# Patient Record
Sex: FEMALE | Race: WHITE | NOT HISPANIC OR LATINO | Employment: OTHER | URBAN - METROPOLITAN AREA
[De-identification: names, ages, dates, MRNs, and addresses within clinical notes are randomized per-mention and may not be internally consistent; named-entity substitution may affect disease eponyms.]

---

## 2017-01-23 DIAGNOSIS — G89.4 CHRONIC PAIN SYNDROME: Primary | ICD-10-CM

## 2017-01-23 RX ORDER — TRAMADOL HYDROCHLORIDE 50 MG/1
TABLET ORAL
Qty: 180 TABLET | Refills: 0 | Status: SHIPPED | OUTPATIENT
Start: 2017-01-23 | End: 2017-02-23

## 2017-01-23 NOTE — TELEPHONE ENCOUNTER
Controlled Substance Refill Request for traMADol (ULTRAM) 50 MG tablet  Problem List Complete:  Yes    Chronic pain syndrome         Problem Detail      Noted:  1/27/2016      Priority:  Medium      Overview Addendum 9/8/2016  8:04 AM by Malia Julio MD     Patient is followed by Data Unavailable for ongoing prescription of pain medication.  All refills should be approved by this provider, or covering partner.    Medication(s): tramadol 50 mg .    Maximum quantity per month: 180  Clinic visit frequency required: Q 3 months     Controlled substance agreement on file: Yes       Date(s): 2010    Pain Clinic evaluation in the past: No    DIRE Total Score(s):  No flowsheet data found.    Last [facility] website verification:  none; 09/08/2016     [email or web address]        checked in past 6 months?  Yes 9/8/2016

## 2017-02-23 DIAGNOSIS — G89.4 CHRONIC PAIN SYNDROME: ICD-10-CM

## 2017-02-23 RX ORDER — TRAMADOL HYDROCHLORIDE 50 MG/1
TABLET ORAL
Qty: 180 TABLET | Refills: 0 | Status: SHIPPED | OUTPATIENT
Start: 2017-02-23 | End: 2017-03-31

## 2017-02-23 NOTE — TELEPHONE ENCOUNTER
Controlled Substance Refill Request for traMADol (ULTRAM) 50 MG tablet  Problem List Complete:  Yes  Problem Detail      Noted:  1/27/2016      Priority:  Medium      Overview Addendum 9/8/2016  8:04 AM by Malia Julio MD     Patient is followed by Data Unavailable for ongoing prescription of pain medication. All refills should be approved by this provider, or covering partner.     Medication(s): tramadol 50 mg .   Maximum quantity per month: 180  Clinic visit frequency required: Q 3 months      Controlled substance agreement on file: Yes  Date(s): 2010     Pain Clinic evaluation in the past: No     DIRE Total Score(s):  No flowsheet data found.     Last [facility] website verification: none; 09/08/2016   [email or web address]        Previous Version        checked in past 6 months?  Yes 09/08/2016  Stephanie Cat MA

## 2017-02-28 ENCOUNTER — TELEPHONE (OUTPATIENT)
Dept: FAMILY MEDICINE | Facility: CLINIC | Age: 72
End: 2017-02-28

## 2017-02-28 NOTE — TELEPHONE ENCOUNTER
amoxicillin (AMOXIL) 500 MG capsule      Last Written Prescription Date: 6/23/2015  Last Fill Quantity: 4 cap,  # refills: 4   Last Office Visit with [facility], [facility] or [facility] prescribing provider: 12/20/2016

## 2017-03-02 NOTE — TELEPHONE ENCOUNTER
Reason for call: Please let patient/pharmacy know if this is being signed/sent.    Phone Number Pt can be reached at: Other phone number:  [phone]  Best Time: anytime  Can we leave a detailed message on this number? YES

## 2017-03-03 RX ORDER — AMOXICILLIN 500 MG/1
CAPSULE ORAL
Qty: 4 CAPSULE | Refills: 0 | OUTPATIENT
Start: 2017-03-03

## 2017-03-03 NOTE — TELEPHONE ENCOUNTER
RN to call pt  Does not need antibiotics for coronary artery disease-- I do not see any evidence of condition that requires antibiotics prior to dental work.  Malia Julio MD

## 2017-03-03 NOTE — TELEPHONE ENCOUNTER
"Spoke with patient and discussed Dr. Julio's message.  Patient stated that she had \"open heart surgery and Dr. Huber has always prescribed abx for dental work.\"  She stated that she does not have a dental appointment until a couple more weeks out but would like message sent to Dr. Cecile drummond address upon her return    Routing to Dr. Cecile Wren RN    "

## 2017-03-03 NOTE — TELEPHONE ENCOUNTER
Routing refill request to provider for review/approval because:  Drug not on the [facility] refill protocol- patient takes prior to dental work   Amy White RN

## 2017-03-06 NOTE — TELEPHONE ENCOUNTER
I called patient and told her she no longer needs amoxicillin prior to dental work. She was relieved.   LEONARDO SANDHU M.D.

## 2017-03-07 RX ORDER — AMOXICILLIN 500 MG/1
CAPSULE ORAL
Qty: 4 CAPSULE | Refills: 0 | Status: CANCELLED | OUTPATIENT
Start: 2017-03-07

## 2017-03-07 NOTE — TELEPHONE ENCOUNTER
Amoxicillin 500mg      Last Written Prescription Date: 06/23/2015  Last Fill Quantity: 4 cap,  # refills: 4   Last Office Visit with [facility], [facility] or [facility] prescribing provider: 12/20/2016                                             Stephanie Cat MA

## 2017-03-09 NOTE — TELEPHONE ENCOUNTER
Patient was called. She now knows that she no longer needs to take amoxicillin prior to dental work.     LEONARDO SANDHU M.D.

## 2017-03-09 NOTE — TELEPHONE ENCOUNTER
Routing refill request to provider for review/approval because:  A break in medication: last refilled back in 2015.    Fer JIM RN, BSN

## 2017-03-28 DIAGNOSIS — G89.4 CHRONIC PAIN SYNDROME: ICD-10-CM

## 2017-03-29 NOTE — TELEPHONE ENCOUNTER
Controlled Substance Refill Request for traMADol (ULTRAM) 50 MG tablet  Problem List Complete:  Yes--    Patient is followed by Data Unavailable for ongoing prescription of pain medication.  All refills should be approved by this provider, or covering partner.    Medication(s): tramadol 50 mg .   Maximum quantity per month: 180  Clinic visit frequency required: Q 3 months     Controlled substance agreement on file: Yes       Date(s): 2010    Pain Clinic evaluation in the past: No    DIRE Total Score(s):  No flowsheet data found.    Last [facility] website verification:  none; 09/08/2016    [email or web address]   checked in past 6 months?  No, route to RUBY Ruffin CMA

## 2017-03-30 RX ORDER — TRAMADOL HYDROCHLORIDE 50 MG/1
TABLET ORAL
Start: 2017-03-30

## 2017-03-30 NOTE — TELEPHONE ENCOUNTER
Refused Prescriptions:                       Disp   Refills    traMADol (ULTRAM) 50 MG tablet [Pharmacy M*                Sig: TAKE 2 TABLETS BY MOUTH EVERY 6 HOURS AS NEEDED FOR           PAIN  Refused By: MADALYN SWAN  Reason for Refusal: Appt required, please call patient

## 2017-03-30 NOTE — TELEPHONE ENCOUNTER
RX monitoring [place] ([place]) reviewed: RNs are unable to access  at this time. Not sure if this is a website issue. Error page comes up when passwords are entered    [place] profile:  [email or web address]  Rios Wren RN

## 2017-03-31 ENCOUNTER — TELEPHONE (OUTPATIENT)
Dept: FAMILY MEDICINE | Facility: CLINIC | Age: 72
End: 2017-03-31

## 2017-03-31 DIAGNOSIS — G89.4 CHRONIC PAIN SYNDROME: Primary | ICD-10-CM

## 2017-03-31 RX ORDER — TRAMADOL HYDROCHLORIDE 50 MG/1
50 TABLET ORAL EVERY 8 HOURS PRN
Qty: 36 TABLET | Refills: 0 | Status: SHIPPED | OUTPATIENT
Start: 2017-03-31 | End: 2017-04-06

## 2017-03-31 RX ORDER — TRAMADOL HYDROCHLORIDE 50 MG/1
TABLET ORAL
Qty: 36 TABLET | Refills: 0 | Status: SHIPPED | OUTPATIENT
Start: 2017-03-31 | End: 2017-04-27 | Stop reason: DRUGHIGH

## 2017-03-31 NOTE — TELEPHONE ENCOUNTER
Patient called RN hotline wondering why this was refused.  Notified patient that she does need a visit with provider every 3 months, patient was not aware of this.  Patient scheduled f/u visit on 4/6/17 (requested PCP only).  Patient has 1 days worth of Tramdol and is wondering if she can get enough to make it through to appointment?  Please adivse- order teed up   Amy White RN
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
## 2017-04-06 ENCOUNTER — OFFICE VISIT (OUTPATIENT)
Dept: FAMILY MEDICINE | Facility: CLINIC | Age: 72
End: 2017-04-06
Payer: COMMERCIAL

## 2017-04-06 VITALS
WEIGHT: 240 LBS | OXYGEN SATURATION: 96 % | SYSTOLIC BLOOD PRESSURE: 132 MMHG | HEIGHT: 69 IN | HEART RATE: 89 BPM | TEMPERATURE: 98.6 F | DIASTOLIC BLOOD PRESSURE: 68 MMHG | BODY MASS INDEX: 35.55 KG/M2

## 2017-04-06 DIAGNOSIS — G89.4 CHRONIC PAIN SYNDROME: ICD-10-CM

## 2017-04-06 DIAGNOSIS — M25.811 SHOULDER IMPINGEMENT, RIGHT: ICD-10-CM

## 2017-04-06 DIAGNOSIS — E11.21 TYPE 2 DIABETES MELLITUS WITH DIABETIC NEPHROPATHY, WITHOUT LONG-TERM CURRENT USE OF INSULIN (H): Primary | ICD-10-CM

## 2017-04-06 DIAGNOSIS — N18.30 CKD (CHRONIC KIDNEY DISEASE) STAGE 3, GFR 30-59 ML/MIN (H): ICD-10-CM

## 2017-04-06 LAB
ANION GAP SERPL CALCULATED.3IONS-SCNC: 9 MMOL/L (ref 3–14)
BUN SERPL-MCNC: 39 MG/DL (ref 7–30)
CALCIUM SERPL-MCNC: 9.8 MG/DL (ref 8.5–10.1)
CHLORIDE SERPL-SCNC: 103 MMOL/L (ref 94–109)
CO2 SERPL-SCNC: 26 MMOL/L (ref 20–32)
CREAT SERPL-MCNC: 1.38 MG/DL (ref 0.52–1.04)
GFR SERPL CREATININE-BSD FRML MDRD: 37 ML/MIN/1.7M2
GLUCOSE SERPL-MCNC: 163 MG/DL (ref 70–99)
HBA1C MFR BLD: 8.2 % (ref 4.3–6)
POTASSIUM SERPL-SCNC: 4.7 MMOL/L (ref 3.4–5.3)
SODIUM SERPL-SCNC: 138 MMOL/L (ref 133–144)

## 2017-04-06 PROCEDURE — 83036 HEMOGLOBIN GLYCOSYLATED A1C: CPT | Performed by: FAMILY MEDICINE

## 2017-04-06 PROCEDURE — 99207 C FOOT EXAM  NO CHARGE: CPT | Performed by: FAMILY MEDICINE

## 2017-04-06 PROCEDURE — 80048 BASIC METABOLIC PNL TOTAL CA: CPT | Performed by: FAMILY MEDICINE

## 2017-04-06 PROCEDURE — 99214 OFFICE O/P EST MOD 30 MIN: CPT | Performed by: FAMILY MEDICINE

## 2017-04-06 PROCEDURE — 36415 COLL VENOUS BLD VENIPUNCTURE: CPT | Performed by: FAMILY MEDICINE

## 2017-04-06 RX ORDER — TRAMADOL HYDROCHLORIDE 50 MG/1
50 TABLET ORAL EVERY 8 HOURS PRN
Qty: 180 TABLET | Refills: 2 | Status: SHIPPED | OUTPATIENT
Start: 2017-04-06 | End: 2017-04-27 | Stop reason: DRUGHIGH

## 2017-04-06 NOTE — LETTER
[address] [place] [address]    April 6, 2017    Anamaria Parra  [address] [place] [address]          Dear Anamaria,    Your lab tests are looking stable. You still have chronic kidney disease stage 3. It is important that you try hard to get your hemoglobin A1C below 8.0 and that you try to use as little tramadol as possible as both diabetes and tramadol are hard on your kidneys. Try to drink plenty of plain water during the day, also. See me again in 3 months.    Enclosed is a copy of your results.     Results for orders placed or performed in visit on 04/06/17   HEMOGLOBIN A1C   Result Value Ref Range    Hemoglobin A1C 8.2 (H) 4.3 - 6.0 %   Basic metabolic panel   Result Value Ref Range    Sodium 138 133 - 144 mmol/L    Potassium 4.7 3.4 - 5.3 mmol/L    Chloride 103 94 - 109 mmol/L    Carbon Dioxide 26 20 - 32 mmol/L    Anion Gap 9 3 - 14 mmol/L    Glucose 163 (H) 70 - 99 mg/dL    Urea Nitrogen 39 (H) 7 - 30 mg/dL    Creatinine 1.38 (H) 0.52 - 1.04 mg/dL    GFR Estimate 37 (L) >60 mL/min/1.7m2    GFR Estimate If Black 45 (L) >60 mL/min/1.7m2    Calcium 9.8 8.5 - 10.1 mg/dL       If you have any questions or concerns, please call myself or my nurse at [phone].      Sincerely,        Cris Huber MD/KI

## 2017-04-06 NOTE — PATIENT INSTRUCTIONS
[facility]    If you have any questions regarding to your visit please contact your care team:       Team Purple:   Clinic Hours Telephone Number   [address]   7am-7pm  Monday - Thursday   7am-5pm  Fridays  [phone]  (Appointment scheduling available 24/7)    Questions about your Visit?   Team Line:  [phone]   Urgent Care - [place] and [facility] - 11am-9pm Monday-Friday Saturday-Sunday- 9am-5pm   [place] - 5pm-9pm Monday-Friday Saturday-Sunday- 9am-5pm  [phone] - [facility]  [phone] - [place]       What options do I have for visits at the clinic other than the traditional office visit?  To expand how we care for you, many of our providers are utilizing electronic visits (e-visits) and telephone visits, when medically appropriate, for interactions with their patients rather than a visit in the clinic.   We also offer nurse visits for many medical concerns. Just like any other service, we will bill your insurance company for this type of visit based on time spent on the phone with your provider. Not all insurance companies cover these visits. Please check with your medical insurance if this type of visit is covered. You will be responsible for any charges that are not paid by your insurance.      E-visits via [employer]:  generally incur a $35.00 fee.  Telephone visits:  Time spent on the phone: *charged based on time that is spent on the phone in increments of 10 minutes. Estimated cost:   5-10 mins $30.00   11-20 mins. $59.00   21-30 mins. $85.00     Use [employer] (secure email communication and access to your chart) to send your primary care provider a message or make an appointment. Ask someone on your Team how to sign up for [employer].  For a Price Quote for your services, please call our Consumer Price Line at [phone].  As always, Thank you for trusting us with your health care needs!

## 2017-04-06 NOTE — MR AVS SNAPSHOT
After Visit Summary   4/6/2017    Anamaria Parra    MRN: [id number]           Patient Information     Date Of Birth          1945        Visit Information        Provider Department      4/6/2017 10:30 AM Cris Huber MD [facility]        Today's Diagnoses     Type 2 diabetes mellitus with diabetic nephropathy, without long-term current use of insulin (H)    -  1    CKD (chronic kidney disease) stage 3, GFR 30-59 ml/min        Shoulder impingement, right        Chronic pain syndrome          Care Instructions    [facility] (formerly [facility])    If you have any questions regarding to your visit please contact your care team:       Team Purple:   Clinic Hours Telephone Number   [address] Dr[address]   7am-7pm  Monday - Thursday   7am-5pm  Fridays  [phone]  (Appointment scheduling available 24/7)    Questions about your Visit?   Team Line:  [phone]   Urgent Care - [place] - 11am-9pm Monday-Friday Saturday-Sunday- 9am-5pm   [place] - 5pm-9pm Monday-Friday Saturday-Sunday- 9am-5pm  [phone] - [place]   [phone] - [place]       What options do I have for visits at the clinic other than the traditional office visit?  To expand how we care for you, many of our providers are utilizing electronic visits (e-visits) and telephone visits, when medically appropriate, for interactions with their patients rather than a visit in the clinic.   We also offer nurse visits for many medical concerns. Just like any other service, we will bill your insurance company for this type of visit based on time spent on the phone with your provider. Not all insurance companies cover these visits. Please check with your medical insurance if this type of visit is covered. You will be responsible for any charges that are not paid by your insurance.      E-visits via Peer60:  generally incur a $35.00  fee.  Telephone visits:  Time spent on the phone: *charged based on time that is spent on the phone in increments of 10 minutes. Estimated cost:   5-10 mins $30.00   11-20 mins. $59.00   21-30 mins. $85.00     Use KonaWaret (secure email communication and access to your chart) to send your primary care provider a message or make an appointment. Ask someone on your Team how to sign up for DokDok.  For a Price Quote for your services, please call our [employer] Line at [phone].  As always, Thank you for trusting us with your health care needs!            Follow-ups after your visit        Your next 10 appointments already scheduled     Jul 06, 2017 10:00 AM CDT   Office Visit with Cris Huber MD   [facility] ([address] [id number] [phone]           Bring a current list of meds and any records pertaining to this visit.  For Physicals, please bring immunization records and any forms needing to be filled out.  Please arrive 10 minutes early to complete paperwork.              Who to contact     If you have questions or need follow up information about today's clinic visit or your schedule please contact [facility] directly at [phone].  Normal or non-critical lab and imaging results will be communicated to you by Planspothart, letter or phone within 4 business days after the clinic has received the results. If you do not hear from us within 7 days, please contact the clinic through Planspothart or phone. If you have a critical or abnormal lab result, we will notify you by phone as soon as possible.  Submit refill requests through DokDok or call your pharmacy and they will forward the refill request to us. Please allow 3 business days for your refill to be completed.          Additional Information About Your Visit        DokDok Information     DokDok lets you send messages to your doctor, view your test results, renew your  "prescriptions, schedule appointments and more. To sign up, go to [email or web address] . Click on \"Log in\" on the left side of the screen, which will take you to the Welcome page. Then click on \"Sign up Now\" on the right side of the page.     You will be asked to enter the access code listed below, as well as some personal information. Please follow the directions to create your username and password.     Your access code is: [email or web address]  Expires: 2017 10:47 AM     Your access code will  in 90 days. If you need help or a new code, please call your [place] clinic or [phone].        Care EveryWhere ID     This is your Care EveryWhere ID. This could be used by other organizations to access your [place] medical records  [id number]        Your Vitals Were     Pulse Temperature Height Pulse Oximetry BMI (Body Mass Index)       89 98.6  F (37  C) 5' 9\" (1.753 m) 96% 35.44 kg/m2        Blood Pressure from Last 3 Encounters:   17 132/68   16 128/74   16 112/60    Weight from Last 3 Encounters:   17 240 lb (108.9 kg)   16 235 lb (106.6 kg)   16 241 lb (109.3 kg)              We Performed the Following     Basic metabolic panel     C FOOT EXAM  NO CHARGE     HEMOGLOBIN A1C          Where to get your medicines      Some of these will need a paper prescription and others can be bought over the counter.  Ask your nurse if you have questions.     Bring a paper prescription for each of these medications     traMADol 50 MG tablet          Primary Care Provider Office Phone # Fax #    Cris Huber [phone]       [address] [address]        Thank you!     Thank you for choosing [facility]  for your care. Our goal is always to provide you with excellent care. Hearing back from our patients is one way we can continue to improve our services. Please take a few minutes to complete the " written survey that you may receive in the mail after your visit with us. Thank you!             Your Updated Medication List - Protect others around you: Learn how to safely use, store and throw away your medicines at www.disposemymeds.org.          This list is accurate as of: 4/6/17 10:47 AM.  Always use your most recent med list.                   Brand Name Dispense Instructions for use    aspirin 325 MG EC tablet     100 Tab    take 1 Tab by mouth daily.       B-12 PO      1000 MCG DAILY       fish oil-omega-3 fatty acids 1000 MG capsule      2 TABLETS DAILY       FOLIC ACID PO      3000 MCG DAILY       furosemide 40 MG tablet    LASIX    90 tablet    Take 1 tablet (40 mg) by mouth daily       glipiZIDE 2.5 MG 24 hr tablet    GLUCOTROL XL    90 tablet    Take 1 tablet (2.5 mg) by mouth daily (with breakfast)       lisinopril 20 MG tablet    PRINIVIL/ZESTRIL    90 tablet    Take 1 tablet (20 mg) by mouth daily       metFORMIN 500 MG tablet    GLUCOPHAGE    360 tablet    Take 2 tablets (1,000 mg) by mouth 2 times daily (with meals)       metoprolol 50 MG 24 hr tablet    TOPROL-XL    135 tablet    Take 1.5 tablets (75 mg) by mouth daily       Multi-vitamin Tabs tablet   Generic drug:  multivitamin, therapeutic with minerals      1 TABLET DAILY       nitroglycerin 0.4 MG sublingual tablet    NITROSTAT    60 tablet    Place 1 tablet (0.4 mg) under the tongue every 5 minutes as needed up to 3 tablets per episode.       POMEGRANATE EXTRACT PO      Take by mouth daily Reported on 4/6/2017       rosuvastatin 40 MG tablet    CRESTOR    90 tablet    Take 1 tablet (40 mg) by mouth daily Replaces Lipitor because her insurance approved Crestor for another year       STOOL SOFTENER PO      None Entered       * traMADol 50 MG tablet    ULTRAM    36 tablet    TAKE 2 TABLETS BY MOUTH EVERY 6 HOURS AS NEEDED FOR MODERATE PAIN       * traMADol 50 MG tablet    ULTRAM    180 tablet    Take 1 tablet (50 mg) by mouth every 8 hours as  needed for moderate pain (Max)       vitamin D 1000 UNITS capsule      1 CAPSULE DAILY       * Notice:  This list has 2 medication(s) that are the same as other medications prescribed for you. Read the directions carefully, and ask your doctor or other care provider to review them with you.

## 2017-04-06 NOTE — NURSING NOTE
"Chief Complaint   Patient presents with     Recheck Medication       Initial /68 (BP Location: Left arm, Patient Position: Chair, Cuff Size: Adult Large)  Pulse 89  Temp 98.6  F (37  C)  Ht 5' 9\" (1.753 m)  Wt 240 lb (108.9 kg)  SpO2 96%  BMI 35.44 kg/m2 Estimated body mass index is 35.44 kg/(m^2) as calculated from the following:    Height as of this encounter: 5' 9\" (1.753 m).    Weight as of this encounter: 240 lb (108.9 kg).  Medication Reconciliation: complete   Silvia Adam MA      "
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
## 2017-04-27 ENCOUNTER — OFFICE VISIT (OUTPATIENT)
Dept: FAMILY MEDICINE | Facility: CLINIC | Age: 72
End: 2017-04-27
Payer: COMMERCIAL

## 2017-04-27 VITALS
OXYGEN SATURATION: 98 % | DIASTOLIC BLOOD PRESSURE: 68 MMHG | SYSTOLIC BLOOD PRESSURE: 132 MMHG | TEMPERATURE: 98 F | BODY MASS INDEX: 35.7 KG/M2 | HEART RATE: 84 BPM | WEIGHT: 241 LBS | HEIGHT: 69 IN

## 2017-04-27 DIAGNOSIS — L23.9 ALLERGIC CONTACT DERMATITIS, UNSPECIFIED TRIGGER: Primary | ICD-10-CM

## 2017-04-27 PROCEDURE — 99213 OFFICE O/P EST LOW 20 MIN: CPT | Performed by: FAMILY MEDICINE

## 2017-04-27 RX ORDER — MOMETASONE FUROATE 1 MG/G
CREAM TOPICAL
Qty: 45 G | Refills: 1 | Status: SHIPPED | OUTPATIENT
Start: 2017-04-27 | End: 2019-07-02

## 2017-04-27 NOTE — PATIENT INSTRUCTIONS
[facility]    If you have any questions regarding to your visit please contact your care team:       Team Purple:   Clinic Hours Telephone Number   [address] Dr.   7am-7pm  Monday - Thursday   7am-5pm  Fridays  [phone]  (Appointment scheduling available 24/7)    Questions about your Visit?   Team Line:  [phone]   Urgent Care - [place] and [facility] - 11am-9pm Monday-Friday Saturday-Sunday- 9am-5pm   [place] - 5pm-9pm Monday-Friday Saturday-Sunday- 9am-5pm  [phone] - [facility]  [phone] - [place]       What options do I have for visits at the clinic other than the traditional office visit?  To expand how we care for you, many of our providers are utilizing electronic visits (e-visits) and telephone visits, when medically appropriate, for interactions with their patients rather than a visit in the clinic.   We also offer nurse visits for many medical concerns. Just like any other service, we will bill your insurance company for this type of visit based on time spent on the phone with your provider. Not all insurance companies cover these visits. Please check with your medical insurance if this type of visit is covered. You will be responsible for any charges that are not paid by your insurance.      E-visits via 5minutes:  generally incur a $35.00 fee.  Telephone visits:  Time spent on the phone: *charged based on time that is spent on the phone in increments of 10 minutes. Estimated cost:   5-10 mins $30.00   11-20 mins. $59.00   21-30 mins. $85.00     Use Revel Toucht (secure email communication and access to your chart) to send your primary care provider a message or make an appointment. Ask someone on your Team how to sign up for 5minutes.  For a Price Quote for your services, please call our Consumer Price Line at [phone].  As always, Thank you for trusting us with your health care needs!

## 2017-04-27 NOTE — MR AVS SNAPSHOT
After Visit Summary   4/27/2017    Anamaria Parra    MRN: [id number]           Patient Information     Date Of Birth          1945        Visit Information        Provider Department      4/27/2017 10:30 AM Cris Huber MD [facility]        Today's Diagnoses     Allergic contact dermatitis, unspecified trigger    -  1      Care Instructions    [facility]    If you have any questions regarding to your visit please contact your care team:       Team Purple:   Clinic Hours Telephone Number   [address] Dr.   7am-7pm  Monday - Thursday   7am-5pm  Fridays  [phone]  (Appointment scheduling available 24/7)    Questions about your Visit?   Team Line:  [phone]   [facility] - [place] [facility] - 11am-9pm Monday-Friday Saturday-Sunday- 9am-5pm   [place] - 5pm-9pm Monday-Friday Saturday-Sunday- 9am-5pm  [phone] - [facility]  [phone] - [place]       What options do I have for visits at the clinic other than the traditional office visit?  To expand how we care for you, many of our providers are utilizing electronic visits (e-visits) and telephone visits, when medically appropriate, for interactions with their patients rather than a visit in the clinic.   We also offer nurse visits for many medical concerns. Just like any other service, we will bill your insurance company for this type of visit based on time spent on the phone with your provider. Not all insurance companies cover these visits. Please check with your medical insurance if this type of visit is covered. You will be responsible for any charges that are not paid by your insurance.      E-visits via 28msec:  generally incur a $35.00 fee.  Telephone visits:  Time spent on the phone: *charged based on time that is spent on the phone in increments of 10 minutes. Estimated cost:   5-10 mins $30.00   11-20 mins.  "$59.00   21-30 mins. $85.00     Use Pixtronixhart (secure email communication and access to your chart) to send your primary care provider a message or make an appointment. Ask someone on your Team how to sign up for [employer].  For a Price Quote for your services, please call our Consumer Price Line at [phone].  As always, Thank you for trusting us with your health care needs!            Follow-ups after your visit        Your next 10 appointments already scheduled     Jul 06, 2017 10:00 AM CDT   Office Visit with Cris Huber MD   [facility] ([facility])    [address] [place] [id number] [phone]           Bring a current list of meds and any records pertaining to this visit.  For Physicals, please bring immunization records and any forms needing to be filled out.  Please arrive 10 minutes early to complete paperwork.              Who to contact     If you have questions or need follow up information about today's clinic visit or your schedule please contact [facility] directly at [phone].  Normal or non-critical lab and imaging results will be communicated to you by Pixtronixhart, letter or phone within 4 business days after the clinic has received the results. If you do not hear from us within 7 days, please contact the clinic through Fanaticst or phone. If you have a critical or abnormal lab result, we will notify you by phone as soon as possible.  Submit refill requests through [employer] or call your pharmacy and they will forward the refill request to us. Please allow 3 business days for your refill to be completed.          Additional Information About Your Visit        [employer] Products Information     [employer] lets you send messages to your doctor, view your test results, renew your prescriptions, schedule appointments and more. To sign up, go to [email or web address] . Click on \"Log in\" on the left side of the screen, which will take you to the Welcome " "page. Then click on \"Sign up Now\" on the right side of the page.     You will be asked to enter the access code listed below, as well as some personal information. Please follow the directions to create your username and password.     Your access code is: [email or web address]  Expires: 2017 10:47 AM     Your access code will  in 90 days. If you need help or a new code, please call your [place] clinic or [phone].        Care EveryWhere ID     This is your Care EveryWhere ID. This could be used by other organizations to access your [place] medical records  [id number]        Your Vitals Were     Pulse Temperature Height Pulse Oximetry BMI (Body Mass Index)       84 98  F (36.7  C) 5' 9\" (1.753 m) 98% 35.59 kg/m2        Blood Pressure from Last 3 Encounters:   17 132/68   17 132/68   16 128/74    Weight from Last 3 Encounters:   17 241 lb (109.3 kg)   17 240 lb (108.9 kg)   16 235 lb (106.6 kg)              Today, you had the following     No orders found for display         Today's Medication Changes          These changes are accurate as of: 17 10:49 AM.  If you have any questions, ask your nurse or doctor.               Start taking these medicines.        Dose/Directions    mometasone 0.1 % cream   Commonly known as:  ELOCON   Used for:  Allergic contact dermatitis, unspecified trigger   Started by:  Cris Huber MD        Apply sparingly to affected area twice daily as needed.  Do not apply to face.   Quantity:  45 g   Refills:  1         Stop taking these medicines if you haven't already. Please contact your care team if you have questions.     traMADol 50 MG tablet   Commonly known as:  ULTRAM   Stopped by:  Cris Huber MD                Where to get your medicines      [email or web address]  [place], [place] [facility] [address] [facility] [address] [address]  [address], [place] [address]     " Phone:  [phone]     mometasone 0.1 % cream                Primary Care Provider Office Phone # Fax #    Cris Huber [phone]       [address]  [facility] [address]        Thank you!     Thank you for choosing [facility]  for your care. Our goal is always to provide you with excellent care. Hearing back from our patients is one way we can continue to improve our services. Please take a few minutes to complete the written survey that you may receive in the mail after your visit with us. Thank you!             Your Updated Medication List - Protect others around you: Learn how to safely use, store and throw away your medicines at www.disposemymeds.org.          This list is accurate as of: 4/27/17 10:49 AM.  Always use your most recent med list.                   Brand Name Dispense Instructions for use    aspirin 325 MG EC tablet     100 Tab    take 1 Tab by mouth daily.       B-12 PO      1000 MCG DAILY       fish oil-omega-3 fatty acids 1000 MG capsule      2 TABLETS DAILY       FOLIC ACID PO      3000 MCG DAILY       furosemide 40 MG tablet    LASIX    90 tablet    Take 1 tablet (40 mg) by mouth daily       glipiZIDE 2.5 MG 24 hr tablet    GLUCOTROL XL    90 tablet    Take 1 tablet (2.5 mg) by mouth daily (with breakfast)       lisinopril 20 MG tablet    PRINIVIL/ZESTRIL    90 tablet    Take 1 tablet (20 mg) by mouth daily       metFORMIN 500 MG tablet    GLUCOPHAGE    360 tablet    Take 2 tablets (1,000 mg) by mouth 2 times daily (with meals)       metoprolol 50 MG 24 hr tablet    TOPROL-XL    135 tablet    Take 1.5 tablets (75 mg) by mouth daily       mometasone 0.1 % cream    ELOCON    45 g    Apply sparingly to affected area twice daily as needed.  Do not apply to face.       Multi-vitamin Tabs tablet   Generic drug:  multivitamin, therapeutic with minerals      1 TABLET DAILY       nitroglycerin 0.4 MG sublingual tablet     NITROSTAT    60 tablet    Place 1 tablet (0.4 mg) under the tongue every 5 minutes as needed up to 3 tablets per episode.       POMEGRANATE EXTRACT PO      Take by mouth daily Reported on 4/6/2017       rosuvastatin 40 MG tablet    CRESTOR    90 tablet    Take 1 tablet (40 mg) by mouth daily Replaces Lipitor because her insurance approved Crestor for another year       STOOL SOFTENER PO      None Entered       vitamin D 1000 UNITS capsule      1 CAPSULE DAILY

## 2017-04-27 NOTE — NURSING NOTE
"Chief Complaint   Patient presents with     Ear Drainage     Recurring Drainged Behind (L) Ear     Health Maintenance     Colon cancer screening due       Initial /68 (BP Location: Right arm, Patient Position: Chair, Cuff Size: Adult Large)  Pulse 84  Temp 98  F (36.7  C)  Ht 5' 9\" (1.753 m)  Wt 241 lb (109.3 kg)  SpO2 98%  BMI 35.59 kg/m2 Estimated body mass index is 35.59 kg/(m^2) as calculated from the following:    Height as of this encounter: 5' 9\" (1.753 m).    Weight as of this encounter: 241 lb (109.3 kg).  Medication Reconciliation: complete   Silvia Adam MA      "

## 2017-05-08 ENCOUNTER — TELEPHONE (OUTPATIENT)
Dept: FAMILY MEDICINE | Facility: CLINIC | Age: 72
End: 2017-05-08

## 2017-05-08 DIAGNOSIS — G89.4 CHRONIC PAIN SYNDROME: Primary | ICD-10-CM

## 2017-05-08 DIAGNOSIS — M19.019 AC (ACROMIOCLAVICULAR) JOINT ARTHRITIS: ICD-10-CM

## 2017-05-08 RX ORDER — TRAMADOL HYDROCHLORIDE 50 MG/1
TABLET ORAL
Qty: 180 TABLET | Refills: 2 | Status: SHIPPED | OUTPATIENT
Start: 2017-05-08 | End: 2017-08-03

## 2017-05-08 NOTE — TELEPHONE ENCOUNTER
Controlled Substance Refill Request for traMADol (ULTRAM) 50 MG tablet   Problem List Complete:  Yes  Chronic pain syndrome         Problem Detail      Noted:  1/27/2016      Priority:  Medium      Overview Addendum 3/31/2017  1:48 PM by Sedrick Wang MD     Patient is followed by Data Unavailable for ongoing prescription of pain medication. All refills should be approved by this provider, or covering partner.     Medication(s): tramadol 50 mg .   Maximum quantity per month: 180  Clinic visit frequency required: Q 3 months      Controlled substance agreement on file: Yes  Date(s): 2010     Pain Clinic evaluation in the past: No     DIRE Total Score(s):  No flowsheet data found.     Last [facility] website verification: none; 09/08/2016   [email or web address]        Previous Version            checked in past 6 months?  Yes 03/29/2016  Stephanie Cat MA
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
## 2017-08-03 ENCOUNTER — OFFICE VISIT (OUTPATIENT)
Dept: FAMILY MEDICINE | Facility: CLINIC | Age: 72
End: 2017-08-03
Payer: COMMERCIAL

## 2017-08-03 VITALS
WEIGHT: 238.2 LBS | SYSTOLIC BLOOD PRESSURE: 128 MMHG | HEART RATE: 78 BPM | OXYGEN SATURATION: 97 % | TEMPERATURE: 98.2 F | BODY MASS INDEX: 35.28 KG/M2 | DIASTOLIC BLOOD PRESSURE: 68 MMHG | HEIGHT: 69 IN

## 2017-08-03 DIAGNOSIS — L30.9 ECZEMA, UNSPECIFIED TYPE: ICD-10-CM

## 2017-08-03 DIAGNOSIS — M19.019 AC (ACROMIOCLAVICULAR) JOINT ARTHRITIS: ICD-10-CM

## 2017-08-03 DIAGNOSIS — G89.4 CHRONIC PAIN SYNDROME: ICD-10-CM

## 2017-08-03 DIAGNOSIS — I10 BENIGN ESSENTIAL HYPERTENSION: ICD-10-CM

## 2017-08-03 DIAGNOSIS — N18.30 CKD (CHRONIC KIDNEY DISEASE) STAGE 3, GFR 30-59 ML/MIN (H): ICD-10-CM

## 2017-08-03 DIAGNOSIS — E11.21 TYPE 2 DIABETES MELLITUS WITH DIABETIC NEPHROPATHY, WITHOUT LONG-TERM CURRENT USE OF INSULIN (H): Primary | ICD-10-CM

## 2017-08-03 DIAGNOSIS — E78.5 HYPERLIPIDEMIA LDL GOAL <100: ICD-10-CM

## 2017-08-03 DIAGNOSIS — I25.810 CORONARY ARTERY DISEASE INVOLVING AUTOLOGOUS ARTERY CORONARY BYPASS GRAFT WITHOUT ANGINA PECTORIS: ICD-10-CM

## 2017-08-03 LAB
ALBUMIN SERPL-MCNC: 3.9 G/DL (ref 3.4–5)
ALP SERPL-CCNC: 68 U/L (ref 40–150)
ALT SERPL W P-5'-P-CCNC: 32 U/L (ref 0–50)
ANION GAP SERPL CALCULATED.3IONS-SCNC: 11 MMOL/L (ref 3–14)
AST SERPL W P-5'-P-CCNC: 16 U/L (ref 0–45)
BASOPHILS # BLD AUTO: 0.1 10E9/L (ref 0–0.2)
BASOPHILS NFR BLD AUTO: 0.7 %
BILIRUB SERPL-MCNC: 0.6 MG/DL (ref 0.2–1.3)
BUN SERPL-MCNC: 25 MG/DL (ref 7–30)
CALCIUM SERPL-MCNC: 9.7 MG/DL (ref 8.5–10.1)
CHLORIDE SERPL-SCNC: 102 MMOL/L (ref 94–109)
CHOLEST SERPL-MCNC: 121 MG/DL
CO2 SERPL-SCNC: 28 MMOL/L (ref 20–32)
CREAT SERPL-MCNC: 1.39 MG/DL (ref 0.52–1.04)
DIFFERENTIAL METHOD BLD: NORMAL
EOSINOPHIL # BLD AUTO: 0.3 10E9/L (ref 0–0.7)
EOSINOPHIL NFR BLD AUTO: 2.8 %
ERYTHROCYTE [DISTWIDTH] IN BLOOD BY AUTOMATED COUNT: 13.5 % (ref 10–15)
GFR SERPL CREATININE-BSD FRML MDRD: 37 ML/MIN/1.7M2
GLUCOSE SERPL-MCNC: 160 MG/DL (ref 70–99)
HBA1C MFR BLD: 7.9 % (ref 4.3–6)
HCT VFR BLD AUTO: 43.6 % (ref 35–47)
HDLC SERPL-MCNC: 46 MG/DL
HGB BLD-MCNC: 14.1 G/DL (ref 11.7–15.7)
LDLC SERPL CALC-MCNC: 43 MG/DL
LYMPHOCYTES # BLD AUTO: 1.9 10E9/L (ref 0.8–5.3)
LYMPHOCYTES NFR BLD AUTO: 20.3 %
MCH RBC QN AUTO: 30.5 PG (ref 26.5–33)
MCHC RBC AUTO-ENTMCNC: 32.3 G/DL (ref 31.5–36.5)
MCV RBC AUTO: 94 FL (ref 78–100)
MONOCYTES # BLD AUTO: 0.9 10E9/L (ref 0–1.3)
MONOCYTES NFR BLD AUTO: 9.2 %
NEUTROPHILS # BLD AUTO: 6.3 10E9/L (ref 1.6–8.3)
NEUTROPHILS NFR BLD AUTO: 67 %
NONHDLC SERPL-MCNC: 75 MG/DL
PLATELET # BLD AUTO: 221 10E9/L (ref 150–450)
POTASSIUM SERPL-SCNC: 4.5 MMOL/L (ref 3.4–5.3)
PROT SERPL-MCNC: 7.4 G/DL (ref 6.8–8.8)
RBC # BLD AUTO: 4.63 10E12/L (ref 3.8–5.2)
SODIUM SERPL-SCNC: 141 MMOL/L (ref 133–144)
TRIGL SERPL-MCNC: 159 MG/DL
TSH SERPL DL<=0.005 MIU/L-ACNC: 0.62 MU/L (ref 0.4–4)
WBC # BLD AUTO: 9.5 10E9/L (ref 4–11)

## 2017-08-03 PROCEDURE — 36415 COLL VENOUS BLD VENIPUNCTURE: CPT | Performed by: FAMILY MEDICINE

## 2017-08-03 PROCEDURE — 99214 OFFICE O/P EST MOD 30 MIN: CPT | Performed by: FAMILY MEDICINE

## 2017-08-03 PROCEDURE — 80061 LIPID PANEL: CPT | Performed by: FAMILY MEDICINE

## 2017-08-03 PROCEDURE — 80050 GENERAL HEALTH PANEL: CPT | Performed by: FAMILY MEDICINE

## 2017-08-03 PROCEDURE — 83036 HEMOGLOBIN GLYCOSYLATED A1C: CPT | Performed by: FAMILY MEDICINE

## 2017-08-03 PROCEDURE — 99207 C PAF COMPLETED  NO CHARGE: CPT | Performed by: FAMILY MEDICINE

## 2017-08-03 RX ORDER — NITROGLYCERIN 0.4 MG/1
0.4 TABLET SUBLINGUAL EVERY 5 MIN PRN
Qty: 60 TABLET | Refills: 12 | Status: SHIPPED | OUTPATIENT
Start: 2017-08-03 | End: 2018-05-14

## 2017-08-03 RX ORDER — METOPROLOL SUCCINATE 50 MG/1
75 TABLET, EXTENDED RELEASE ORAL DAILY
Qty: 135 TABLET | Refills: 5 | Status: SHIPPED | OUTPATIENT
Start: 2017-08-03 | End: 2018-05-14

## 2017-08-03 RX ORDER — ROSUVASTATIN CALCIUM 40 MG/1
40 TABLET, COATED ORAL DAILY
Qty: 90 TABLET | Refills: 3 | Status: SHIPPED | OUTPATIENT
Start: 2017-08-03 | End: 2018-05-14

## 2017-08-03 RX ORDER — LISINOPRIL 20 MG/1
20 TABLET ORAL DAILY
Qty: 90 TABLET | Refills: 4 | Status: SHIPPED | OUTPATIENT
Start: 2017-08-03 | End: 2018-05-14

## 2017-08-03 RX ORDER — TRAMADOL HYDROCHLORIDE 50 MG/1
TABLET ORAL
Qty: 180 TABLET | Refills: 2 | Status: SHIPPED | OUTPATIENT
Start: 2017-08-03 | End: 2017-11-03

## 2017-08-03 RX ORDER — FUROSEMIDE 40 MG
40 TABLET ORAL DAILY
Qty: 90 TABLET | Refills: 4 | Status: SHIPPED | OUTPATIENT
Start: 2017-08-03 | End: 2018-05-14

## 2017-08-03 RX ORDER — GLIPIZIDE 2.5 MG/1
2.5 TABLET, EXTENDED RELEASE ORAL
Qty: 90 TABLET | Refills: 4 | Status: SHIPPED | OUTPATIENT
Start: 2017-08-03 | End: 2018-05-14

## 2017-08-03 NOTE — MR AVS SNAPSHOT
After Visit Summary   8/3/2017    Anamaria Parra    MRN: [id number]           Patient Information     Date Of Birth          1945        Visit Information        Provider Department      8/3/2017 10:30 AM Cris Huber MD [facility]        Today's Diagnoses     Type 2 diabetes mellitus with diabetic nephropathy, without long-term current use of insulin (H)    -  1    CKD (chronic kidney disease) stage 3, GFR 30-59 ml/min        Coronary artery disease involving autologous artery coronary bypass graft without angina pectoris        Benign essential hypertension        Chronic pain syndrome        Eczema, unspecified type        AC (acromioclavicular) joint arthritis - right        Hyperlipidemia LDL goal <100          Care Instructions    [facility]    If you have any questions regarding to your visit please contact your care team:       Team Purple:   Clinic Hours Telephone Number   Dr. Cris Julio     7am-7pm  Monday - Thursday   7am-5pm  Fridays  [phone]  (Appointment scheduling available 24/7)    Questions about your Visit?   Team Line:  [phone]   Urgent [facility] - [place] [facility] - 11am-9pm Monday-Friday Saturday-Sunday- 9am-5pm   [place] - 5pm-9pm Monday-Friday Saturday-Sunday- 9am-5pm  [phone] - [place]   [phone] - [place]       What options do I have for visits at the clinic other than the traditional office visit?  To expand how we care for you, many of our providers are utilizing electronic visits (e-visits) and telephone visits, when medically appropriate, for interactions with their patients rather than a visit in the clinic.   We also offer nurse visits for many medical concerns. Just like any other service, we will bill your insurance company for this type of visit based on time spent on the phone with your provider. Not all insurance companies cover these  visits. Please check with your medical insurance if this type of visit is covered. You will be responsible for any charges that are not paid by your insurance.      E-visits via 1EQhart:  generally incur a $35.00 fee.  Telephone visits:  Time spent on the phone: *charged based on time that is spent on the phone in increments of 10 minutes. Estimated cost:   5-10 mins $30.00   11-20 mins. $59.00   21-30 mins. $85.00     Use Blacksumac (secure email communication and access to your chart) to send your primary care provider a message or make an appointment. Ask someone on your Team how to sign up for Black[employer]ac.  For a Price Quote for your services, please call our [employer] Line at [phone].  As always, Thank you for trusting us with your health care needs!              Follow-ups after your visit        Your next 10 appointments already scheduled     Nov 03, 2017  9:30 AM CDT   Office Visit with Cris Huber MD   [facility] ([address] [phone]           Bring a current list of meds and any records pertaining to this visit. For Physicals, please bring immunization records and any forms needing to be filled out. Please arrive 10 minutes early to complete paperwork.              Who to contact     If you have questions or need follow up information about today's clinic visit or your schedule please contact [facility] directly at [phone].  Normal or non-critical lab and imaging results will be communicated to you by 1EQhart, letter or phone within 4 business days after the clinic has received the results. If you do not hear from us within 7 days, please contact the clinic through 1EQhart or phone. If you have a critical or abnormal lab result, we will notify you by phone as soon as possible.  Submit refill requests through Blacksumac or call your pharmacy and they will forward the refill request to us. Please  "allow 3 business days for your refill to be completed.          Additional Information About Your Visit        MyChart Information     Datacratichart lets you send messages to your doctor, view your test results, renew your prescriptions, schedule appointments and more. To sign up, go to [email or web address] . Click on \"Log in\" on the left side of the screen, which will take you to the Welcome page. Then click on \"Sign up Now\" on the right side of the page.     You will be asked to enter the access code listed below, as well as some personal information. Please follow the directions to create your username and password.     Your access code is: [email or web address]  Expires: 2017 10:41 AM     Your access code will  in 90 days. If you need help or a new code, please call your [place] clinic or [phone].        Care EveryWhere ID     This is your Care EveryWhere ID. This could be used by other organizations to access your [place] medical records  [id number]        Your Vitals Were     Pulse Temperature Height Pulse Oximetry BMI (Body Mass Index)       78 98.2  F (36.8  C) 5' 9\" (1.753 m) 97% 35.18 kg/m2        Blood Pressure from Last 3 Encounters:   17 128/68   17 132/68   17 132/68    Weight from Last 3 Encounters:   17 238 lb 3.2 oz (108 kg)   17 241 lb (109.3 kg)   17 240 lb (108.9 kg)              We Performed the Following     CBC with platelets differential     Comprehensive metabolic panel     HEMOGLOBIN A1C     Lipid panel reflex to direct LDL     PAF COMPLETED     TSH with free T4 reflex          Where to get your medicines      These medications were sent to [id number]  [place] [address] [facility] [address] [address]     Phone:  [phone]     furosemide 40 MG tablet    glipiZIDE 2.5 MG 24 hr tablet    lisinopril 20 MG tablet    metFORMIN 500 MG tablet    metoprolol 50 MG 24 hr tablet "    nitroGLYcerin 0.4 MG sublingual tablet    rosuvastatin 40 MG tablet         Some of these will need a paper prescription and others can be bought over the counter.  Ask your nurse if you have questions.     Bring a paper prescription for each of these medications     traMADol 50 MG tablet          Primary Care Provider Office Phone # Fax #    Cris Huber [phone]       [address] [address]        Equal Access to Services     EMMANUELLE WALKER : Hadii aad ku hadasho Soomaali, waaxda luqadaha, qaybta kaalmada adeegyada, waxay idiin hayaan adeeg kharayue laulices . So [facility] [phone].    ATENCIÓN: Si habla español, tiene a keen disposición servicios gratuitos de asistencia lingüística. Llame al [phone].    We comply with applicable federal civil rights laws and [place] laws. We do not discriminate on the basis of race, color, national origin, age, disability sex, sexual orientation or gender identity.            Thank you!     Thank you for choosing [facility]  for your care. Our goal is always to provide you with excellent care. Hearing back from our patients is one way we can continue to improve our services. Please take a few minutes to complete the written survey that you may receive in the mail after your visit with us. Thank you!             Your Updated Medication List - Protect others around you: Learn how to safely use, store and throw away your medicines at www.disposemymeds.org.          This list is accurate as of: 8/3/17 10:41 AM.  Always use your most recent med list.                   Brand Name Dispense Instructions for use Diagnosis    aspirin 325 MG EC tablet     100 Tab    take 1 Tab by mouth daily.    Diabetes mellitus, type 2 (H)       B-12 PO      1000 MCG DAILY        fish oil-omega-3 fatty acids 1000 MG capsule      2 TABLETS DAILY        FOLIC ACID PO      3000 MCG DAILY        furosemide 40 MG  tablet    LASIX    90 tablet    Take 1 tablet (40 mg) by mouth daily    CKD (chronic kidney disease) stage 3, GFR 30-59 ml/min       glipiZIDE 2.5 MG 24 hr tablet    GLUCOTROL XL    90 tablet    Take 1 tablet (2.5 mg) by mouth daily (with breakfast)    Type 2 diabetes mellitus with diabetic nephropathy, without long-term current use of insulin (H)       lisinopril 20 MG tablet    PRINIVIL/ZESTRIL    90 tablet    Take 1 tablet (20 mg) by mouth daily    Benign essential hypertension       metFORMIN 500 MG tablet    GLUCOPHAGE    360 tablet    Take 2 tablets (1,000 mg) by mouth 2 times daily (with meals)    Type 2 diabetes mellitus with diabetic nephropathy, without long-term current use of insulin (H)       metoprolol 50 MG 24 hr tablet    TOPROL-XL    135 tablet    Take 1.5 tablets (75 mg) by mouth daily    Benign essential hypertension       mometasone 0.1 % cream    ELOCON    45 g    Apply sparingly to affected area twice daily as needed.  Do not apply to face.    Allergic contact dermatitis, unspecified trigger       Multi-vitamin Tabs tablet   Generic drug:  multivitamin, therapeutic with minerals      1 TABLET DAILY        nitroGLYcerin 0.4 MG sublingual tablet    NITROSTAT    60 tablet    Place 1 tablet (0.4 mg) under the tongue every 5 minutes as needed up to 3 tablets per episode.    Coronary artery disease involving autologous artery coronary bypass graft without angina pectoris       POMEGRANATE EXTRACT PO      Take by mouth daily Reported on 4/6/2017        rosuvastatin 40 MG tablet    CRESTOR    90 tablet    Take 1 tablet (40 mg) by mouth daily Replaces Lipitor because her insurance approved Crestor for another year    Hyperlipidemia LDL goal <100       STOOL SOFTENER PO      None Entered        traMADol 50 MG tablet    ULTRAM    180 tablet    Take 2 every 8 hours, maximum 6 tablet(s) per day    Chronic pain syndrome, AC (acromioclavicular) joint arthritis       vitamin D 1000 UNITS capsule      1 CAPSULE  DAILY

## 2017-08-03 NOTE — LETTER
[address] [place] [address]    August 4, 2017    Anamaria Parra  [address] [place] [address]          Dear Anamaria,    All of your lab tests are looking stable and good for you. Continue your healthy lifestyle. See me again in November    Enclosed is a copy of your results.     Results for orders placed or performed in visit on 08/03/17   HEMOGLOBIN A1C   Result Value Ref Range    Hemoglobin A1C 7.9 (H) 4.3 - 6.0 %   Comprehensive metabolic panel   Result Value Ref Range    Sodium 141 133 - 144 mmol/L    Potassium 4.5 3.4 - 5.3 mmol/L    Chloride 102 94 - 109 mmol/L    Carbon Dioxide 28 20 - 32 mmol/L    Anion Gap 11 3 - 14 mmol/L    Glucose 160 (H) 70 - 99 mg/dL    Urea Nitrogen 25 7 - 30 mg/dL    Creatinine 1.39 (H) 0.52 - 1.04 mg/dL    GFR Estimate 37 (L) >60 mL/min/1.7m2    GFR Estimate If Black 45 (L) >60 mL/min/1.7m2    Calcium 9.7 8.5 - 10.1 mg/dL    Bilirubin Total 0.6 0.2 - 1.3 mg/dL    Albumin 3.9 3.4 - 5.0 g/dL    Protein Total 7.4 6.8 - 8.8 g/dL    Alkaline Phosphatase 68 40 - 150 U/L    ALT 32 0 - 50 U/L    AST 16 0 - 45 U/L   Lipid panel reflex to direct LDL   Result Value Ref Range    Cholesterol 121 <200 mg/dL    Triglycerides 159 (H) <150 mg/dL    HDL Cholesterol 46 (L) >49 mg/dL    LDL Cholesterol Calculated 43 <100 mg/dL    Non HDL Cholesterol 75 <130 mg/dL   CBC with platelets differential   Result Value Ref Range    WBC 9.5 4.0 - 11.0 10e9/L    RBC Count 4.63 3.8 - 5.2 10e12/L    Hemoglobin 14.1 11.7 - 15.7 g/dL    Hematocrit 43.6 35.0 - 47.0 %    MCV 94 78 - 100 fl    MCH 30.5 26.5 - 33.0 pg    MCHC 32.3 31.5 - 36.5 g/dL    RDW 13.5 10.0 - 15.0 %    Platelet Count 221 150 - 450 10e9/L    Diff Method Automated Method     % Neutrophils 67.0 %    % Lymphocytes 20.3 %    % Monocytes 9.2 %    % Eosinophils 2.8 %    % Basophils 0.7 %    Absolute Neutrophil 6.3 1.6 - 8.3 10e9/L     Absolute Lymphocytes 1.9 0.8 - 5.3 10e9/L    Absolute Monocytes 0.9 0.0 - 1.3 10e9/L    Absolute Eosinophils 0.3 0.0 - 0.7 10e9/L    Absolute Basophils 0.1 0.0 - 0.2 10e9/L   TSH with free T4 reflex   Result Value Ref Range    TSH 0.62 0.40 - 4.00 mU/L       If you have any questions or concerns, please call myself or my nurse at [phone].      Sincerely,        Cris Huber MD/[place]

## 2017-08-03 NOTE — NURSING NOTE
"Chief Complaint   Patient presents with     Recheck Medication       Initial /68 (BP Location: Right arm, Patient Position: Chair, Cuff Size: Adult Large)  Pulse 78  Temp 98.2  F (36.8  C)  Ht 5' 9\" (1.753 m)  Wt 238 lb 3.2 oz (108 kg)  SpO2 97%  BMI 35.18 kg/m2 Estimated body mass index is 35.18 kg/(m^2) as calculated from the following:    Height as of this encounter: 5' 9\" (1.753 m).    Weight as of this encounter: 238 lb 3.2 oz (108 kg).  Medication Reconciliation: complete   Silvia Ybarra MA      "

## 2017-08-03 NOTE — PATIENT INSTRUCTIONS
[facility]    If you have any questions regarding to your visit please contact your care team:       Team Purple:   Clinic Hours Telephone Number   Dr. Cris Julio     7am-7pm  Monday - Thursday   7am-5pm  Fridays  [phone]  (Appointment scheduling available 24/7)    Questions about your Visit?   Team Line:  [phone]   Urgent Care - [place] and [facility] - 11am-9pm Monday-Friday Saturday-Sunday- 9am-5pm   [place] - 5pm-9pm Monday-Friday Saturday-Sunday- 9am-5pm  [phone] - [facility]  [phone] - [place]       What options do I have for visits at the clinic other than the traditional office visit?  To expand how we care for you, many of our providers are utilizing electronic visits (e-visits) and telephone visits, when medically appropriate, for interactions with their patients rather than a visit in the clinic.   We also offer nurse visits for many medical concerns. Just like any other service, we will bill your insurance company for this type of visit based on time spent on the phone with your provider. Not all insurance companies cover these visits. Please check with your medical insurance if this type of visit is covered. You will be responsible for any charges that are not paid by your insurance.      E-visits via [employer]:  generally incur a $35.00 fee.  Telephone visits:  Time spent on the phone: *charged based on time that is spent on the phone in increments of 10 minutes. Estimated cost:   5-10 mins $30.00   11-20 mins. $59.00   21-30 mins. $85.00     Use [employer] (secure email communication and access to your chart) to send your primary care provider a message or make an appointment. Ask someone on your Team how to sign up for [employer].  For a Price Quote for your services, please call our Consumer Price Line at [phone].  As always, Thank you for trusting us with your health care needs!

## 2017-08-17 ENCOUNTER — TELEPHONE (OUTPATIENT)
Dept: FAMILY MEDICINE | Facility: CLINIC | Age: 72
End: 2017-08-17

## 2017-09-06 DIAGNOSIS — E11.51 TYPE II DIABETES MELLITUS WITH PERIPHERAL CIRCULATORY DISORDER (H): ICD-10-CM

## 2017-09-06 DIAGNOSIS — N18.30 CKD (CHRONIC KIDNEY DISEASE) STAGE 3, GFR 30-59 ML/MIN (H): ICD-10-CM

## 2017-09-07 NOTE — TELEPHONE ENCOUNTER
REFILLS AVAILABLE AT PHARMACY.  PLEASE CLOSE   Furosemide      Last Written Prescription Date: 8/3/17  Last Fill Quantity: 90, # refills: 4  Last Office Visit with G, P or [facility] prescribing provider: 8/3/17  Next 5 appointments (look out 90 days)     Nov 03, 2017  9:30 AM CDT   Office Visit with Cris Huber MD   [facility] ([facility]    [address] [address]   [phone]                   Potassium   Date Value Ref Range Status   08/03/2017 4.5 3.4 - 5.3 mmol/L Final     Creatinine   Date Value Ref Range Status   08/03/2017 1.39 (H) 0.52 - 1.04 mg/dL Final     BP Readings from Last 3 Encounters:   08/03/17 128/68   04/27/17 132/68   04/06/17 132/68

## 2017-09-08 RX ORDER — FUROSEMIDE 40 MG
TABLET ORAL
Qty: 90 TABLET | Refills: 0
Start: 2017-09-08

## 2017-10-05 NOTE — TELEPHONE ENCOUNTER
Patient doesn't want her mammogram scheduled at this time due to shoulder issues.  She will schedule at a later date.  DOROTHY Bustos
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
## 2017-11-03 ENCOUNTER — OFFICE VISIT (OUTPATIENT)
Dept: FAMILY MEDICINE | Facility: CLINIC | Age: 72
End: 2017-11-03
Payer: COMMERCIAL

## 2017-11-03 VITALS
DIASTOLIC BLOOD PRESSURE: 64 MMHG | BODY MASS INDEX: 34.66 KG/M2 | WEIGHT: 234 LBS | SYSTOLIC BLOOD PRESSURE: 128 MMHG | OXYGEN SATURATION: 97 % | HEART RATE: 93 BPM | HEIGHT: 69 IN | TEMPERATURE: 98.6 F

## 2017-11-03 DIAGNOSIS — E11.21 TYPE 2 DIABETES MELLITUS WITH DIABETIC NEPHROPATHY, WITHOUT LONG-TERM CURRENT USE OF INSULIN (H): Primary | ICD-10-CM

## 2017-11-03 DIAGNOSIS — Z23 NEED FOR PROPHYLACTIC VACCINATION AND INOCULATION AGAINST INFLUENZA: ICD-10-CM

## 2017-11-03 DIAGNOSIS — Z13.5 SCREENING FOR DIABETIC RETINOPATHY: ICD-10-CM

## 2017-11-03 DIAGNOSIS — M19.019 AC (ACROMIOCLAVICULAR) JOINT ARTHRITIS: ICD-10-CM

## 2017-11-03 DIAGNOSIS — G89.4 CHRONIC PAIN SYNDROME: ICD-10-CM

## 2017-11-03 LAB
ALBUMIN SERPL-MCNC: 3.5 G/DL (ref 3.4–5)
ALP SERPL-CCNC: 63 U/L (ref 40–150)
ALT SERPL W P-5'-P-CCNC: 29 U/L (ref 0–50)
ANION GAP SERPL CALCULATED.3IONS-SCNC: 9 MMOL/L (ref 3–14)
AST SERPL W P-5'-P-CCNC: 17 U/L (ref 0–45)
BILIRUB SERPL-MCNC: 0.7 MG/DL (ref 0.2–1.3)
BUN SERPL-MCNC: 36 MG/DL (ref 7–30)
CALCIUM SERPL-MCNC: 9.6 MG/DL (ref 8.5–10.1)
CHLORIDE SERPL-SCNC: 102 MMOL/L (ref 94–109)
CO2 SERPL-SCNC: 28 MMOL/L (ref 20–32)
CREAT SERPL-MCNC: 1.6 MG/DL (ref 0.52–1.04)
CREAT UR-MCNC: 122 MG/DL
GFR SERPL CREATININE-BSD FRML MDRD: 31 ML/MIN/1.7M2
GLUCOSE SERPL-MCNC: 145 MG/DL (ref 70–99)
HBA1C MFR BLD: 8.1 % (ref 4.3–6)
MICROALBUMIN UR-MCNC: 24 MG/L
MICROALBUMIN/CREAT UR: 20.08 MG/G CR (ref 0–25)
POTASSIUM SERPL-SCNC: 4.4 MMOL/L (ref 3.4–5.3)
PROT SERPL-MCNC: 7.2 G/DL (ref 6.8–8.8)
SODIUM SERPL-SCNC: 139 MMOL/L (ref 133–144)

## 2017-11-03 PROCEDURE — 99214 OFFICE O/P EST MOD 30 MIN: CPT | Mod: 25 | Performed by: FAMILY MEDICINE

## 2017-11-03 PROCEDURE — 36415 COLL VENOUS BLD VENIPUNCTURE: CPT | Performed by: FAMILY MEDICINE

## 2017-11-03 PROCEDURE — G0008 ADMIN INFLUENZA VIRUS VAC: HCPCS | Performed by: FAMILY MEDICINE

## 2017-11-03 PROCEDURE — 82043 UR ALBUMIN QUANTITATIVE: CPT | Performed by: FAMILY MEDICINE

## 2017-11-03 PROCEDURE — 90662 IIV NO PRSV INCREASED AG IM: CPT | Performed by: FAMILY MEDICINE

## 2017-11-03 PROCEDURE — 83036 HEMOGLOBIN GLYCOSYLATED A1C: CPT | Performed by: FAMILY MEDICINE

## 2017-11-03 PROCEDURE — 80053 COMPREHEN METABOLIC PANEL: CPT | Performed by: FAMILY MEDICINE

## 2017-11-03 RX ORDER — TRAMADOL HYDROCHLORIDE 50 MG/1
TABLET ORAL
Qty: 180 TABLET | Refills: 2 | Status: SHIPPED | OUTPATIENT
Start: 2017-11-03 | End: 2018-02-14

## 2017-11-03 ASSESSMENT — ANXIETY QUESTIONNAIRES
GAD7 TOTAL SCORE: 0
2. NOT BEING ABLE TO STOP OR CONTROL WORRYING: NOT AT ALL
5. BEING SO RESTLESS THAT IT IS HARD TO SIT STILL: NOT AT ALL
1. FEELING NERVOUS, ANXIOUS, OR ON EDGE: NOT AT ALL
3. WORRYING TOO MUCH ABOUT DIFFERENT THINGS: NOT AT ALL
7. FEELING AFRAID AS IF SOMETHING AWFUL MIGHT HAPPEN: NOT AT ALL
6. BECOMING EASILY ANNOYED OR IRRITABLE: NOT AT ALL
IF YOU CHECKED OFF ANY PROBLEMS ON THIS QUESTIONNAIRE, HOW DIFFICULT HAVE THESE PROBLEMS MADE IT FOR YOU TO DO YOUR WORK, TAKE CARE OF THINGS AT HOME, OR GET ALONG WITH OTHER PEOPLE: NOT DIFFICULT AT ALL

## 2017-11-03 ASSESSMENT — PATIENT HEALTH QUESTIONNAIRE - PHQ9
SUM OF ALL RESPONSES TO PHQ QUESTIONS 1-9: 1
5. POOR APPETITE OR OVEREATING: NOT AT ALL

## 2017-11-03 NOTE — LETTER
[facility]  [address] [place] [address]    November 7, 2017    Anamaria Parra  [address] [place] [address]          Dear Anamaria,    Normal electrolytes. Kidney function is a bit worse than it was 3 months ago but better than 1 year ago.  This should be rechecked again in 3 months, unless directed otherwise by Dr. Huber.   Normal liver blood test. Normal urine    Enclosed is a copy of your results.     Results for orders placed or performed in visit on 11/03/17   HEMOGLOBIN A1C   Result Value Ref Range    Hemoglobin A1C 8.1 (H) 4.3 - 6.0 %   Albumin Random Urine Quantitative with Creat Ratio   Result Value Ref Range    Creatinine Urine 122 mg/dL    Albumin Urine mg/L 24 mg/L    Albumin Urine mg/g Cr 20.08 0 - 25 mg/g Cr   Comprehensive metabolic panel   Result Value Ref Range    Sodium 139 133 - 144 mmol/L    Potassium 4.4 3.4 - 5.3 mmol/L    Chloride 102 94 - 109 mmol/L    Carbon Dioxide 28 20 - 32 mmol/L    Anion Gap 9 3 - 14 mmol/L    Glucose 145 (H) 70 - 99 mg/dL    Urea Nitrogen 36 (H) 7 - 30 mg/dL    Creatinine 1.60 (H) 0.52 - 1.04 mg/dL    GFR Estimate 31 (L) >60 mL/min/1.7m2    GFR Estimate If Black 38 (L) >60 mL/min/1.7m2    Calcium 9.6 8.5 - 10.1 mg/dL    Bilirubin Total 0.7 0.2 - 1.3 mg/dL    Albumin 3.5 3.4 - 5.0 g/dL    Protein Total 7.2 6.8 - 8.8 g/dL    Alkaline Phosphatase 63 40 - 150 U/L    ALT 29 0 - 50 U/L    AST 17 0 - 45 U/L       If you have any questions or concerns, please call myself or my nurse at [phone].      Sincerely,        Cris Huber MD/[facility]

## 2017-11-03 NOTE — PATIENT INSTRUCTIONS
[facility]    If you have any questions regarding to your visit please contact your care team:       Team Purple:   Clinic Hours Telephone Number   Dr. Cris Vences   7am-7pm  Monday - Thursday   7am-5pm  Fridays  [phone]  (Appointment scheduling available 24/7)    Questions about your Visit?   Team Line:  [phone]   Urgent Care - [place] and [facility] - 11am-9pm Monday-Friday Saturday-Sunday- 9am-5pm   [place] - 5pm-9pm Monday-Friday Saturday-Sunday- 9am-5pm  [phone] - [facility]  [phone] - [place]       What options do I have for visits at the clinic other than the traditional office visit?  To expand how we care for you, many of our providers are utilizing electronic visits (e-visits) and telephone visits, when medically appropriate, for interactions with their patients rather than a visit in the clinic.   We also offer nurse visits for many medical concerns. Just like any other service, we will bill your insurance company for this type of visit based on time spent on the phone with your provider. Not all insurance companies cover these visits. Please check with your medical insurance if this type of visit is covered. You will be responsible for any charges that are not paid by your insurance.      E-visits via [employer]:  generally incur a $35.00 fee.  Telephone visits:  Time spent on the phone: *charged based on time that is spent on the phone in increments of 10 minutes. Estimated cost:   5-10 mins $30.00   11-20 mins. $59.00   21-30 mins. $85.00     Use [employer] (secure email communication and access to your chart) to send your primary care provider a message or make an appointment. Ask someone on your Team how to sign up for [employer].  For a Price Quote for your services, please call our Consumer Price Line at [phone].  As always, Thank you for trusting us with your health care needs!

## 2017-11-03 NOTE — NURSING NOTE
"Chief Complaint   Patient presents with     Recheck Medication     Health Maintenance     PHQ9, Dexa, Mammo       Initial /64  Pulse 93  Temp 98.6  F (37  C) (Oral)  Ht 5' 9\" (1.753 m)  Wt 234 lb (106.1 kg)  SpO2 97%  BMI 34.56 kg/m2 Estimated body mass index is 34.56 kg/(m^2) as calculated from the following:    Height as of this encounter: 5' 9\" (1.753 m).    Weight as of this encounter: 234 lb (106.1 kg).  Medication Reconciliation: complete     Kelsie Hernandez MA       "

## 2017-11-03 NOTE — MR AVS SNAPSHOT
After Visit Summary   11/3/2017    Anamaria Parra    MRN: [id number]           Patient Information     Date Of Birth          1945        Visit Information        Provider Department      11/3/2017 9:30 AM Cris Huber MD [facility]        Today's Diagnoses     Type 2 diabetes mellitus with diabetic nephropathy, without long-term current use of insulin (H)    -  1    Chronic pain syndrome        Screening for diabetic retinopathy        AC (acromioclavicular) joint arthritis - right        Need for prophylactic vaccination and inoculation against influenza          Care Instructions    [facility]    If you have any questions regarding to your visit please contact your care team:       Team Purple:   Clinic Hours Telephone Number   Dr. Cris Vences   7am-7pm  Monday - Thursday   7am-5pm  Fridays  [phone]  (Appointment scheduling available 24/7)    Questions about your Visit?   Team Line:  [phone]   Urgent Care - [place] [facility] - 11am-9pm Monday-Friday Saturday-Sunday- 9am-5pm   [place] - 5pm-9pm Monday-Friday Saturday-Sunday- 9am-5pm  [phone] - [place]   [phone] - [place]       What options do I have for visits at the clinic other than the traditional office visit?  To expand how we care for you, many of our providers are utilizing electronic visits (e-visits) and telephone visits, when medically appropriate, for interactions with their patients rather than a visit in the clinic.   We also offer nurse visits for many medical concerns. Just like any other service, we will bill your insurance company for this type of visit based on time spent on the phone with your provider. Not all insurance companies cover these visits. Please check with your medical insurance if this type of visit is covered. You will be responsible for any charges that are not paid  by your insurance.      E-visits via [employer]:  generally incur a $35.00 fee.  Telephone visits:  Time spent on the phone: *charged based on time that is spent on the phone in increments of 10 minutes. Estimated cost:   5-10 mins $30.00   11-20 mins. $59.00   21-30 mins. $85.00     Use [employer] (secure email communication and access to your chart) to send your primary care provider a message or make an appointment. Ask someone on your Team how to sign up for [employer].  For a Price Quote for your services, please call our PlayDo Line at [phone].  As always, Thank you for trusting us with your health care needs!              Follow-ups after your visit        Additional Services     OPHTHALMOLOGY ADULT REFERRAL       Your provider has referred you to: [facility]: Nay Durbin [phone]   [email or web address]    Please be aware that coverage of these services is subject to the terms and limitations of your health insurance plan.  Call member services at your health plan with any benefit or coverage questions.      Please bring the following with you to your appointment:    (1) Any X-Rays, CTs or MRIs which have been performed.  Contact the facility where they were done to arrange for  prior to your scheduled appointment.    (2) List of current medications  (3) This referral request   (4) Any documents/labs given to you for this referral                  Who to contact     If you have questions or need follow up information about today's clinic visit or your schedule please contact [facility] directly at [phone].  Normal or non-critical lab and imaging results will be communicated to you by MyChart, letter or phone within 4 business days after the clinic has received the results. If you do not hear from us within 7 days, please contact the clinic through MyChart or phone. If you have a critical or abnormal lab result, we will notify you by phone  "as soon as possible.  Submit refill requests through [employer] or call your pharmacy and they will forward the refill request to us. Please allow 3 business days for your refill to be completed.          Additional Information About Your Visit        [employer] Information     [employer] lets you send messages to your doctor, view your test results, renew your prescriptions, schedule appointments and more. To sign up, go to [email or web address] the [email or web address] . Click on \"Log in\" on the left side of the screen, which will take you to the Welcome page. Then click on \"Sign up Now\" on the right side of the page.     You will be asked to enter the access code listed below, as well as some personal information. Please follow the directions to create your username and password.     Your access code is: [email or web address]  Expires: 2018 10:02 AM     Your access code will  in 90 days. If you need help or a new code, please call your [place] clinic or [phone].        Care EveryWhere ID     This is your Care EveryWhere ID. This could be used by other organizations to access your [place] medical records  [id number]        Your Vitals Were     Pulse Temperature Height Pulse Oximetry BMI (Body Mass Index)       93 98.6  F (37  C) (Oral) 5' 9\" (1.753 m) 97% 34.56 kg/m2        Blood Pressure from Last 3 Encounters:   17 128/64   17 128/68   17 132/68    Weight from Last 3 Encounters:   17 234 lb (106.1 kg)   17 238 lb 3.2 oz (108 kg)   17 241 lb (109.3 kg)              We Performed the Following     Albumin Random Urine Quantitative with Creat Ratio     Comprehensive metabolic panel     HEMOGLOBIN A1C     OPHTHALMOLOGY ADULT REFERRAL          Where to get your medicines      Some of these will need a paper prescription and others can be bought over the counter.  Ask your nurse if you have questions.     Bring a paper prescription for each of these medications     traMADol 50 MG tablet          Primary " Care Provider Office Phone # Fax #    Cris Huber [phone]       [address] [address]        Equal Access to Services     EMMANUELLE WALKER : Geoffrey pako hitchcock delfinoo Soomaali, waaxda luqadaha, qaybta kaalmada adefermín, petty andrain hayaamarcel colesgarrick guo charlotte brooks. So [facility] [phone].    ATENCIÓN: Si habla español, tiene a keen disposición servicios gratuitos de asistencia lingüística. Llame al [phone].    We comply with applicable federal civil rights laws and [place] laws. We do not discriminate on the basis of race, color, national origin, age, disability, sex, sexual orientation, or gender identity.            Thank you!     Thank you for choosing [facility]  for your care. Our goal is always to provide you with excellent care. Hearing back from our patients is one way we can continue to improve our services. Please take a few minutes to complete the written survey that you may receive in the mail after your visit with us. Thank you!             Your Updated Medication List - Protect others around you: Learn how to safely use, store and throw away your medicines at www.disposemymeds.org.          This list is accurate as of: 11/3/17 10:02 AM.  Always use your most recent med list.                   Brand Name Dispense Instructions for use Diagnosis    aspirin 325 MG EC tablet     100 Tab    take 1 Tab by mouth daily.    Diabetes mellitus, type 2 (H)       B-12 PO      1000 MCG DAILY        fish oil-omega-3 fatty acids 1000 MG capsule      2 TABLETS DAILY        FOLIC ACID PO      3000 MCG DAILY        furosemide 40 MG tablet    LASIX    90 tablet    Take 1 tablet (40 mg) by mouth daily    CKD (chronic kidney disease) stage 3, GFR 30-59 ml/min       glipiZIDE 2.5 MG 24 hr tablet    GLUCOTROL XL    90 tablet    Take 1 tablet (2.5 mg) by mouth daily (with breakfast)    Type 2 diabetes mellitus with diabetic nephropathy,  without long-term current use of insulin (H)       lisinopril 20 MG tablet    PRINIVIL/ZESTRIL    90 tablet    Take 1 tablet (20 mg) by mouth daily    Benign essential hypertension       metFORMIN 500 MG tablet    GLUCOPHAGE    360 tablet    Take 2 tablets (1,000 mg) by mouth 2 times daily (with meals)    Type 2 diabetes mellitus with diabetic nephropathy, without long-term current use of insulin (H)       metoprolol 50 MG 24 hr tablet    TOPROL-XL    135 tablet    Take 1.5 tablets (75 mg) by mouth daily    Benign essential hypertension       mometasone 0.1 % cream    ELOCON    45 g    Apply sparingly to affected area twice daily as needed.  Do not apply to face.    Allergic contact dermatitis, unspecified trigger       Multi-vitamin Tabs tablet   Generic drug:  multivitamin, therapeutic with minerals      1 TABLET DAILY        nitroGLYcerin 0.4 MG sublingual tablet    NITROSTAT    60 tablet    Place 1 tablet (0.4 mg) under the tongue every 5 minutes as needed up to 3 tablets per episode.    Coronary artery disease involving autologous artery coronary bypass graft without angina pectoris       POMEGRANATE EXTRACT PO      Take by mouth daily Reported on 4/6/2017        rosuvastatin 40 MG tablet    CRESTOR    90 tablet    Take 1 tablet (40 mg) by mouth daily Replaces Lipitor because her insurance approved Crestor for another year    Hyperlipidemia LDL goal <100       STOOL SOFTENER PO      None Entered        traMADol 50 MG tablet    ULTRAM    180 tablet    Take 2 every 8 hours, maximum 6 tablet(s) per day    Chronic pain syndrome, AC (acromioclavicular) joint arthritis       vitamin D 1000 UNITS capsule      1 CAPSULE DAILY

## 2017-11-03 NOTE — PROGRESS NOTES

## 2017-11-04 ASSESSMENT — ANXIETY QUESTIONNAIRES: GAD7 TOTAL SCORE: 0

## 2017-11-07 NOTE — PROGRESS NOTES
Normal electrolytes. Kidney function is a bit worse than it was 3 months ago but better than 1 year ago.  This should be rechecked again in 3 months, unless directed otherwise by Dr. Huber.  Normal liver blood test. Normal urine.   Dr. Andres

## 2017-12-21 ENCOUNTER — TELEPHONE (OUTPATIENT)
Dept: FAMILY MEDICINE | Facility: CLINIC | Age: 72
End: 2017-12-21

## 2017-12-21 NOTE — TELEPHONE ENCOUNTER
Panel Management Review      Patient has the following on her problem list:     Diabetes    ASA: Passed    Last A1C  Lab Results   Component Value Date    A1C 8.1 11/03/2017    A1C 7.9 08/03/2017    A1C 8.2 04/06/2017    A1C 7.8 12/20/2016    A1C 8.4 08/02/2016     A1C tested: FAILED    Last LDL:    Lab Results   Component Value Date    CHOL 121 08/03/2017     Lab Results   Component Value Date    HDL 46 08/03/2017     Lab Results   Component Value Date    LDL 43 08/03/2017     Lab Results   Component Value Date    TRIG 159 08/03/2017     Lab Results   Component Value Date    CHOLHDLRATIO 2.7 06/23/2015     Lab Results   Component Value Date    NHDL 75 08/03/2017       Is the patient on a Statin? YES             Is the patient on Aspirin? YES    Medications     HMG CoA Reductase Inhibitors    rosuvastatin (CRESTOR) 40 MG tablet    Salicylates    aspirin 325 MG EC tablet          Last three blood pressure readings:  BP Readings from Last 3 Encounters:   11/03/17 128/64   08/03/17 128/68   04/27/17 132/68       Date of last diabetes office visit: 11/03/2017     Tobacco History:     History   Smoking Status     Former Smoker     Years: 30.00     Types: Cigarettes     Start date: 1/1/1960     Quit date: 1/1/1990   Smokeless Tobacco     Never Used             Composite cancer screening  Chart review shows that this patient is due/due soon for the following None  Summary:    Patient is due/failing the following:   Failing A1C    Action needed:   None- patient was just in for an office visit with Dr. Huber on 11/03/2017 and was instructed by Dr. Huber to return to clinic in 3 months to recheck Diabetes.    Type of outreach:    none    Questions for provider review:    None                                                                                                                                    An NAMAN Cat       Chart routed to none .

## 2017-12-26 ENCOUNTER — TRANSFERRED RECORDS (OUTPATIENT)
Dept: HEALTH INFORMATION MANAGEMENT | Facility: CLINIC | Age: 72
End: 2017-12-26

## 2017-12-27 ENCOUNTER — TRANSFERRED RECORDS (OUTPATIENT)
Dept: HEALTH INFORMATION MANAGEMENT | Facility: CLINIC | Age: 72
End: 2017-12-27

## 2017-12-28 ENCOUNTER — TRANSFERRED RECORDS (OUTPATIENT)
Dept: HEALTH INFORMATION MANAGEMENT | Facility: CLINIC | Age: 72
End: 2017-12-28
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
## 2018-01-04 ENCOUNTER — OFFICE VISIT (OUTPATIENT)
Dept: FAMILY MEDICINE | Facility: CLINIC | Age: 73
End: 2018-01-04
Payer: COMMERCIAL

## 2018-01-04 VITALS
HEART RATE: 91 BPM | HEIGHT: 69 IN | BODY MASS INDEX: 34.21 KG/M2 | DIASTOLIC BLOOD PRESSURE: 54 MMHG | OXYGEN SATURATION: 95 % | SYSTOLIC BLOOD PRESSURE: 118 MMHG | WEIGHT: 231 LBS | RESPIRATION RATE: 24 BRPM | TEMPERATURE: 98 F

## 2018-01-04 DIAGNOSIS — K21.9 GASTROESOPHAGEAL REFLUX DISEASE WITHOUT ESOPHAGITIS: ICD-10-CM

## 2018-01-04 DIAGNOSIS — I73.9 PAD (PERIPHERAL ARTERY DISEASE) (H): ICD-10-CM

## 2018-01-04 DIAGNOSIS — I25.810 CORONARY ARTERY DISEASE INVOLVING AUTOLOGOUS ARTERY CORONARY BYPASS GRAFT WITHOUT ANGINA PECTORIS: Primary | ICD-10-CM

## 2018-01-04 DIAGNOSIS — Z95.5 S/P CORONARY ARTERY STENT PLACEMENT: ICD-10-CM

## 2018-01-04 DIAGNOSIS — E11.21 TYPE 2 DIABETES MELLITUS WITH DIABETIC NEPHROPATHY, WITHOUT LONG-TERM CURRENT USE OF INSULIN (H): ICD-10-CM

## 2018-01-04 PROCEDURE — 99214 OFFICE O/P EST MOD 30 MIN: CPT | Performed by: FAMILY MEDICINE

## 2018-01-04 RX ORDER — CLOPIDOGREL BISULFATE 75 MG/1
75 TABLET ORAL DAILY
COMMUNITY
Start: 2017-12-30 | End: 2018-01-04

## 2018-01-04 RX ORDER — OMEPRAZOLE 40 MG/1
40 CAPSULE, DELAYED RELEASE ORAL DAILY
Qty: 90 CAPSULE | Refills: 3 | Status: SHIPPED | OUTPATIENT
Start: 2018-01-04 | End: 2018-11-25

## 2018-01-04 RX ORDER — CLOPIDOGREL BISULFATE 75 MG/1
75 TABLET ORAL DAILY
Qty: 90 TABLET | Refills: 3 | Status: SHIPPED | OUTPATIENT
Start: 2018-01-04 | End: 2019-02-15

## 2018-01-04 NOTE — MR AVS SNAPSHOT
After Visit Summary   1/4/2018    Anamaria Parra    MRN: [id number]           Patient Information     Date Of Birth          1945        Visit Information        Provider Department      1/4/2018 9:30 AM Cris Huber MD [facility]        Today's Diagnoses     Coronary artery disease involving autologous artery coronary bypass graft without angina pectoris    -  1    Gastroesophageal reflux disease without esophagitis        PAD (peripheral artery disease) (H)        S/P coronary artery stent placement          Care Instructions    [facility]    If you have any questions regarding to your visit please contact your care team:       Team Purple:   Clinic Hours Telephone Number   Dr. Cris Vences   7am-7pm  Monday - Thursday   7am-5pm  Fridays  [phone]  (Appointment scheduling available 24/7)    Questions about your Visit?   Team Line:  [phone]   [facility] [place] [facility] 11am-9pm Monday-Friday Saturday-Sunday- 9am-5pm   [place] - 5pm-9pm Monday-Friday Saturday-Sunday- 9am-5pm  [phone] - [place]   [phone] - [place]       What options do I have for visits at the clinic other than the traditional office visit?  To expand how we care for you, many of our providers are utilizing electronic visits (e-visits) and telephone visits, when medically appropriate, for interactions with their patients rather than a visit in the clinic.   We also offer nurse visits for many medical concerns. Just like any other service, we will bill your insurance company for this type of visit based on time spent on the phone with your provider. Not all insurance companies cover these visits. Please check with your medical insurance if this type of visit is covered. You will be responsible for any charges that are not paid by your insurance.      E-visits via [employer]:  generally  "incur a $35.00 fee.  Telephone visits:  Time spent on the phone: *charged based on time that is spent on the phone in increments of 10 minutes. Estimated cost:   5-10 mins $30.00   11-20 mins. $59.00   21-30 mins. $85.00     Use [employer] (secure email communication and access to your chart) to send your primary care provider a message or make an appointment. Ask someone on your Team how to sign up for [employer].  For a Price Quote for your services, please call our [employer] Line at [phone].  As always, Thank you for trusting us with your health care needs!              Follow-ups after your visit        Who to contact     If you have questions or need follow up information about today's clinic visit or your schedule please contact [facility] directly at [phone].  Normal or non-critical lab and imaging results will be communicated to you by [employer], letter or phone within 4 business days after the clinic has received the results. If you do not hear from us within 7 days, please contact the clinic through [employer] or phone. If you have a critical or abnormal lab result, we will notify you by phone as soon as possible.  Submit refill requests through [employer] or call your pharmacy and they will forward the refill request to us. Please allow 3 business days for your refill to be completed.          Additional Information About Your Visit        [employer] Information     [employer] lets you send messages to your doctor, view your test results, renew your prescriptions, schedule appointments and more. To sign up, go to [email or web address] . Click on \"Log in\" on the left side of the screen, which will take you to the Welcome page. Then click on \"Sign up Now\" on the right side of the page.     You will be asked to enter the access code listed below, as well as some personal information. Please follow the directions to create your username and password.     Your access code is: [email or web address]  Expires: " "2018  9:02 AM     Your access code will  in 90 days. If you need help or a new code, please call your [place] clinic or [phone].        Care EveryWhere ID     This is your Care EveryWhere ID. This could be used by other organizations to access your [place] medical records  [id number]        Your Vitals Were     Pulse Temperature Respirations Height Pulse Oximetry BMI (Body Mass Index)    91 98  F (36.7  C) (Oral) 24 5' 8.62\" (1.743 m) 95% 34.49 kg/m2       Blood Pressure from Last 3 Encounters:   18 118/54   17 128/64   17 128/68    Weight from Last 3 Encounters:   18 231 lb (104.8 kg)   17 234 lb (106.1 kg)   17 238 lb 3.2 oz (108 kg)              Today, you had the following     No orders found for display         Today's Medication Changes          These changes are accurate as of: 18  9:56 AM.  If you have any questions, ask your nurse or doctor.               Start taking these medicines.        Dose/Directions    omeprazole 40 MG capsule   Commonly known as:  priLOSEC   Used for:  Gastroesophageal reflux disease without esophagitis   Started by:  Cris Huber MD        Dose:  40 mg   Take 1 capsule (40 mg) by mouth daily Take 30-60 minutes before a meal.   Quantity:  90 capsule   Refills:  3         These medicines have changed or have updated prescriptions.        Dose/Directions    aspirin 81 MG tablet   This may have changed:  Another medication with the same name was removed. Continue taking this medication, and follow the directions you see here.   Changed by:  Cris Huber MD        Dose:  81 mg   Take 81 mg by mouth daily   Refills:  0       metFORMIN 500 MG tablet   Commonly known as:  GLUCOPHAGE   This may have changed:  how much to take   Used for:  Type 2 diabetes mellitus with diabetic nephropathy, without long-term current use of insulin (H)        Dose:  1000 mg   Take 2 tablets (1,000 mg) by mouth 2 times daily (with " meals)   Quantity:  360 tablet   Refills:  4            Where to get your medicines      These medications were sent to [id number]  [place], [place] - [address] [facility] [address] [place] [address]     Phone:  [phone]     clopidogrel 75 MG tablet    omeprazole 40 MG capsule                Primary Care Provider Office Phone # Fax #    Cris Huber [phone]       [facility] [address] [id number]        Equal Access to Services     [facility]: Hadii aad ku hadasho Soomaali, waaxda luqadaha, qaybta kaalmada adeegyada, petty silvermanin haydaran ahmet porter . So [facility] [phone].    ATENCIÓN: Si habla español, tiene a keen disposición servicios gratuitos de asistencia lingüística. [facility] [phone].    We comply with applicable federal civil rights laws and [place] laws. We do not discriminate on the basis of race, color, national origin, age, disability, sex, sexual orientation, or gender identity.            Thank you!     Thank you for choosing [facility]  for your care. Our goal is always to provide you with excellent care. Hearing back from our patients is one way we can continue to improve our services. Please take a few minutes to complete the written survey that you may receive in the mail after your visit with us. Thank you!             Your Updated Medication List - Protect others around you: Learn how to safely use, store and throw away your medicines at www.disposemymeds.org.          This list is accurate as of: 1/4/18  9:56 AM.  Always use your most recent med list.                   Brand Name Dispense Instructions for use Diagnosis    aspirin 81 MG tablet      Take 81 mg by mouth daily        B-12 PO      1000 MCG DAILY        clopidogrel 75 MG tablet    PLAVIX    90 tablet    Take 1 tablet (75 mg) by mouth daily    S/P coronary artery stent  placement       fish oil-omega-3 fatty acids 1000 MG capsule      2 TABLETS DAILY        FOLIC ACID PO      3000 MCG DAILY        furosemide 40 MG tablet    LASIX    90 tablet    Take 1 tablet (40 mg) by mouth daily    CKD (chronic kidney disease) stage 3, GFR 30-59 ml/min       glipiZIDE 2.5 MG 24 hr tablet    GLUCOTROL XL    90 tablet    Take 1 tablet (2.5 mg) by mouth daily (with breakfast)    Type 2 diabetes mellitus with diabetic nephropathy, without long-term current use of insulin (H)       lisinopril 20 MG tablet    PRINIVIL/ZESTRIL    90 tablet    Take 1 tablet (20 mg) by mouth daily    Benign essential hypertension       metFORMIN 500 MG tablet    GLUCOPHAGE    360 tablet    Take 2 tablets (1,000 mg) by mouth 2 times daily (with meals)    Type 2 diabetes mellitus with diabetic nephropathy, without long-term current use of insulin (H)       metoprolol 50 MG 24 hr tablet    TOPROL-XL    135 tablet    Take 1.5 tablets (75 mg) by mouth daily    Benign essential hypertension       mometasone 0.1 % cream    ELOCON    45 g    Apply sparingly to affected area twice daily as needed.  Do not apply to face.    Allergic contact dermatitis, unspecified trigger       Multi-vitamin Tabs tablet   Generic drug:  multivitamin, therapeutic with minerals      1 TABLET DAILY        nitroGLYcerin 0.4 MG sublingual tablet    NITROSTAT    60 tablet    Place 1 tablet (0.4 mg) under the tongue every 5 minutes as needed up to 3 tablets per episode.    Coronary artery disease involving autologous artery coronary bypass graft without angina pectoris       omeprazole 40 MG capsule    priLOSEC    90 capsule    Take 1 capsule (40 mg) by mouth daily Take 30-60 minutes before a meal.    Gastroesophageal reflux disease without esophagitis       rosuvastatin 40 MG tablet    CRESTOR    90 tablet    Take 1 tablet (40 mg) by mouth daily Replaces Lipitor because her insurance approved Crestor for another year    Hyperlipidemia LDL goal <100        STOOL SOFTENER PO      None Entered        traMADol 50 MG tablet    ULTRAM    180 tablet    Take 2 every 8 hours, maximum 6 tablet(s) per day    Chronic pain syndrome, AC (acromioclavicular) joint arthritis       vitamin D 1000 UNITS capsule      1 CAPSULE DAILY

## 2018-01-04 NOTE — PATIENT INSTRUCTIONS
[facility]    If you have any questions regarding to your visit please contact your care team:       Team Purple:   Clinic Hours Telephone Number   Dr. Cris Vences   7am-7pm  Monday - Thursday   7am-5pm  Fridays  [phone]  (Appointment scheduling available 24/7)    Questions about your Visit?   Team Line:  [phone]   Urgent Care - [place] and [facility] - 11am-9pm Monday-Friday Saturday-Sunday- 9am-5pm   [place] - 5pm-9pm Monday-Friday Saturday-Sunday- 9am-5pm  [phone] - [facility]  [phone] - [place]       What options do I have for visits at the clinic other than the traditional office visit?  To expand how we care for you, many of our providers are utilizing electronic visits (e-visits) and telephone visits, when medically appropriate, for interactions with their patients rather than a visit in the clinic.   We also offer nurse visits for many medical concerns. Just like any other service, we will bill your insurance company for this type of visit based on time spent on the phone with your provider. Not all insurance companies cover these visits. Please check with your medical insurance if this type of visit is covered. You will be responsible for any charges that are not paid by your insurance.      E-visits via [employer]:  generally incur a $35.00 fee.  Telephone visits:  Time spent on the phone: *charged based on time that is spent on the phone in increments of 10 minutes. Estimated cost:   5-10 mins $30.00   11-20 mins. $59.00   21-30 mins. $85.00     Use [employer] (secure email communication and access to your chart) to send your primary care provider a message or make an appointment. Ask someone on your Team how to sign up for [employer].  For a Price Quote for your services, please call our Consumer Price Line at [phone].  As always, Thank you for trusting us with your health care needs!

## 2018-01-28 ENCOUNTER — TRANSFERRED RECORDS (OUTPATIENT)
Dept: HEALTH INFORMATION MANAGEMENT | Facility: CLINIC | Age: 73
End: 2018-01-28
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
## 2018-02-14 ENCOUNTER — OFFICE VISIT (OUTPATIENT)
Dept: FAMILY MEDICINE | Facility: CLINIC | Age: 73
End: 2018-02-14
Payer: COMMERCIAL

## 2018-02-14 VITALS
TEMPERATURE: 98.8 F | BODY MASS INDEX: 32.95 KG/M2 | WEIGHT: 222.5 LBS | SYSTOLIC BLOOD PRESSURE: 122 MMHG | OXYGEN SATURATION: 98 % | DIASTOLIC BLOOD PRESSURE: 60 MMHG | RESPIRATION RATE: 20 BRPM | HEIGHT: 69 IN | HEART RATE: 85 BPM

## 2018-02-14 DIAGNOSIS — M19.019 AC (ACROMIOCLAVICULAR) JOINT ARTHRITIS: ICD-10-CM

## 2018-02-14 DIAGNOSIS — G89.4 CHRONIC PAIN SYNDROME: ICD-10-CM

## 2018-02-14 DIAGNOSIS — E11.21 TYPE 2 DIABETES MELLITUS WITH DIABETIC NEPHROPATHY, WITHOUT LONG-TERM CURRENT USE OF INSULIN (H): Primary | ICD-10-CM

## 2018-02-14 DIAGNOSIS — N18.30 CKD (CHRONIC KIDNEY DISEASE) STAGE 3, GFR 30-59 ML/MIN (H): ICD-10-CM

## 2018-02-14 LAB
ANION GAP SERPL CALCULATED.3IONS-SCNC: 9 MMOL/L (ref 3–14)
BUN SERPL-MCNC: 24 MG/DL (ref 7–30)
CALCIUM SERPL-MCNC: 9.4 MG/DL (ref 8.5–10.1)
CHLORIDE SERPL-SCNC: 102 MMOL/L (ref 94–109)
CO2 SERPL-SCNC: 28 MMOL/L (ref 20–32)
CREAT SERPL-MCNC: 1.4 MG/DL (ref 0.52–1.04)
GFR SERPL CREATININE-BSD FRML MDRD: 37 ML/MIN/1.7M2
GLUCOSE SERPL-MCNC: 124 MG/DL (ref 70–99)
HBA1C MFR BLD: 7 % (ref 4.3–6)
POTASSIUM SERPL-SCNC: 4.1 MMOL/L (ref 3.4–5.3)
SODIUM SERPL-SCNC: 139 MMOL/L (ref 133–144)

## 2018-02-14 PROCEDURE — 80048 BASIC METABOLIC PNL TOTAL CA: CPT | Performed by: FAMILY MEDICINE

## 2018-02-14 PROCEDURE — 83036 HEMOGLOBIN GLYCOSYLATED A1C: CPT | Performed by: FAMILY MEDICINE

## 2018-02-14 PROCEDURE — 99214 OFFICE O/P EST MOD 30 MIN: CPT | Performed by: FAMILY MEDICINE

## 2018-02-14 PROCEDURE — 36415 COLL VENOUS BLD VENIPUNCTURE: CPT | Performed by: FAMILY MEDICINE

## 2018-02-14 RX ORDER — TRAMADOL HYDROCHLORIDE 50 MG/1
TABLET ORAL
Qty: 180 TABLET | Refills: 2 | Status: SHIPPED | OUTPATIENT
Start: 2018-02-14 | End: 2018-05-14

## 2018-02-14 NOTE — PATIENT INSTRUCTIONS
[facility]    If you have any questions regarding to your visit please contact your care team:       Team Purple:   Clinic Hours Telephone Number   Dr. Cris Vences   7am-7pm  Monday - Thursday   7am-5pm  Fridays  [phone]  (Appointment scheduling available 24/7)    Questions about your Visit?   Team Line:  [phone]   Urgent Care - [place] and [facility] - 11am-9pm Monday-Friday Saturday-Sunday- 9am-5pm   [place] - 5pm-9pm Monday-Friday Saturday-Sunday- 9am-5pm  [phone] - [facility]  [phone] - [place]       What options do I have for visits at the clinic other than the traditional office visit?  To expand how we care for you, many of our providers are utilizing electronic visits (e-visits) and telephone visits, when medically appropriate, for interactions with their patients rather than a visit in the clinic.   We also offer nurse visits for many medical concerns. Just like any other service, we will bill your insurance company for this type of visit based on time spent on the phone with your provider. Not all insurance companies cover these visits. Please check with your medical insurance if this type of visit is covered. You will be responsible for any charges that are not paid by your insurance.      E-visits via [employer]:  generally incur a $35.00 fee.  Telephone visits:  Time spent on the phone: *charged based on time that is spent on the phone in increments of 10 minutes. Estimated cost:   5-10 mins $30.00   11-20 mins. $59.00   21-30 mins. $85.00     Use [employer] It [employer] (secure email communication and access to your chart) to send your primary care provider a message or make an appointment. Ask someone on your Team how to sign up for [employer].  For a Price Quote for your services, please call our Consumer Price Line at [phone].  As always, Thank you for trusting us with your health care needs!

## 2018-02-14 NOTE — MR AVS SNAPSHOT
After Visit Summary   2/14/2018    Anamaria Parra    MRN: [id number]           Patient Information     Date Of Birth          1945        Visit Information        Provider Department      2/14/2018 9:00 AM Cris Hbuer MD [facility]        Today's Diagnoses     Type 2 diabetes mellitus with diabetic nephropathy, without long-term current use of insulin (H)    -  1    Chronic pain syndrome        AC (acromioclavicular) joint arthritis - right        CKD (chronic kidney disease) stage 3, GFR 30-59 ml/min          Care Instructions    [facility]    If you have any questions regarding to your visit please contact your care team:       Team Purple:   Clinic Hours Telephone Number   Dr. Cris Vences   7am-7pm  Monday - Thursday   7am-5pm  Fridays  [phone]  (Appointment scheduling available 24/7)    Questions about your Visit?   Team Line:  [phone]   [facility] - [place] [facility] - 11am-9pm Monday-Friday Saturday-Sunday- 9am-5pm   [place] - 5pm-9pm Monday-Friday Saturday-Sunday- 9am-5pm  [phone] - [place]   [phone] - [place]       What options do I have for visits at the clinic other than the traditional office visit?  To expand how we care for you, many of our providers are utilizing electronic visits (e-visits) and telephone visits, when medically appropriate, for interactions with their patients rather than a visit in the clinic.   We also offer nurse visits for many medical concerns. Just like any other service, we will bill your insurance company for this type of visit based on time spent on the phone with your provider. Not all insurance companies cover these visits. Please check with your medical insurance if this type of visit is covered. You will be responsible for any charges that are not paid by your insurance.      E-visits via Let[employer] Talk:  generally  incur a $35.00 fee.  Telephone visits:  Time spent on the phone: *charged based on time that is spent on the phone in increments of 10 minutes. Estimated cost:   5-10 mins $30.00   11-20 mins. $59.00   21-30 mins. $85.00     Use Next [employer] (secure email communication and access to your chart) to send your primary care provider a message or make an appointment. Ask someone on your Team how to sign up for [employer].  For a Price Quote for your services, please call our [employer] Price Line at [phone].  As always, Thank you for trusting us with your health care needs!              Follow-ups after your visit        Additional Services     ORTHOPEDICS ADULT REFERRAL       Your provider has referred you to: [facility]: [facility] [phone]    [email or web address]    Please be aware that coverage of these services is subject to the terms and limitations of your health insurance plan.  Call member services at your health plan with any benefit or coverage questions.      Please bring the following to your appointment:    >>   Any x-rays, CTs or MRIs which have been performed.  Contact the facility where they were done to arrange for  prior to your scheduled appointment.    >>   List of current medications   >>   This referral request   >>   Any documents/labs given to you for this referral                  Your next 10 appointments already scheduled     Feb 14, 2018  9:00 AM CST   Office Visit with Cris Huber MD   [facility] ([facility])    [address] [id number] [phone]           Bring a current list of meds and any records pertaining to this visit. For Physicals, please bring immunization records and any forms needing to be filled out. Please arrive 10 minutes early to complete paperwork.              Who to contact     If you have questions or need follow up information about today's clinic visit or your  "schedule please contact [facility] directly at [phone].  Normal or non-critical lab and imaging results will be communicated to you by MyChart, letter or phone within 4 business days after the clinic has received the results. If you do not hear from us within 7 days, please contact the clinic through MyChart or phone. If you have a critical or abnormal lab result, we will notify you by phone as soon as possible.  Submit refill requests through [facility] or call your pharmacy and they will forward the refill request to us. Please allow 3 business days for your refill to be completed.          Additional Information About Your Visit        [employer] Information     [facility] lets you send messages to your doctor, view your test results, renew your prescriptions, schedule appointments and more. To sign up, go to [email or web address] . Click on \"Log in\" on the left side of the screen, which will take you to the Welcome page. Then click on \"Sign up Now\" on the right side of the page.     You will be asked to enter the access code listed below, as well as some personal information. Please follow the directions to create your username and password.     Your access code is: [email or web address]  Expires: 5/15/2018  8:47 AM     Your access code will  in 90 days. If you need help or a new code, please call your [facility] clinic or [phone].        Care EveryWhere ID     This is your Care EveryWhere ID. This could be used by other organizations to access your [facility] medical records  [id number]        Your Vitals Were     Pulse Temperature Respirations Height Pulse Oximetry BMI (Body Mass Index)    85 98.8  F (37.1  C) (Oral) 20 5' 8.62\" (1.743 m) 98% 33.22 kg/m2       Blood Pressure from Last 3 Encounters:   18 122/60   18 118/54   17 128/64    Weight from Last 3 Encounters:   18 222 lb 8 oz (100.9 kg)   18 231 lb (104.8 kg)   17 234 lb (106.1 kg)              We " Performed the Following     Basic metabolic panel     HEMOGLOBIN A1C     ORTHOPEDICS ADULT REFERRAL          Today's Medication Changes          These changes are accurate as of 2/14/18  8:47 AM.  If you have any questions, ask your nurse or doctor.               These medicines have changed or have updated prescriptions.        Dose/Directions    metFORMIN 500 MG tablet   Commonly known as:  GLUCOPHAGE   This may have changed:  how much to take   Used for:  Type 2 diabetes mellitus with diabetic nephropathy, without long-term current use of insulin (H)        Dose:  1000 mg   Take 2 tablets (1,000 mg) by mouth 2 times daily (with meals)   Quantity:  360 tablet   Refills:  4            Where to get your medicines      Some of these will need a paper prescription and others can be bought over the counter.  Ask your nurse if you have questions.     Bring a paper prescription for each of these medications     traMADol 50 MG tablet                Primary Care Provider Office Phone # Fax #    Cris Huber [phone]       [address] [address]        Equal Access to Services     EMMANUELLE WALKER : Hadii pako ku hadasho Soomaali, waaxda luqadaha, qaybta kaalmada adeegyada, waxay matt porter . So [facility] [phone].    ATENCIÓN: Si habla español, tiene a keen disposición servicios gratuitos de asistencia lingüística. Dayanna al [phone].    We comply with applicable federal civil rights laws and [place] laws. We do not discriminate on the basis of race, color, national origin, age, disability, sex, sexual orientation, or gender identity.            Thank you!     Thank you for choosing [facility]  for your care. Our goal is always to provide you with excellent care. Hearing back from our patients is one way we can continue to improve our services. Please take a few minutes to complete the written survey that you may  receive in the mail after your visit with us. Thank you!             Your Updated Medication List - Protect others around you: Learn how to safely use, store and throw away your medicines at www.disposemymeds.org.          This list is accurate as of 2/14/18  8:47 AM.  Always use your most recent med list.                   Brand Name Dispense Instructions for use Diagnosis    aspirin 81 MG tablet      Take 81 mg by mouth daily        B-12 PO      1000 MCG DAILY        clopidogrel 75 MG tablet    PLAVIX    90 tablet    Take 1 tablet (75 mg) by mouth daily    S/P coronary artery stent placement       fish oil-omega-3 fatty acids 1000 MG capsule      2 TABLETS DAILY        FOLIC ACID PO      3000 MCG DAILY        furosemide 40 MG tablet    LASIX    90 tablet    Take 1 tablet (40 mg) by mouth daily    CKD (chronic kidney disease) stage 3, GFR 30-59 ml/min       glipiZIDE 2.5 MG 24 hr tablet    GLUCOTROL XL    90 tablet    Take 1 tablet (2.5 mg) by mouth daily (with breakfast)    Type 2 diabetes mellitus with diabetic nephropathy, without long-term current use of insulin (H)       lisinopril 20 MG tablet    PRINIVIL/ZESTRIL    90 tablet    Take 1 tablet (20 mg) by mouth daily    Benign essential hypertension       metFORMIN 500 MG tablet    GLUCOPHAGE    360 tablet    Take 2 tablets (1,000 mg) by mouth 2 times daily (with meals)    Type 2 diabetes mellitus with diabetic nephropathy, without long-term current use of insulin (H)       metoprolol succinate 50 MG 24 hr tablet    TOPROL-XL    135 tablet    Take 1.5 tablets (75 mg) by mouth daily    Benign essential hypertension       mometasone 0.1 % cream    ELOCON    45 g    Apply sparingly to affected area twice daily as needed.  Do not apply to face.    Allergic contact dermatitis, unspecified trigger       Multi-vitamin Tabs tablet   Generic drug:  multivitamin, therapeutic with minerals      1 TABLET DAILY        nitroGLYcerin 0.4 MG sublingual tablet    NITROSTAT     60 tablet    Place 1 tablet (0.4 mg) under the tongue every 5 minutes as needed up to 3 tablets per episode.    Coronary artery disease involving autologous artery coronary bypass graft without angina pectoris       omeprazole 40 MG capsule    priLOSEC    90 capsule    Take 1 capsule (40 mg) by mouth daily Take 30-60 minutes before a meal.    Gastroesophageal reflux disease without esophagitis       rosuvastatin 40 MG tablet    CRESTOR    90 tablet    Take 1 tablet (40 mg) by mouth daily Replaces Lipitor because her insurance approved Crestor for another year    Hyperlipidemia LDL goal <100       STOOL SOFTENER PO      None Entered        traMADol 50 MG tablet    ULTRAM    180 tablet    Take 2 every 8 hours, maximum 6 tablet(s) per day    Chronic pain syndrome, AC (acromioclavicular) joint arthritis       vitamin D 1000 UNITS capsule      1 CAPSULE DAILY

## 2018-02-14 NOTE — LETTER
[address] [place] [address]    February 15, 2018    Anamaria Parra  [address] [place] [address]          Dear Anamaria,    The rest of your blood tests are looking good and stable for you. See me again in 3 months. Continue your healthy lifestyle. I am so proud of you to quit the cookies and soda pop.    Enclosed is a copy of your results.     Results for orders placed or performed in visit on 02/14/18   HEMOGLOBIN A1C   Result Value Ref Range    Hemoglobin A1C 7.0 (H) 4.3 - 6.0 %   Basic metabolic panel   Result Value Ref Range    Sodium 139 133 - 144 mmol/L    Potassium 4.1 3.4 - 5.3 mmol/L    Chloride 102 94 - 109 mmol/L    Carbon Dioxide 28 20 - 32 mmol/L    Anion Gap 9 3 - 14 mmol/L    Glucose 124 (H) 70 - 99 mg/dL    Urea Nitrogen 24 7 - 30 mg/dL    Creatinine 1.40 (H) 0.52 - 1.04 mg/dL    GFR Estimate 37 (L) >60 mL/min/1.7m2    GFR Estimate If Black 44 (L) >60 mL/min/1.7m2    Calcium 9.4 8.5 - 10.1 mg/dL       If you have any questions or concerns, please call myself or my nurse at [phone].      Sincerely,        Cris Huber MD/esthela
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
## 2018-05-14 ENCOUNTER — OFFICE VISIT (OUTPATIENT)
Dept: FAMILY MEDICINE | Facility: CLINIC | Age: 73
End: 2018-05-14
Payer: COMMERCIAL

## 2018-05-14 VITALS
OXYGEN SATURATION: 97 % | SYSTOLIC BLOOD PRESSURE: 110 MMHG | TEMPERATURE: 97.4 F | HEIGHT: 69 IN | BODY MASS INDEX: 33.18 KG/M2 | WEIGHT: 224 LBS | HEART RATE: 113 BPM | RESPIRATION RATE: 18 BRPM | DIASTOLIC BLOOD PRESSURE: 70 MMHG

## 2018-05-14 DIAGNOSIS — G89.4 CHRONIC PAIN SYNDROME: ICD-10-CM

## 2018-05-14 DIAGNOSIS — M19.019 AC (ACROMIOCLAVICULAR) JOINT ARTHRITIS: ICD-10-CM

## 2018-05-14 DIAGNOSIS — I25.810 CORONARY ARTERY DISEASE INVOLVING AUTOLOGOUS ARTERY CORONARY BYPASS GRAFT WITHOUT ANGINA PECTORIS: ICD-10-CM

## 2018-05-14 DIAGNOSIS — E78.5 HYPERLIPIDEMIA LDL GOAL <100: ICD-10-CM

## 2018-05-14 DIAGNOSIS — N18.30 CKD (CHRONIC KIDNEY DISEASE) STAGE 3, GFR 30-59 ML/MIN (H): ICD-10-CM

## 2018-05-14 DIAGNOSIS — I10 BENIGN ESSENTIAL HYPERTENSION: ICD-10-CM

## 2018-05-14 DIAGNOSIS — Z13.89 SCREENING FOR DIABETIC PERIPHERAL NEUROPATHY: ICD-10-CM

## 2018-05-14 DIAGNOSIS — E11.21 TYPE 2 DIABETES MELLITUS WITH DIABETIC NEPHROPATHY, WITHOUT LONG-TERM CURRENT USE OF INSULIN (H): Primary | ICD-10-CM

## 2018-05-14 DIAGNOSIS — Z12.31 ENCOUNTER FOR SCREENING MAMMOGRAM FOR BREAST CANCER: ICD-10-CM

## 2018-05-14 LAB
ALBUMIN SERPL-MCNC: 3.7 G/DL (ref 3.4–5)
ALP SERPL-CCNC: 84 U/L (ref 40–150)
ALT SERPL W P-5'-P-CCNC: 29 U/L (ref 0–50)
ANION GAP SERPL CALCULATED.3IONS-SCNC: 9 MMOL/L (ref 3–14)
AST SERPL W P-5'-P-CCNC: 19 U/L (ref 0–45)
BILIRUB SERPL-MCNC: 0.4 MG/DL (ref 0.2–1.3)
BUN SERPL-MCNC: 38 MG/DL (ref 7–30)
CALCIUM SERPL-MCNC: 9.4 MG/DL (ref 8.5–10.1)
CHLORIDE SERPL-SCNC: 108 MMOL/L (ref 94–109)
CO2 SERPL-SCNC: 23 MMOL/L (ref 20–32)
CREAT SERPL-MCNC: 1.33 MG/DL (ref 0.52–1.04)
GFR SERPL CREATININE-BSD FRML MDRD: 39 ML/MIN/1.7M2
GLUCOSE SERPL-MCNC: 179 MG/DL (ref 70–99)
HBA1C MFR BLD: 7.5 % (ref 0–5.6)
POTASSIUM SERPL-SCNC: 4.3 MMOL/L (ref 3.4–5.3)
PROT SERPL-MCNC: 7.2 G/DL (ref 6.8–8.8)
SODIUM SERPL-SCNC: 140 MMOL/L (ref 133–144)

## 2018-05-14 PROCEDURE — 80053 COMPREHEN METABOLIC PANEL: CPT | Performed by: FAMILY MEDICINE

## 2018-05-14 PROCEDURE — 36415 COLL VENOUS BLD VENIPUNCTURE: CPT | Performed by: FAMILY MEDICINE

## 2018-05-14 PROCEDURE — 99214 OFFICE O/P EST MOD 30 MIN: CPT | Performed by: FAMILY MEDICINE

## 2018-05-14 PROCEDURE — 99207 C FOOT EXAM  NO CHARGE: CPT | Performed by: FAMILY MEDICINE

## 2018-05-14 PROCEDURE — 83036 HEMOGLOBIN GLYCOSYLATED A1C: CPT | Performed by: FAMILY MEDICINE

## 2018-05-14 RX ORDER — LISINOPRIL 20 MG/1
20 TABLET ORAL DAILY
Qty: 90 TABLET | Refills: 4 | Status: SHIPPED | OUTPATIENT
Start: 2018-05-14 | End: 2019-07-02

## 2018-05-14 RX ORDER — METOPROLOL SUCCINATE 50 MG/1
75 TABLET, EXTENDED RELEASE ORAL DAILY
Qty: 135 TABLET | Refills: 5 | Status: SHIPPED | OUTPATIENT
Start: 2018-05-14 | End: 2019-07-02

## 2018-05-14 RX ORDER — ROSUVASTATIN CALCIUM 40 MG/1
40 TABLET, COATED ORAL DAILY
Qty: 90 TABLET | Refills: 3 | Status: SHIPPED | OUTPATIENT
Start: 2018-05-14 | End: 2019-07-02

## 2018-05-14 RX ORDER — FUROSEMIDE 40 MG
40 TABLET ORAL DAILY
Qty: 90 TABLET | Refills: 4 | Status: SHIPPED | OUTPATIENT
Start: 2018-05-14 | End: 2019-07-25

## 2018-05-14 RX ORDER — GLIPIZIDE 2.5 MG/1
2.5 TABLET, EXTENDED RELEASE ORAL
Qty: 90 TABLET | Refills: 4 | Status: SHIPPED | OUTPATIENT
Start: 2018-05-14 | End: 2019-02-15

## 2018-05-14 RX ORDER — TRAMADOL HYDROCHLORIDE 50 MG/1
TABLET ORAL
Qty: 180 TABLET | Refills: 2 | Status: SHIPPED | OUTPATIENT
Start: 2018-05-14 | End: 2018-08-13

## 2018-05-14 RX ORDER — NITROGLYCERIN 0.4 MG/1
0.4 TABLET SUBLINGUAL EVERY 5 MIN PRN
Qty: 60 TABLET | Refills: 12 | Status: SHIPPED | OUTPATIENT
Start: 2018-05-14 | End: 2019-08-13

## 2018-05-14 NOTE — PATIENT INSTRUCTIONS
[facility]    If you have any questions regarding to your visit please contact your care team:       Team Purple:   Clinic Hours Telephone Number   Dr. Cris Vences   7am-7pm  Monday - Thursday   7am-5pm  Fridays  [phone]  (Appointment scheduling available 24/7)    Questions about your recent visit?   Team Line:  [phone]   Urgent Care - [place] and [facility] - 11am-9pm Monday-Friday Saturday-Sunday- 9am-5pm   [place] - 5pm-9pm Monday-Friday Saturday-Sunday- 9am-5pm  [phone] - [place]  [phone] [facility]       What options do I have for a visit other than an office visit? We offer electronic visits (e-visits) and telephone visits, when medically appropriate.  Please check with your medical insurance to see if these types of visits are covered, as you will be responsible for any charges that are not paid by your insurance.      You can use [employer] App (secure electronic communication) to access to your chart, send your primary care provider a message, or make an appointment. Ask a team member how to get started.     For a price quote for your services, please call our Consumer Price Line at [phone] or our Imaging Cost estimation line at [phone] (for imaging tests).

## 2018-05-14 NOTE — LETTER
[address] [place] [address]    May 14, 2018    Anamaria Parra  [address] [place] [address]    Dear Anamaria,    Though your hemoglobin was worse, your kidney functions have improved this visit. I feel confident that your sugars will be better and that your kidney function will stay in a good range with the addition of metformin, 1000 mg twice a day. Please see me again in 3 months.    Enclosed is a copy of your results.     Results for orders placed or performed in visit on 05/14/18   HEMOGLOBIN A1C   Result Value Ref Range    Hemoglobin A1C 7.5 (H) 0 - 5.6 %   Comprehensive metabolic panel   Result Value Ref Range    Sodium 140 133 - 144 mmol/L    Potassium 4.3 3.4 - 5.3 mmol/L    Chloride 108 94 - 109 mmol/L    Carbon Dioxide 23 20 - 32 mmol/L    Anion Gap 9 3 - 14 mmol/L    Glucose 179 (H) 70 - 99 mg/dL    Urea Nitrogen 38 (H) 7 - 30 mg/dL    Creatinine 1.33 (H) 0.52 - 1.04 mg/dL    GFR Estimate 39 (L) >60 mL/min/1.7m2    GFR Estimate If Black 47 (L) >60 mL/min/1.7m2    Calcium 9.4 8.5 - 10.1 mg/dL    Bilirubin Total 0.4 0.2 - 1.3 mg/dL    Albumin 3.7 3.4 - 5.0 g/dL    Protein Total 7.2 6.8 - 8.8 g/dL    Alkaline Phosphatase 84 40 - 150 U/L    ALT 29 0 - 50 U/L    AST 19 0 - 45 U/L   If you have any questions or concerns, please call myself or my nurse at [phone].      Sincerely,      Cris Huber MD/esthela

## 2018-05-14 NOTE — MR AVS SNAPSHOT
After Visit Summary   5/14/2018    Anamaria Parra    MRN: [id number]           Patient Information     Date Of Birth          1945        Visit Information        Provider Department      5/14/2018 9:00 AM Cris Huber MD [facility]        Today's Diagnoses     Type 2 diabetes mellitus with diabetic nephropathy, without long-term current use of insulin (H)    -  1    CKD (chronic kidney disease) stage 3, GFR 30-59 ml/min        Chronic pain syndrome        Benign essential hypertension        Coronary artery disease involving autologous artery coronary bypass graft without angina pectoris        Hyperlipidemia LDL goal <100        AC (acromioclavicular) joint arthritis - right        Encounter for screening mammogram for breast cancer        Screening for diabetic peripheral neuropathy          Care Instructions    [facility]    If you have any questions regarding to your visit please contact your care team:       Team Purple:   Clinic Hours Telephone Number   Dr. Cris Vences   7am-7pm  Monday - Thursday   7am-5pm  Fridays  [phone]  (Appointment scheduling available 24/7)    Questions about your recent visit?   Team Line:  [phone]   Urgent Care - [facility] and [facility] - 11am-9pm Monday-Friday Saturday-Sunday- 9am-5pm   [place] - 5pm-9pm Monday-Friday Saturday-Sunday- 9am-5pm  [phone] - Mica Arreguin  [phone] - [place]       What options do I have for a visit other than an office visit? We offer electronic visits (e-visits) and telephone visits, when medically appropriate.  Please check with your medical insurance to see if these types of visits are covered, as you will be responsible for any charges that are not paid by your insurance.      You can use [employer] (secure electronic communication) to access to your chart, send your primary care provider a message, or make an  appointment. Ask a team member how to get started.     For a price quote for your services, please call our Consumer Price Line at [phone] or our Imaging Cost estimation line at [phone] (for imaging tests).              Follow-ups after your visit        Additional Services     OPHTHALMOLOGY ADULT REFERRAL       Your provider has referred you to: [facility]EESH: [facility] [phone]   [email or web address]    Please be aware that coverage of these services is subject to the terms and limitations of your health insurance plan.  Call member services at your health plan with any benefit or coverage questions.      Please bring the following with you to your appointment:    (1) Any X-Rays, CTs or MRIs which have been performed.  Contact the facility where they were done to arrange for  prior to your scheduled appointment.    (2) List of current medications  (3) This referral request   (4) Any documents/labs given to you for this referral                  Future tests that were ordered for you today     Open Future Orders        Priority Expected Expires Ordered    *MA Screening Digital Bilateral Routine  5/14/2019 5/14/2018            Who to contact     If you have questions or need follow up information about today's clinic visit or your schedule please contact [facility] directly at [phone].  Normal or non-critical lab and imaging results will be communicated to you by MyChart, letter or phone within 4 business days after the clinic has received the results. If you do not hear from us within 7 days, please contact the clinic through MyChart or phone. If you have a critical or abnormal lab result, we will notify you by phone as soon as possible.  Submit refill requests through [employer] or call your pharmacy and they will forward the refill request to us. Please allow 3 business days for your refill to be completed.          Additional Information  "About Your Visit        [facility] Information     [facility] lets you send messages to your doctor, view your test results, renew your prescriptions, schedule appointments and more. To sign up, go to [email or web address] . Click on \"Log in\" on the left side of the screen, which will take you to the Welcome page. Then click on \"Sign up Now\" on the right side of the page.     You will be asked to enter the access code listed below, as well as some personal information. Please follow the directions to create your username and password.     Your access code is: [email or web address]  Expires: 5/15/2018  9:47 AM     Your access code will  in 90 days. If you need help or a new code, please call your [facility] clinic or [phone].        Care EveryWhere ID     This is your Care EveryWhere ID. This could be used by other organizations to access your [facility] medical records  [id number]        Your Vitals Were     Pulse Temperature Respirations Height Pulse Oximetry BMI (Body Mass Index)    113 97.4  F (36.3  C) (Oral) 18 5' 8.62\" (1.743 m) 97% 33.44 kg/m2       Blood Pressure from Last 3 Encounters:   18 110/70   18 122/60   18 118/54    Weight from Last 3 Encounters:   18 224 lb (101.6 kg)   18 222 lb 8 oz (100.9 kg)   18 231 lb (104.8 kg)              We Performed the Following     Comprehensive metabolic panel     FOOT EXAM  NO CHARGE [29027.114]     HEMOGLOBIN A1C     OPHTHALMOLOGY ADULT REFERRAL          Today's Medication Changes          These changes are accurate as of 18  9:12 AM.  If you have any questions, ask your nurse or doctor.               These medicines have changed or have updated prescriptions.        Dose/Directions    metFORMIN 500 MG tablet   Commonly known as:  GLUCOPHAGE   This may have changed:  how much to take   Used for:  Type 2 diabetes mellitus with diabetic nephropathy, without long-term current use of insulin (H)        Dose:  1000 mg   Take 2 tablets " (1,000 mg) by mouth 2 times daily (with meals)   Quantity:  360 tablet   Refills:  4            Where to get your medicines      These medications were sent to [id number] - [place], [place] - [address] AT [facility] [address] [place] [address]     Phone:  [phone]     furosemide 40 MG tablet    glipiZIDE 2.5 MG 24 hr tablet    lisinopril 20 MG tablet    metFORMIN 500 MG tablet    metoprolol succinate 50 MG 24 hr tablet    nitroGLYcerin 0.4 MG sublingual tablet    rosuvastatin 40 MG tablet         Some of these will need a paper prescription and others can be bought over the counter.  Ask your nurse if you have questions.     Bring a paper prescription for each of these medications     traMADol 50 MG tablet               Information about OPIOIDS     PRESCRIPTION OPIOIDS: WHAT YOU NEED TO KNOW   You have a prescription for an opioid (narcotic) pain medicine. Opioids can cause addiction. If you have a history of chemical dependency of any type, you are at a higher risk of becoming addicted to opioids. Only take this medicine after all other options have been tried. Take it for as short a time and as few doses as possible.     Do not:    Drive. If you drive while taking these medicines, you could be arrested for driving under the influence (DUI).    Operate heavy machinery    Do any other dangerous activities while taking these medicines.     Drink any alcohol while taking these medicines.      Take with any other medicines that contain acetaminophen. Read all labels carefully. Look for the word  acetaminophen  or  Tylenol.  Ask your pharmacist if you have questions or are unsure.    Store your pills in a secure place, locked if possible. We will not replace any lost or stolen medicine. If you don t finish your medicine, please throw away (dispose) as directed by your pharmacist. The [place] Pollution Control Agency has more information about safe disposal:  https://www.pca.[facility].[place].us/living-green/managing-unwanted-medications    All opioids tend to cause constipation. Drink plenty of water and eat foods that have a lot of fiber, such as fruits, vegetables, prune juice, apple juice and high-fiber cereal. Take a laxative (Miralax, milk of magnesia, Colace, Senna) if you don t move your bowels at least every other day.          Primary Care Provider Office Phone # Fax #    Cris Huber [phone] [address] [facility] [address]        Equal Access to Services     SARAH WALKER : Hadii pako Donald, wacecilio alcaraz, qaybta kaalmada sebas, petty brooks. So [facility] [place] [facility] [phone].    ATENCIÓN: Si habla español, tiene a keen disposición servicios gratuitos de asistencia lingüística. [facility] [phone].    We comply with applicable federal civil rights laws and [place] laws. We do not discriminate on the basis of race, color, national origin, age, disability, sex, sexual orientation, or gender identity.            Thank you!     Thank you for choosing [facility]  for your care. Our goal is always to provide you with excellent care. Hearing back from our patients is one way we can continue to improve our services. Please take a few minutes to complete the written survey that you may receive in the mail after your visit with us. Thank you!             Your Updated Medication List - Protect others around you: Learn how to safely use, store and throw away your medicines at www.disposemymeds.org.          This list is accurate as of 5/14/18  9:12 AM.  Always use your most recent med list.                   Brand Name Dispense Instructions for use Diagnosis    aspirin 81 MG tablet      Take 81 mg by mouth daily        B-12 PO      1000 MCG DAILY        clopidogrel 75 MG tablet    PLAVIX    90 tablet    Take 1 tablet (75 mg) by mouth daily    S/P coronary artery stent placement        fish oil-omega-3 fatty acids 1000 MG capsule      2 TABLETS DAILY        FOLIC ACID PO      3000 MCG DAILY        furosemide 40 MG tablet    LASIX    90 tablet    Take 1 tablet (40 mg) by mouth daily    CKD (chronic kidney disease) stage 3, GFR 30-59 ml/min       glipiZIDE 2.5 MG 24 hr tablet    GLUCOTROL XL    90 tablet    Take 1 tablet (2.5 mg) by mouth daily (with breakfast)    Type 2 diabetes mellitus with diabetic nephropathy, without long-term current use of insulin (H)       lisinopril 20 MG tablet    PRINIVIL/ZESTRIL    90 tablet    Take 1 tablet (20 mg) by mouth daily    Benign essential hypertension       metFORMIN 500 MG tablet    GLUCOPHAGE    360 tablet    Take 2 tablets (1,000 mg) by mouth 2 times daily (with meals)    Type 2 diabetes mellitus with diabetic nephropathy, without long-term current use of insulin (H)       metoprolol succinate 50 MG 24 hr tablet    TOPROL-XL    135 tablet    Take 1.5 tablets (75 mg) by mouth daily    Benign essential hypertension       mometasone 0.1 % cream    ELOCON    45 g    Apply sparingly to affected area twice daily as needed.  Do not apply to face.    Allergic contact dermatitis, unspecified trigger       Multi-vitamin Tabs tablet   Generic drug:  multivitamin, therapeutic with minerals      1 TABLET DAILY        nitroGLYcerin 0.4 MG sublingual tablet    NITROSTAT    60 tablet    Place 1 tablet (0.4 mg) under the tongue every 5 minutes as needed up to 3 tablets per episode.    Coronary artery disease involving autologous artery coronary bypass graft without angina pectoris       omeprazole 40 MG capsule    priLOSEC    90 capsule    Take 1 capsule (40 mg) by mouth daily Take 30-60 minutes before a meal.    Gastroesophageal reflux disease without esophagitis       rosuvastatin 40 MG tablet    CRESTOR    90 tablet    Take 1 tablet (40 mg) by mouth daily Replaces Lipitor because her insurance approved Crestor for another year    Hyperlipidemia LDL goal <100        STOOL SOFTENER PO      None Entered        traMADol 50 MG tablet    ULTRAM    180 tablet    Take 2 every 8 hours, maximum 6 tablet(s) per day    Chronic pain syndrome, AC (acromioclavicular) joint arthritis       vitamin D 1000 units capsule      1 CAPSULE DAILY

## 2018-07-22 DIAGNOSIS — E78.5 HYPERLIPIDEMIA LDL GOAL <100: ICD-10-CM

## 2018-07-23 RX ORDER — ROSUVASTATIN CALCIUM 40 MG/1
40 TABLET, COATED ORAL
COMMUNITY
End: 2018-08-13

## 2018-07-23 RX ORDER — ROSUVASTATIN CALCIUM 40 MG/1
TABLET, COATED ORAL
Qty: 90 TABLET | Refills: 0 | OUTPATIENT
Start: 2018-07-23

## 2018-07-23 NOTE — TELEPHONE ENCOUNTER
Signed Prescriptions:                        Disp   Refills    rosuvastatin (CRESTOR) 40 MG tablet                        Sig: Take 40 mg by mouth  Authorizing Provider: PATIENT REPORTED  Ordering User: EMANI BETTS    Refused Prescriptions:                       Disp   Refills    rosuvastatin (CRESTOR) 40 MG tablet [Pharm*90 tab*0        Sig: TAKE 1 TABLET BY MOUTH EVERY DAY  Refused By: MICAELA BEAL  Reason for Refusal: Duplicate    rosuvastatin (CRESTOR) 40 MG tablet [Pharm*90 tab*0        Sig: TAKE 1 TABLET BY MOUTH EVERY DAY  Refused By: MICAELA BEAL  Reason for Refusal: Duplicate    rosuvastatin (CRESTOR) 40 MG tablet [Pharm*90 tab*0        Sig: TAKE 1 TABLET BY MOUTH EVERY DAY  Refused By: MICAELA BEAL  Reason for Refusal: Duplicate    Micaela Beal RN on 7/23/2018 at 2:24 PM

## 2018-07-23 NOTE — TELEPHONE ENCOUNTER
rosuvastatin (CRESTOR) 40 MG tablet 90 tablet 3 5/14/2018  No      Sig - Route: Take 1 tablet (40 mg) by mouth daily Replaces Lipitor because her insurance approved Crestor for another year - Oral     Class: E-Prescribe     Order: [id number]     E-Prescribing Status: Receipt confirmed by pharmacy (5/14/2018  9:09 AM CDT)             Called Pharmacy verified duplicate refills.  Please disregard.   Thank you  April
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
## 2018-08-13 ENCOUNTER — OFFICE VISIT (OUTPATIENT)
Dept: FAMILY MEDICINE | Facility: CLINIC | Age: 73
End: 2018-08-13
Payer: COMMERCIAL

## 2018-08-13 VITALS
WEIGHT: 223 LBS | BODY MASS INDEX: 33.03 KG/M2 | OXYGEN SATURATION: 97 % | SYSTOLIC BLOOD PRESSURE: 130 MMHG | HEART RATE: 104 BPM | HEIGHT: 69 IN | DIASTOLIC BLOOD PRESSURE: 64 MMHG | RESPIRATION RATE: 20 BRPM | TEMPERATURE: 98.2 F

## 2018-08-13 DIAGNOSIS — E78.5 HYPERLIPIDEMIA LDL GOAL <100: ICD-10-CM

## 2018-08-13 DIAGNOSIS — M19.019 AC (ACROMIOCLAVICULAR) JOINT ARTHRITIS: ICD-10-CM

## 2018-08-13 DIAGNOSIS — E11.21 TYPE 2 DIABETES MELLITUS WITH DIABETIC NEPHROPATHY, WITHOUT LONG-TERM CURRENT USE OF INSULIN (H): Primary | ICD-10-CM

## 2018-08-13 DIAGNOSIS — G89.4 CHRONIC PAIN SYNDROME: ICD-10-CM

## 2018-08-13 DIAGNOSIS — I10 BENIGN ESSENTIAL HYPERTENSION: ICD-10-CM

## 2018-08-13 DIAGNOSIS — E05.90 HYPERTHYROIDISM: ICD-10-CM

## 2018-08-13 LAB
BASOPHILS # BLD AUTO: 0 10E9/L (ref 0–0.2)
BASOPHILS NFR BLD AUTO: 0.5 %
CHOLEST SERPL-MCNC: 134 MG/DL
DIFFERENTIAL METHOD BLD: NORMAL
EOSINOPHIL # BLD AUTO: 0.2 10E9/L (ref 0–0.7)
EOSINOPHIL NFR BLD AUTO: 3.5 %
ERYTHROCYTE [DISTWIDTH] IN BLOOD BY AUTOMATED COUNT: 13.6 % (ref 10–15)
HBA1C MFR BLD: 7.2 % (ref 0–5.6)
HCT VFR BLD AUTO: 40.9 % (ref 35–47)
HDLC SERPL-MCNC: 43 MG/DL
HGB BLD-MCNC: 13.1 G/DL (ref 11.7–15.7)
LDLC SERPL CALC-MCNC: 61 MG/DL
LYMPHOCYTES # BLD AUTO: 1.7 10E9/L (ref 0.8–5.3)
LYMPHOCYTES NFR BLD AUTO: 27.5 %
MCH RBC QN AUTO: 30.6 PG (ref 26.5–33)
MCHC RBC AUTO-ENTMCNC: 32 G/DL (ref 31.5–36.5)
MCV RBC AUTO: 96 FL (ref 78–100)
MONOCYTES # BLD AUTO: 0.7 10E9/L (ref 0–1.3)
MONOCYTES NFR BLD AUTO: 11 %
NEUTROPHILS # BLD AUTO: 3.4 10E9/L (ref 1.6–8.3)
NEUTROPHILS NFR BLD AUTO: 57.5 %
NONHDLC SERPL-MCNC: 91 MG/DL
PLATELET # BLD AUTO: 228 10E9/L (ref 150–450)
RBC # BLD AUTO: 4.28 10E12/L (ref 3.8–5.2)
TRIGL SERPL-MCNC: 149 MG/DL
TSH SERPL DL<=0.005 MIU/L-ACNC: 0.8 MU/L (ref 0.4–4)
WBC # BLD AUTO: 6 10E9/L (ref 4–11)

## 2018-08-13 PROCEDURE — 83036 HEMOGLOBIN GLYCOSYLATED A1C: CPT | Performed by: FAMILY MEDICINE

## 2018-08-13 PROCEDURE — 84443 ASSAY THYROID STIM HORMONE: CPT | Performed by: FAMILY MEDICINE

## 2018-08-13 PROCEDURE — 99214 OFFICE O/P EST MOD 30 MIN: CPT | Performed by: FAMILY MEDICINE

## 2018-08-13 PROCEDURE — 36415 COLL VENOUS BLD VENIPUNCTURE: CPT | Performed by: FAMILY MEDICINE

## 2018-08-13 PROCEDURE — 80061 LIPID PANEL: CPT | Performed by: FAMILY MEDICINE

## 2018-08-13 PROCEDURE — 85025 COMPLETE CBC W/AUTO DIFF WBC: CPT | Performed by: FAMILY MEDICINE

## 2018-08-13 RX ORDER — TRAMADOL HYDROCHLORIDE 50 MG/1
TABLET ORAL
Qty: 180 TABLET | Refills: 2 | Status: SHIPPED | OUTPATIENT
Start: 2018-08-13 | End: 2018-11-14

## 2018-08-13 NOTE — PATIENT INSTRUCTIONS
[facility]    If you have any questions regarding to your visit please contact your care team:       Team Purple:   Clinic Hours Telephone Number   Dr. Cris Vences   7am-7pm  Monday - Thursday   7am-5pm  Fridays  [phone]  (Appointment scheduling available 24/7)    Questions about your recent visit?   Team Line:  [phone]   Urgent Care - [place] and [facility] - 11am-9pm Monday-Friday Saturday-Sunday- 9am-5pm   [place] - 5pm-9pm Monday-Friday Saturday-Sunday- 9am-5pm  [phone] - [place]  [phone] [facility]       What options do I have for a visit other than an office visit? We offer electronic visits (e-visits) and telephone visits, when medically appropriate.  Please check with your medical insurance to see if these types of visits are covered, as you will be responsible for any charges that are not paid by your insurance.      You can use [employer] (secure electronic communication) to access to your chart, send your primary care provider a message, or make an appointment. Ask a team member how to get started.     For a price quote for your services, please call our Consumer Price Line at [phone] or our Imaging Cost estimation line at [phone] (for imaging tests).

## 2018-08-13 NOTE — LETTER
[address] [place] [address]    August 13, 2018    Anamaria Parra  [address] [place] [address]          Dear Anamaria,    The rest of your blood tests are normal. Continue your healthy lifestyle. See me again in 3 months    Enclosed is a copy of your results.     Results for orders placed or performed in visit on 08/13/18   HEMOGLOBIN A1C   Result Value Ref Range    Hemoglobin A1C 7.2 (H) 0 - 5.6 %   Lipid panel reflex to direct LDL Fasting   Result Value Ref Range    Cholesterol 134 <200 mg/dL    Triglycerides 149 <150 mg/dL    HDL Cholesterol 43 (L) >49 mg/dL    LDL Cholesterol Calculated 61 <100 mg/dL    Non HDL Cholesterol 91 <130 mg/dL   TSH WITH FREE T4 REFLEX   Result Value Ref Range    TSH 0.80 0.40 - 4.00 mU/L   CBC with platelets differential   Result Value Ref Range    WBC 6.0 4.0 - 11.0 10e9/L    RBC Count 4.28 3.8 - 5.2 10e12/L    Hemoglobin 13.1 11.7 - 15.7 g/dL    Hematocrit 40.9 35.0 - 47.0 %    MCV 96 78 - 100 fl    MCH 30.6 26.5 - 33.0 pg    MCHC 32.0 31.5 - 36.5 g/dL    RDW 13.6 10.0 - 15.0 %    Platelet Count 228 150 - 450 10e9/L    Diff Method Automated Method     % Neutrophils 57.5 %    % Lymphocytes 27.5 %    % Monocytes 11.0 %    % Eosinophils 3.5 %    % Basophils 0.5 %    Absolute Neutrophil 3.4 1.6 - 8.3 10e9/L    Absolute Lymphocytes 1.7 0.8 - 5.3 10e9/L    Absolute Monocytes 0.7 0.0 - 1.3 10e9/L    Absolute Eosinophils 0.2 0.0 - 0.7 10e9/L    Absolute Basophils 0.0 0.0 - 0.2 10e9/L       If you have any questions or concerns, please call myself or my nurse at [phone].      Sincerely,        Cris Huber MD/esthela

## 2018-08-13 NOTE — MR AVS SNAPSHOT
After Visit Summary   8/13/2018    Anamaria Parra    MRN: [id number]           Patient Information     Date Of Birth          1945        Visit Information        Provider Department      8/13/2018 9:30 AM Cris Huebr MD [facility]        Today's Diagnoses     Type 2 diabetes mellitus with diabetic nephropathy, without long-term current use of insulin (H)    -  1    Chronic pain syndrome        AC (acromioclavicular) joint arthritis - right        Benign essential hypertension        Hyperthyroidism        Hyperlipidemia LDL goal <100          Care Instructions    [facility]    If you have any questions regarding to your visit please contact your care team:       Team Purple:   Clinic Hours Telephone Number   Dr. Cris Vences   7am-7pm  Monday - Thursday   7am-5pm  Fridays  [phone]  (Appointment scheduling available 24/7)    Questions about your recent visit?   Team Line:  [phone]   Urgent Care - [place] [facility] - 11am-9pm Monday-Friday Saturday-Sunday- 9am-5pm   [place] - 5pm-9pm Monday-Friday Saturday-Sunday- 9am-5pm  [phone] - Mica Arreguin  [phone] - [place]       What options do I have for a visit other than an office visit? We offer electronic visits (e-visits) and telephone visits, when medically appropriate.  Please check with your medical insurance to see if these types of visits are covered, as you will be responsible for any charges that are not paid by your insurance.      You can use BackupAgent (secure electronic communication) to access to your chart, send your primary care provider a message, or make an appointment. Ask a team member how to get started.     For a price quote for your services, please call our Consumer Price Line at [phone] or our Imaging Cost estimation line at [phone] (for imaging tests).              Follow-ups after your  "visit        Who to contact     If you have questions or need follow up information about today's clinic visit or your schedule please contact [facility] directly at [phone].  Normal or non-critical lab and imaging results will be communicated to you by MyChart, letter or phone within 4 business days after the clinic has received the results. If you do not hear from us within 7 days, please contact the clinic through MyChart or phone. If you have a critical or abnormal lab result, we will notify you by phone as soon as possible.  Submit refill requests through Pocket or call your pharmacy and they will forward the refill request to us. Please allow 3 business days for your refill to be completed.          Additional Information About Your Visit        Care EveryWhere ID     This is your Care EveryWhere ID. This could be used by other organizations to access your [facility] medical records  [id number]        Your Vitals Were     Pulse Temperature Respirations Height Pulse Oximetry BMI (Body Mass Index)    104 98.2  F (36.8  C) (Oral) 20 5' 8.62\" (1.743 m) 97% 33.3 kg/m2       Blood Pressure from Last 3 Encounters:   08/13/18 130/64   05/14/18 110/70   02/14/18 122/60    Weight from Last 3 Encounters:   08/13/18 223 lb (101.2 kg)   05/14/18 224 lb (101.6 kg)   02/14/18 222 lb 8 oz (100.9 kg)              We Performed the Following     CBC with platelets differential     HEMOGLOBIN A1C     Lipid panel reflex to direct LDL Fasting     PAF COMPLETED     TSH WITH FREE T4 REFLEX          Where to get your medicines      Some of these will need a paper prescription and others can be bought over the counter.  Ask your nurse if you have questions.     Bring a paper prescription for each of these medications     traMADol 50 MG tablet         Information about OPIOIDS     PRESCRIPTION OPIOIDS: WHAT YOU NEED TO KNOW   We gave you an opioid (narcotic) pain medicine. It is important to manage your pain, but " opioids are not always the best choice. You should first try all the other options your care team gave you. Take this medicine for as short a time (and as few doses) as possible.    Some activities can increase your pain, such as bandage changes or therapy sessions. It may help to take your pain medicine 30 to 60 minutes before these activities. Reduce your stress by getting enough sleep, working on hobbies you enjoy and practicing relaxation or meditation. Talk to your care team about ways to manage your pain beyond prescription opioids.    These medicines have risks:    DO NOT drive when on new or higher doses of pain medicine. These medicines can affect your alertness and reaction times, and you could be arrested for driving under the influence (DUI). If you need to use opioids long-term, talk to your care team about driving.    DO NOT operate heavy machinery    DO NOT do any other dangerous activities while taking these medicines.    DO NOT drink any alcohol while taking these medicines.     If the opioid prescribed includes acetaminophen, DO NOT take with any other medicines that contain acetaminophen. Read all labels carefully. Look for the word  acetaminophen  or  Tylenol.  Ask your pharmacist if you have questions or are unsure.    You can get addicted to pain medicines, especially if you have a history of addiction (chemical, alcohol or substance dependence). Talk to your care team about ways to reduce this risk.    All opioids tend to cause constipation. Drink plenty of water and eat foods that have a lot of fiber, such as fruits, vegetables, prune juice, apple juice and high-fiber cereal. Take a laxative (Miralax, milk of magnesia, Colace, Senna) if you don t move your bowels at least every other day. Other side effects include upset stomach, sleepiness, dizziness, throwing up, tolerance (needing more of the medicine to have the same effect), physical dependence and slowed breathing.    Store your pills  in a secure place, locked if possible. We will not replace any lost or stolen medicine. If you don t finish your medicine, please throw away (dispose) as directed by your pharmacist. The [place] Pollution [employer] has more information about safe disposal: [email or web address]://[email or web address]/[email or web address]-medications         Primary Care Provider Office Phone # Fax #    Cris Huber [phone] [facility] [phone]        Equal Access to Services     [facility]: Hadii aad ku hadasho Soomaali, waaxda luqadaha, qaybta kaalmada adeegyada, waxdion porter . So [facility] [phone].    ATENCIÓN: Si habla español, tiene a keen disposición servicios gratuitos de asistencia lingüística. [facility] [phone].    We comply with applicable federal civil rights laws and [place] laws. We do not discriminate on the basis of race, color, national origin, age, disability, sex, sexual orientation, or gender identity.            Thank you!     Thank you for choosing [facility]  for your care. Our goal is always to provide you with excellent care. Hearing back from our patients is one way we can continue to improve our services. Please take a few minutes to complete the written survey that you may receive in the mail after your visit with us. Thank you!             Your Updated Medication List - Protect others around you: Learn how to safely use, store and throw away your medicines at www.disposemymeds.org.          This list is accurate as of 8/13/18  9:33 AM.  Always use your most recent med list.                   Brand Name Dispense Instructions for use Diagnosis    aspirin 81 MG tablet      Take 81 mg by mouth daily        B-12 PO      1000 MCG DAILY        clopidogrel 75 MG tablet    PLAVIX    90 tablet    Take 1 tablet (75 mg) by mouth daily    S/P coronary artery stent placement       fish oil-omega-3 fatty acids  1000 MG capsule      2 TABLETS DAILY        FOLIC ACID PO      3000 MCG DAILY        furosemide 40 MG tablet    LASIX    90 tablet    Take 1 tablet (40 mg) by mouth daily    CKD (chronic kidney disease) stage 3, GFR 30-59 ml/min       glipiZIDE 2.5 MG 24 hr tablet    GLUCOTROL XL    90 tablet    Take 1 tablet (2.5 mg) by mouth daily (with breakfast)    Type 2 diabetes mellitus with diabetic nephropathy, without long-term current use of insulin (H)       lisinopril 20 MG tablet    PRINIVIL/ZESTRIL    90 tablet    Take 1 tablet (20 mg) by mouth daily    Benign essential hypertension       metFORMIN 500 MG tablet    GLUCOPHAGE    360 tablet    Take 2 tablets (1,000 mg) by mouth 2 times daily (with meals)    Type 2 diabetes mellitus with diabetic nephropathy, without long-term current use of insulin (H)       metoprolol succinate 50 MG 24 hr tablet    TOPROL-XL    135 tablet    Take 1.5 tablets (75 mg) by mouth daily    Benign essential hypertension       mometasone 0.1 % cream    ELOCON    45 g    Apply sparingly to affected area twice daily as needed.  Do not apply to face.    Allergic contact dermatitis, unspecified trigger       Multi-vitamin Tabs tablet   Generic drug:  multivitamin, therapeutic with minerals      1 TABLET DAILY        nitroGLYcerin 0.4 MG sublingual tablet    NITROSTAT    60 tablet    Place 1 tablet (0.4 mg) under the tongue every 5 minutes as needed up to 3 tablets per episode.    Coronary artery disease involving autologous artery coronary bypass graft without angina pectoris       omeprazole 40 MG capsule    priLOSEC    90 capsule    Take 1 capsule (40 mg) by mouth daily Take 30-60 minutes before a meal.    Gastroesophageal reflux disease without esophagitis       rosuvastatin 40 MG tablet    CRESTOR    90 tablet    Take 1 tablet (40 mg) by mouth daily Replaces Lipitor because her insurance approved Crestor for another year    Hyperlipidemia LDL goal <100       STOOL SOFTENER PO      None  Entered        traMADol 50 MG tablet    ULTRAM    180 tablet    Take 2 every 8 hours, maximum 6 tablet(s) per day    Chronic pain syndrome, AC (acromioclavicular) joint arthritis       vitamin D 1000 units capsule      1 CAPSULE DAILY

## 2018-09-10 ENCOUNTER — TRANSFERRED RECORDS (OUTPATIENT)
Dept: HEALTH INFORMATION MANAGEMENT | Facility: CLINIC | Age: 73
End: 2018-09-10

## 2018-10-24 DIAGNOSIS — N18.30 CKD (CHRONIC KIDNEY DISEASE) STAGE 3, GFR 30-59 ML/MIN (H): ICD-10-CM

## 2018-10-24 DIAGNOSIS — E11.21 TYPE 2 DIABETES MELLITUS WITH DIABETIC NEPHROPATHY, WITHOUT LONG-TERM CURRENT USE OF INSULIN (H): ICD-10-CM

## 2018-10-24 DIAGNOSIS — I10 BENIGN ESSENTIAL HYPERTENSION: ICD-10-CM

## 2018-10-24 RX ORDER — FUROSEMIDE 40 MG
TABLET ORAL
Qty: 90 TABLET | Refills: 0
Start: 2018-10-24

## 2018-10-24 RX ORDER — LISINOPRIL 20 MG/1
TABLET ORAL
Qty: 90 TABLET | Refills: 0
Start: 2018-10-24

## 2018-10-24 RX ORDER — METOPROLOL SUCCINATE 50 MG/1
TABLET, EXTENDED RELEASE ORAL
Qty: 135 TABLET | Refills: 0
Start: 2018-10-24

## 2018-10-24 NOTE — TELEPHONE ENCOUNTER
Spoke to pharmacy and confirmed duplicate request. Please disregard request for Metoprolol, Metformin, furosemide, rosuvastatin     lisinopril (PRINIVIL/ZESTRIL) 20 MG tablet 90 tablet 4 5/14/2018  No      Sig - Route: Take 1 tablet (20 mg) by mouth daily - Oral     Class: E-Prescribe     Order: [id number]     E-Prescribing Status: Receipt confirmed by pharmacy (5/14/2018  9:09 AM CDT)       Malia TRIPLETT CMA ([facility])
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
## 2018-11-14 ENCOUNTER — OFFICE VISIT (OUTPATIENT)
Dept: FAMILY MEDICINE | Facility: CLINIC | Age: 73
End: 2018-11-14
Payer: COMMERCIAL

## 2018-11-14 VITALS
RESPIRATION RATE: 18 BRPM | TEMPERATURE: 98.2 F | DIASTOLIC BLOOD PRESSURE: 60 MMHG | SYSTOLIC BLOOD PRESSURE: 122 MMHG | HEART RATE: 87 BPM | BODY MASS INDEX: 32.89 KG/M2 | WEIGHT: 217 LBS | OXYGEN SATURATION: 97 % | HEIGHT: 68 IN

## 2018-11-14 DIAGNOSIS — G89.4 CHRONIC PAIN SYNDROME: ICD-10-CM

## 2018-11-14 DIAGNOSIS — N18.30 CKD (CHRONIC KIDNEY DISEASE) STAGE 3, GFR 30-59 ML/MIN (H): ICD-10-CM

## 2018-11-14 DIAGNOSIS — I10 BENIGN ESSENTIAL HYPERTENSION: ICD-10-CM

## 2018-11-14 DIAGNOSIS — G24.3 SPASMODIC TORTICOLLIS: ICD-10-CM

## 2018-11-14 DIAGNOSIS — Z78.0 ASYMPTOMATIC POSTMENOPAUSAL STATUS: ICD-10-CM

## 2018-11-14 DIAGNOSIS — E11.21 TYPE 2 DIABETES MELLITUS WITH DIABETIC NEPHROPATHY, WITHOUT LONG-TERM CURRENT USE OF INSULIN (H): Primary | ICD-10-CM

## 2018-11-14 LAB
ANION GAP SERPL CALCULATED.3IONS-SCNC: 7 MMOL/L (ref 3–14)
BUN SERPL-MCNC: 20 MG/DL (ref 7–30)
CALCIUM SERPL-MCNC: 9.2 MG/DL (ref 8.5–10.1)
CHLORIDE SERPL-SCNC: 101 MMOL/L (ref 94–109)
CO2 SERPL-SCNC: 30 MMOL/L (ref 20–32)
CREAT SERPL-MCNC: 1.34 MG/DL (ref 0.52–1.04)
GFR SERPL CREATININE-BSD FRML MDRD: 39 ML/MIN/1.7M2
GLUCOSE SERPL-MCNC: 135 MG/DL (ref 70–99)
HBA1C MFR BLD: 7.1 % (ref 0–5.6)
POTASSIUM SERPL-SCNC: 4.3 MMOL/L (ref 3.4–5.3)
SODIUM SERPL-SCNC: 138 MMOL/L (ref 133–144)

## 2018-11-14 PROCEDURE — 99214 OFFICE O/P EST MOD 30 MIN: CPT | Performed by: FAMILY MEDICINE

## 2018-11-14 PROCEDURE — 80048 BASIC METABOLIC PNL TOTAL CA: CPT | Performed by: FAMILY MEDICINE

## 2018-11-14 PROCEDURE — 36415 COLL VENOUS BLD VENIPUNCTURE: CPT | Performed by: FAMILY MEDICINE

## 2018-11-14 PROCEDURE — 83036 HEMOGLOBIN GLYCOSYLATED A1C: CPT | Performed by: FAMILY MEDICINE

## 2018-11-14 RX ORDER — TRAMADOL HYDROCHLORIDE 50 MG/1
TABLET ORAL
Qty: 180 TABLET | Refills: 2 | Status: SHIPPED | OUTPATIENT
Start: 2019-01-13 | End: 2019-02-15

## 2018-11-14 RX ORDER — TRAMADOL HYDROCHLORIDE 50 MG/1
TABLET ORAL
Qty: 180 TABLET | Refills: 2 | Status: SHIPPED | OUTPATIENT
Start: 2018-11-14 | End: 2018-11-14

## 2018-11-14 RX ORDER — TRAMADOL HYDROCHLORIDE 50 MG/1
TABLET ORAL
Qty: 180 TABLET | Refills: 2 | Status: SHIPPED | OUTPATIENT
Start: 2018-12-14 | End: 2018-11-14

## 2018-11-14 ASSESSMENT — ANXIETY QUESTIONNAIRES
3. WORRYING TOO MUCH ABOUT DIFFERENT THINGS: SEVERAL DAYS
7. FEELING AFRAID AS IF SOMETHING AWFUL MIGHT HAPPEN: NOT AT ALL
5. BEING SO RESTLESS THAT IT IS HARD TO SIT STILL: NOT AT ALL
GAD7 TOTAL SCORE: 1
2. NOT BEING ABLE TO STOP OR CONTROL WORRYING: NOT AT ALL
1. FEELING NERVOUS, ANXIOUS, OR ON EDGE: NOT AT ALL
6. BECOMING EASILY ANNOYED OR IRRITABLE: NOT AT ALL
IF YOU CHECKED OFF ANY PROBLEMS ON THIS QUESTIONNAIRE, HOW DIFFICULT HAVE THESE PROBLEMS MADE IT FOR YOU TO DO YOUR WORK, TAKE CARE OF THINGS AT HOME, OR GET ALONG WITH OTHER PEOPLE: NOT DIFFICULT AT ALL

## 2018-11-14 ASSESSMENT — PATIENT HEALTH QUESTIONNAIRE - PHQ9
SUM OF ALL RESPONSES TO PHQ QUESTIONS 1-9: 2
5. POOR APPETITE OR OVEREATING: NOT AT ALL

## 2018-11-14 ASSESSMENT — PAIN SCALES - GENERAL: PAINLEVEL: NO PAIN (0)

## 2018-11-14 NOTE — MR AVS SNAPSHOT
After Visit Summary   11/14/2018    Anamaria Parra    MRN: [id number]           Patient Information     Date Of Birth          1945        Visit Information        Provider Department      11/14/2018 9:30 AM Cris Huber MD [facility]        Today's Diagnoses     Type 2 diabetes mellitus with diabetic nephropathy, without long-term current use of insulin (H)    -  1    Asymptomatic postmenopausal status        Chronic pain syndrome        CKD (chronic kidney disease) stage 3, GFR 30-59 ml/min (H)        Benign essential hypertension          Care Instructions    [facility]    If you have any questions regarding to your visit please contact your care team:       Team Purple:   Clinic Hours Telephone Number   Dr. Cris Vences   7am-7pm  Monday - Thursday   7am-5pm  Fridays  [phone]  (Appointment scheduling available 24/7)   Urgent Care - [facility] - 11am-9pm Monday-Friday Saturday-Sunday- 9am-5pm   [place] - 5pm-9pm Monday-Friday Saturday-Sunday- 9am-5pm  [phone] - [facility]  [phone] - [place]       What options do I have for a visit other than an office visit? We offer electronic visits (e-visits) and telephone visits, when medically appropriate.  Please check with your medical insurance to see if these types of visits are covered, as you will be responsible for any charges that are not paid by your insurance.      You can use [employer] (secure electronic communication) to access to your chart, send your primary care provider a message, or make an appointment. Ask a team member how to get started.     For a price quote for your services, please call our Consumer Price Line at [phone] or our Imaging Cost estimation line at [phone] (for imaging tests).              Follow-ups after your visit        Future tests that were ordered for you today     Open Future  "Orders        Priority Expected Expires Ordered    DEXA HIP/PELVIS/SPINE - Future Routine  11/12/2019 11/14/2018            Who to contact     If you have questions or need follow up information about today's clinic visit or your schedule please contact [facility] directly at [phone].  Normal or non-critical lab and imaging results will be communicated to you by MyChart, letter or phone within 4 business days after the clinic has received the results. If you do not hear from us within 7 days, please contact the clinic through MyChart or phone. If you have a critical or abnormal lab result, we will notify you by phone as soon as possible.  Submit refill requests through [employer]) or call your pharmacy and they will forward the refill request to us. Please allow 3 business days for your refill to be completed.          Additional Information About Your Visit        Care EveryWhere ID     This is your Care EveryWhere ID. This could be used by other organizations to access your [place] medical records  [id number]        Your Vitals Were     Pulse Temperature Respirations Height Pulse Oximetry BMI (Body Mass Index)    87 98.2  F (36.8  C) (Oral) 18 5' 8\" (1.727 m) 97% 32.99 kg/m2       Blood Pressure from Last 3 Encounters:   11/14/18 122/60   08/13/18 130/64   05/14/18 110/70    Weight from Last 3 Encounters:   11/14/18 217 lb (98.4 kg)   08/13/18 223 lb (101.2 kg)   05/14/18 224 lb (101.6 kg)              We Performed the Following     Basic metabolic panel     HEMOGLOBIN A1C          Where to get your medicines      Some of these will need a paper prescription and others can be bought over the counter.  Ask your nurse if you have questions.     Bring a paper prescription for each of these medications     traMADol 50 MG tablet         Information about OPIOIDS     PRESCRIPTION OPIOIDS: WHAT YOU NEED TO KNOW   We gave you an opioid (narcotic) pain medicine. It is important to manage your pain, but " opioids are not always the best choice. You should first try all the other options your care team gave you. Take this medicine for as short a time (and as few doses) as possible.    Some activities can increase your pain, such as bandage changes or therapy sessions. It may help to take your pain medicine 30 to 60 minutes before these activities. Reduce your stress by getting enough sleep, working on hobbies you enjoy and practicing relaxation or meditation. Talk to your care team about ways to manage your pain beyond prescription opioids.    These medicines have risks:    DO NOT drive when on new or higher doses of pain medicine. These medicines can affect your alertness and reaction times, and you could be arrested for driving under the influence (DUI). If you need to use opioids long-term, talk to your care team about driving.    DO NOT operate heavy machinery    DO NOT do any other dangerous activities while taking these medicines.    DO NOT drink any alcohol while taking these medicines.     If the opioid prescribed includes acetaminophen, DO NOT take with any other medicines that contain acetaminophen. Read all labels carefully. Look for the word  acetaminophen  or  Tylenol.  Ask your pharmacist if you have questions or are unsure.    You can get addicted to pain medicines, especially if you have a history of addiction (chemical, alcohol or substance dependence). Talk to your care team about ways to reduce this risk.    All opioids tend to cause constipation. Drink plenty of water and eat foods that have a lot of fiber, such as fruits, vegetables, prune juice, apple juice and high-fiber cereal. Take a laxative (Miralax, milk of magnesia, Colace, Senna) if you don t move your bowels at least every other day. Other side effects include upset stomach, sleepiness, dizziness, throwing up, tolerance (needing more of the medicine to have the same effect), physical dependence and slowed breathing.    Store your pills  in a secure place, locked if possible. We will not replace any lost or stolen medicine. If you don t finish your medicine, please throw away (dispose) as directed by your pharmacist. The [place] Pollution Control Agency has more information about safe disposal: https://[email or web address].[facility].[email or web address]/living-green/managing-unwanted-medications         Primary Care Provider Office Phone # Fax #    Cris Huber [phone] [address] [facility] [phone]        Equal Access to Services     [facility]: Hadii aad ku hadasho Soomaali, waaxda luqadaha, qaybta kaalmada adeegyada, waxdion porter . So [facility] [phone].    ATENCIÓN: Si habla español, tiene a keen disposición servicios gratuitos de asistencia lingüística. [facility] [phone].    We comply with applicable federal civil rights laws and [place] laws. We do not discriminate on the basis of race, color, national origin, age, disability, sex, sexual orientation, or gender identity.            Thank you!     Thank you for choosing [facility]  for your care. Our goal is always to provide you with excellent care. Hearing back from our patients is one way we can continue to improve our services. Please take a few minutes to complete the written survey that you may receive in the mail after your visit with us. Thank you!             Your Updated Medication List - Protect others around you: Learn how to safely use, store and throw away your medicines at www.disposemymeds.org.          This list is accurate as of 11/14/18  9:44 AM.  Always use your most recent med list.                   Brand Name Dispense Instructions for use Diagnosis    aspirin 81 MG tablet      Take 81 mg by mouth daily        B-12 PO      1000 MCG DAILY        clopidogrel 75 MG tablet    PLAVIX    90 tablet    Take 1 tablet (75 mg) by mouth daily    S/P coronary artery stent placement       fish oil-omega-3 fatty acids  1000 MG capsule      2 TABLETS DAILY        FOLIC ACID PO      3000 MCG DAILY        furosemide 40 MG tablet    LASIX    90 tablet    Take 1 tablet (40 mg) by mouth daily    CKD (chronic kidney disease) stage 3, GFR 30-59 ml/min (H)       glipiZIDE 2.5 MG 24 hr tablet    GLUCOTROL XL    90 tablet    Take 1 tablet (2.5 mg) by mouth daily (with breakfast)    Type 2 diabetes mellitus with diabetic nephropathy, without long-term current use of insulin (H)       lisinopril 20 MG tablet    PRINIVIL/ZESTRIL    90 tablet    Take 1 tablet (20 mg) by mouth daily    Benign essential hypertension       metFORMIN 500 MG tablet    GLUCOPHAGE    360 tablet    Take 2 tablets (1,000 mg) by mouth 2 times daily (with meals)    Type 2 diabetes mellitus with diabetic nephropathy, without long-term current use of insulin (H)       metoprolol succinate 50 MG 24 hr tablet    TOPROL-XL    135 tablet    Take 1.5 tablets (75 mg) by mouth daily    Benign essential hypertension       mometasone 0.1 % cream    ELOCON    45 g    Apply sparingly to affected area twice daily as needed.  Do not apply to face.    Allergic contact dermatitis, unspecified trigger       Multi-vitamin Tabs tablet   Generic drug:  multivitamin, therapeutic with minerals      1 TABLET DAILY        nitroGLYcerin 0.4 MG sublingual tablet    NITROSTAT    60 tablet    Place 1 tablet (0.4 mg) under the tongue every 5 minutes as needed up to 3 tablets per episode.    Coronary artery disease involving autologous artery coronary bypass graft without angina pectoris       omeprazole 40 MG capsule    priLOSEC    90 capsule    Take 1 capsule (40 mg) by mouth daily Take 30-60 minutes before a meal.    Gastroesophageal reflux disease without esophagitis       rosuvastatin 40 MG tablet    CRESTOR    90 tablet    Take 1 tablet (40 mg) by mouth daily Replaces Lipitor because her insurance approved Crestor for another year    Hyperlipidemia LDL goal <100       STOOL SOFTENER PO      None  Entered        traMADol 50 MG tablet    ULTRAM    180 tablet    Take 2 every 8 hours, maximum 6 tablet(s) per day    Chronic pain syndrome       vitamin D 1000 units capsule      1 CAPSULE DAILY

## 2018-11-14 NOTE — LETTER
[address] [place] [address]    November 14, 2018    Anamaria Parra  [address] [place] [address]          Dear Anamaria,    The rest of your blood tests are looking good. Continue your healthy lifestyle    Enclosed is a copy of your results.     Results for orders placed or performed in visit on 11/14/18   HEMOGLOBIN A1C   Result Value Ref Range    Hemoglobin A1C 7.1 (H) 0 - 5.6 %   Basic metabolic panel   Result Value Ref Range    Sodium 138 133 - 144 mmol/L    Potassium 4.3 3.4 - 5.3 mmol/L    Chloride 101 94 - 109 mmol/L    Carbon Dioxide 30 20 - 32 mmol/L    Anion Gap 7 3 - 14 mmol/L    Glucose 135 (H) 70 - 99 mg/dL    Urea Nitrogen 20 7 - 30 mg/dL    Creatinine 1.34 (H) 0.52 - 1.04 mg/dL    GFR Estimate 39 (L) >60 mL/min/1.7m2    GFR Estimate If Black 47 (L) >60 mL/min/1.7m2    Calcium 9.2 8.5 - 10.1 mg/dL       If you have any questions or concerns, please call myself or my nurse at [phone].      Sincerely,        Cris Huber MD/[facility]

## 2018-11-15 ASSESSMENT — ANXIETY QUESTIONNAIRES: GAD7 TOTAL SCORE: 1

## 2018-11-25 DIAGNOSIS — I25.810 CORONARY ARTERY DISEASE INVOLVING AUTOLOGOUS ARTERY CORONARY BYPASS GRAFT WITHOUT ANGINA PECTORIS: ICD-10-CM

## 2018-11-25 DIAGNOSIS — K21.9 GASTROESOPHAGEAL REFLUX DISEASE WITHOUT ESOPHAGITIS: ICD-10-CM

## 2018-11-27 RX ORDER — NITROGLYCERIN 0.4 MG/1
TABLET SUBLINGUAL
Qty: 50 TABLET | Refills: 0 | OUTPATIENT
Start: 2018-11-27

## 2018-11-27 RX ORDER — OMEPRAZOLE 40 MG/1
CAPSULE, DELAYED RELEASE ORAL
Qty: 90 CAPSULE | Refills: 0 | Status: SHIPPED | OUTPATIENT
Start: 2018-11-27 | End: 2019-02-15

## 2018-11-27 NOTE — TELEPHONE ENCOUNTER
Prescription approved per [facility] Refill Protocol.  Jeanine Villalobos RN

## 2018-11-27 NOTE — TELEPHONE ENCOUNTER
Called pharmacy and only nitroGLYcerin (NITROSTAT) 0.4 MG sublingual tablet is a duplicate. omeprazole (PRILOSEC) 40 MG capsule needs to be refill  Alek Cat CMA on 11/27/2018 at 12:39 PM

## 2019-02-04 ENCOUNTER — TELEPHONE (OUTPATIENT)
Dept: FAMILY MEDICINE | Facility: CLINIC | Age: 74
End: 2019-02-04

## 2019-02-04 NOTE — TELEPHONE ENCOUNTER
Panel Management Review      Patient has the following on her problem list:     Hypertension   Last three blood pressure readings:  BP Readings from Last 3 Encounters:   11/14/18 122/60   08/13/18 130/64   05/14/18 110/70     Blood pressure: Passed    HTN Guidelines:  Age 18-59 BP range:  Less than 140/90  Age 60-85 with Diabetes:  Less than 140/90  Age 60-85 without Diabetes:  less than 150/90      Composite cancer screening  Chart review shows that this patient is due/due soon for the following Mammogram  Summary:    Patient is due/failing the following:   MAMMOGRAM    Action needed:   Patient needs referral/order: Mammograph    Type of outreach:    Sent letter.    Questions for provider review:    None                                                                                                                                    NAMAN Gonzales       Chart routed to none .

## 2019-02-04 NOTE — LETTER
February 4, 2019          Anamaria Parra,  [address] [address]        Dear Anamaria Parra      Monitoring and managing your preventative and chronic health conditions are very important to us. Our records indicate that you have not scheduled for Mammogram  which was recommended by Dr. Mejias.      If you have received your health care elsewhere, please call the clinic so the information can be documented in your chart.    Please call [phone] or message us through your [employer] account to schedule an appointment or provide information for your chart.     Feel free to contact us if you have any questions or concerns!    I look forward to seeing you and working with you on your health care needs.     Sincerely,       [facility]/cc

## 2019-02-15 ENCOUNTER — OFFICE VISIT (OUTPATIENT)
Dept: FAMILY MEDICINE | Facility: CLINIC | Age: 74
End: 2019-02-15
Payer: COMMERCIAL

## 2019-02-15 VITALS
TEMPERATURE: 98.4 F | HEART RATE: 115 BPM | SYSTOLIC BLOOD PRESSURE: 150 MMHG | BODY MASS INDEX: 33.94 KG/M2 | WEIGHT: 223.2 LBS | OXYGEN SATURATION: 95 % | DIASTOLIC BLOOD PRESSURE: 74 MMHG

## 2019-02-15 DIAGNOSIS — E11.65 TYPE 2 DIABETES MELLITUS WITH HYPERGLYCEMIA, WITHOUT LONG-TERM CURRENT USE OF INSULIN (H): Primary | ICD-10-CM

## 2019-02-15 DIAGNOSIS — I10 HYPERTENSION GOAL BP (BLOOD PRESSURE) < 140/90: ICD-10-CM

## 2019-02-15 DIAGNOSIS — G89.4 CHRONIC PAIN SYNDROME: ICD-10-CM

## 2019-02-15 DIAGNOSIS — K21.9 GASTROESOPHAGEAL REFLUX DISEASE WITHOUT ESOPHAGITIS: ICD-10-CM

## 2019-02-15 DIAGNOSIS — E66.01 MORBID OBESITY DUE TO EXCESS CALORIES (H): ICD-10-CM

## 2019-02-15 LAB — HBA1C MFR BLD: 8.2 % (ref 0–5.6)

## 2019-02-15 PROCEDURE — 36415 COLL VENOUS BLD VENIPUNCTURE: CPT | Performed by: FAMILY MEDICINE

## 2019-02-15 PROCEDURE — 99214 OFFICE O/P EST MOD 30 MIN: CPT | Performed by: FAMILY MEDICINE

## 2019-02-15 PROCEDURE — 83036 HEMOGLOBIN GLYCOSYLATED A1C: CPT | Performed by: FAMILY MEDICINE

## 2019-02-15 RX ORDER — GLIPIZIDE 5 MG/1
5 TABLET, FILM COATED, EXTENDED RELEASE ORAL
Qty: 90 TABLET | Refills: 3 | Status: SHIPPED | OUTPATIENT
Start: 2019-02-15 | End: 2020-02-13

## 2019-02-15 RX ORDER — OMEPRAZOLE 40 MG/1
CAPSULE, DELAYED RELEASE ORAL
Qty: 90 CAPSULE | Refills: 1 | Status: SHIPPED | OUTPATIENT
Start: 2019-02-15 | End: 2019-08-11

## 2019-02-15 RX ORDER — TRAMADOL HYDROCHLORIDE 50 MG/1
TABLET ORAL
Qty: 180 TABLET | Refills: 2 | Status: SHIPPED | OUTPATIENT
Start: 2019-02-15 | End: 2019-05-21
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
## 2019-02-25 ENCOUNTER — TELEPHONE (OUTPATIENT)
Dept: FAMILY MEDICINE | Facility: CLINIC | Age: 74
End: 2019-02-25

## 2019-02-25 NOTE — TELEPHONE ENCOUNTER
Panel Management Review      Patient has the following on her problem list:     Diabetes    ASA: PassedPassed    Last A1C  Lab Results   Component Value Date    A1C 8.2 02/15/2019    A1C 7.1 11/14/2018    A1C 7.2 08/13/2018    A1C 7.5 05/14/2018    A1C 7.0 02/14/2018     A1C tested: MONITORMONITOR    Last LDL:    Lab Results   Component Value Date    CHOL 134 08/13/2018     Lab Results   Component Value Date    HDL 43 08/13/2018     Lab Results   Component Value Date    LDL 61 08/13/2018     Lab Results   Component Value Date    TRIG 149 08/13/2018     Lab Results   Component Value Date    CHOLHDLRATIO 2.7 06/23/2015     Lab Results   Component Value Date    NHDL 91 08/13/2018       Is the patient on a Statin? YES             Is the patient on Aspirin? YES    Medications     HMG CoA Reductase Inhibitors     rosuvastatin (CRESTOR) 40 MG tablet       Salicylates     aspirin 81 MG tablet             Last three blood pressure readings:  BP Readings from Last 3 Encounters:   02/15/19 (P) 134/84   11/14/18 122/60   08/13/18 130/64       Date of last diabetes office visit: DUE   Tobacco History:     History   Smoking Status     Former Smoker     Years: 30.00     Types: Cigarettes     Start date: 1/1/1960     Quit date: 1/1/1990   Smokeless Tobacco     Never Used         Hypertension   Last three blood pressure readings:  BP Readings from Last 3 Encounters:   02/15/19 (P) 134/84   11/14/18 122/60   08/13/18 130/64     Blood pressure: Passed    HTN Guidelines:  Age 18-59 BP range:  Less than 140/90  Age 60-85 with Diabetes:  Less than 140/90  Age 60-85 without Diabetes:  less than 150/90      Composite cancer screening  Chart review shows that this patient is due/due soon for the following MammogramMammogram  Summary:    Patient is due/failing the following:   MAMMOGRAM    Action needed:   Patient needs referral/order: MAMMO    Type of outreach:    Sent [facility] message.    Questions for provider review:    None                                                                                                                                     Alek Cat CMA on 2/25/2019 at 8:25 AM       Chart routed to NONE .

## 2019-05-21 ENCOUNTER — OFFICE VISIT (OUTPATIENT)
Dept: FAMILY MEDICINE | Facility: CLINIC | Age: 74
End: 2019-05-21
Payer: COMMERCIAL

## 2019-05-21 VITALS
HEART RATE: 121 BPM | DIASTOLIC BLOOD PRESSURE: 82 MMHG | SYSTOLIC BLOOD PRESSURE: 138 MMHG | WEIGHT: 222.4 LBS | OXYGEN SATURATION: 97 % | BODY MASS INDEX: 33.71 KG/M2 | TEMPERATURE: 98.5 F | HEIGHT: 68 IN | RESPIRATION RATE: 16 BRPM

## 2019-05-21 DIAGNOSIS — I73.9 PAD (PERIPHERAL ARTERY DISEASE) (H): ICD-10-CM

## 2019-05-21 DIAGNOSIS — E11.65 TYPE 2 DIABETES MELLITUS WITH HYPERGLYCEMIA, WITHOUT LONG-TERM CURRENT USE OF INSULIN (H): Primary | ICD-10-CM

## 2019-05-21 DIAGNOSIS — F10.21 ALCOHOL DEPENDENCE IN REMISSION (H): ICD-10-CM

## 2019-05-21 DIAGNOSIS — L97.921 ULCER OF LOWER LIMB, LEFT, LIMITED TO BREAKDOWN OF SKIN (H): ICD-10-CM

## 2019-05-21 DIAGNOSIS — G89.4 CHRONIC PAIN SYNDROME: ICD-10-CM

## 2019-05-21 LAB
ALBUMIN SERPL-MCNC: 4.1 G/DL (ref 3.4–5)
ALP SERPL-CCNC: 82 U/L (ref 40–150)
ALT SERPL W P-5'-P-CCNC: 31 U/L (ref 0–50)
ANION GAP SERPL CALCULATED.3IONS-SCNC: 11 MMOL/L (ref 3–14)
AST SERPL W P-5'-P-CCNC: 16 U/L (ref 0–45)
BILIRUB SERPL-MCNC: 0.5 MG/DL (ref 0.2–1.3)
BUN SERPL-MCNC: 33 MG/DL (ref 7–30)
CALCIUM SERPL-MCNC: 9.7 MG/DL (ref 8.5–10.1)
CHLORIDE SERPL-SCNC: 102 MMOL/L (ref 94–109)
CO2 SERPL-SCNC: 23 MMOL/L (ref 20–32)
CREAT SERPL-MCNC: 1.38 MG/DL (ref 0.52–1.04)
GFR SERPL CREATININE-BSD FRML MDRD: 37 ML/MIN/{1.73_M2}
GLUCOSE SERPL-MCNC: 166 MG/DL (ref 70–99)
POTASSIUM SERPL-SCNC: 4.3 MMOL/L (ref 3.4–5.3)
PROT SERPL-MCNC: 7.7 G/DL (ref 6.8–8.8)
SODIUM SERPL-SCNC: 136 MMOL/L (ref 133–144)
TSH SERPL DL<=0.005 MIU/L-ACNC: 0.82 MU/L (ref 0.4–4)

## 2019-05-21 PROCEDURE — 84443 ASSAY THYROID STIM HORMONE: CPT | Performed by: FAMILY MEDICINE

## 2019-05-21 PROCEDURE — 82043 UR ALBUMIN QUANTITATIVE: CPT | Performed by: FAMILY MEDICINE

## 2019-05-21 PROCEDURE — 99207 C FOOT EXAM  NO CHARGE: CPT | Performed by: FAMILY MEDICINE

## 2019-05-21 PROCEDURE — 36415 COLL VENOUS BLD VENIPUNCTURE: CPT | Performed by: FAMILY MEDICINE

## 2019-05-21 PROCEDURE — 99214 OFFICE O/P EST MOD 30 MIN: CPT | Performed by: FAMILY MEDICINE

## 2019-05-21 PROCEDURE — 80053 COMPREHEN METABOLIC PANEL: CPT | Performed by: FAMILY MEDICINE

## 2019-05-21 RX ORDER — TRAMADOL HYDROCHLORIDE 50 MG/1
TABLET ORAL
Qty: 180 TABLET | Refills: 2 | Status: SHIPPED | OUTPATIENT
Start: 2019-05-21 | End: 2019-08-21

## 2019-05-21 ASSESSMENT — MIFFLIN-ST. JEOR: SCORE: 1552.3

## 2019-05-21 NOTE — LETTER
May 24, 2019      Anamaria Parra  [address] [place] [address]        Dear ,    We are writing to inform you of your test results.    Your recent labs looked good.   Your recent urine microalbumin test was normal. No evidence of a blood protein (albumin) in your urine at this time. Will repeat this urine test in a year.     Thyroid function test was normal.     Kidney function remains stable. Electrolytes and liver function were normal.     Resulted Orders   Comprehensive metabolic panel   Result Value Ref Range    Sodium 136 133 - 144 mmol/L    Potassium 4.3 3.4 - 5.3 mmol/L    Chloride 102 94 - 109 mmol/L    Carbon Dioxide 23 20 - 32 mmol/L    Anion Gap 11 3 - 14 mmol/L    Glucose 166 (H) 70 - 99 mg/dL      Comment:      Non Fasting    Urea Nitrogen 33 (H) 7 - 30 mg/dL    Creatinine 1.38 (H) 0.52 - 1.04 mg/dL    GFR Estimate 37 (L) >60 mL/min/[1.73_m2]      Comment:      Non  GFR Calc  Starting 12/18/2018, serum creatinine based estimated GFR (eGFR) will be   calculated using the Chronic Kidney Disease Epidemiology Collaboration   (CKD-EPI) equation.      GFR Estimate If Black 43 (L) >60 mL/min/[1.73_m2]      Comment:       GFR Calc  Starting 12/18/2018, serum creatinine based estimated GFR (eGFR) will be   calculated using the Chronic Kidney Disease Epidemiology Collaboration   (CKD-EPI) equation.      Calcium 9.7 8.5 - 10.1 mg/dL    Bilirubin Total 0.5 0.2 - 1.3 mg/dL    Albumin 4.1 3.4 - 5.0 g/dL    Protein Total 7.7 6.8 - 8.8 g/dL    Alkaline Phosphatase 82 40 - 150 U/L    ALT 31 0 - 50 U/L    AST 16 0 - 45 U/L   Albumin Random Urine Quantitative with Creat Ratio   Result Value Ref Range    Creatinine Urine 108 mg/dL    Albumin Urine mg/L 16 mg/L    Albumin Urine mg/g Cr 15.09 0 - 25 mg/g Cr   TSH with free T4 reflex   Result Value Ref Range    TSH 0.82 0.40 - 4.00 mU/L       If you have any questions or concerns, please call the clinic at the number  listed above.       Sincerely,        Margaret Chandra MD/kriso

## 2019-05-21 NOTE — PATIENT INSTRUCTIONS
Check with your insurance if the Shingles vaccine (Shingrix) is covered.   Call if they cover MMR titre's to ensure that you're immune against measles, mumps and rubella.
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
## 2019-05-22 LAB
CREAT UR-MCNC: 108 MG/DL
MICROALBUMIN UR-MCNC: 16 MG/L
MICROALBUMIN/CREAT UR: 15.09 MG/G CR (ref 0–25)

## 2019-07-02 ENCOUNTER — OFFICE VISIT (OUTPATIENT)
Dept: FAMILY MEDICINE | Facility: CLINIC | Age: 74
End: 2019-07-02
Payer: COMMERCIAL

## 2019-07-02 VITALS
WEIGHT: 222.4 LBS | BODY MASS INDEX: 33.71 KG/M2 | OXYGEN SATURATION: 98 % | RESPIRATION RATE: 18 BRPM | TEMPERATURE: 98.4 F | DIASTOLIC BLOOD PRESSURE: 75 MMHG | SYSTOLIC BLOOD PRESSURE: 125 MMHG | HEIGHT: 68 IN | HEART RATE: 92 BPM

## 2019-07-02 DIAGNOSIS — Z00.00 MEDICARE ANNUAL WELLNESS VISIT, SUBSEQUENT: Primary | ICD-10-CM

## 2019-07-02 DIAGNOSIS — E78.5 HYPERLIPIDEMIA LDL GOAL <100: ICD-10-CM

## 2019-07-02 DIAGNOSIS — Z01.84 IMMUNITY STATUS TESTING: ICD-10-CM

## 2019-07-02 DIAGNOSIS — N18.30 CKD (CHRONIC KIDNEY DISEASE) STAGE 3, GFR 30-59 ML/MIN (H): ICD-10-CM

## 2019-07-02 DIAGNOSIS — I10 HYPERTENSION GOAL BP (BLOOD PRESSURE) < 140/90: ICD-10-CM

## 2019-07-02 DIAGNOSIS — E11.65 TYPE 2 DIABETES MELLITUS WITH HYPERGLYCEMIA, WITHOUT LONG-TERM CURRENT USE OF INSULIN (H): ICD-10-CM

## 2019-07-02 DIAGNOSIS — Z23 NEED FOR VACCINATION: ICD-10-CM

## 2019-07-02 LAB
CREAT SERPL-MCNC: 1.36 MG/DL (ref 0.52–1.04)
GFR SERPL CREATININE-BSD FRML MDRD: 38 ML/MIN/{1.73_M2}
HBA1C MFR BLD: 7.4 % (ref 0–5.6)
MEV IGG SER QL IA: >8 AI (ref 0–0.8)
MUV IGG SER QL IA: 7.3 AI (ref 0–0.8)
RUBV IGG SERPL IA-ACNC: 5 IU/ML

## 2019-07-02 PROCEDURE — 82565 ASSAY OF CREATININE: CPT | Performed by: FAMILY MEDICINE

## 2019-07-02 PROCEDURE — 86765 RUBEOLA ANTIBODY: CPT | Performed by: FAMILY MEDICINE

## 2019-07-02 PROCEDURE — G0439 PPPS, SUBSEQ VISIT: HCPCS | Performed by: FAMILY MEDICINE

## 2019-07-02 PROCEDURE — 36415 COLL VENOUS BLD VENIPUNCTURE: CPT | Performed by: FAMILY MEDICINE

## 2019-07-02 PROCEDURE — 90471 IMMUNIZATION ADMIN: CPT | Performed by: FAMILY MEDICINE

## 2019-07-02 PROCEDURE — 86762 RUBELLA ANTIBODY: CPT | Performed by: FAMILY MEDICINE

## 2019-07-02 PROCEDURE — 90750 HZV VACC RECOMBINANT IM: CPT | Performed by: FAMILY MEDICINE

## 2019-07-02 PROCEDURE — 99214 OFFICE O/P EST MOD 30 MIN: CPT | Mod: 25 | Performed by: FAMILY MEDICINE

## 2019-07-02 PROCEDURE — 86735 MUMPS ANTIBODY: CPT | Performed by: FAMILY MEDICINE

## 2019-07-02 PROCEDURE — 83036 HEMOGLOBIN GLYCOSYLATED A1C: CPT | Performed by: FAMILY MEDICINE

## 2019-07-02 RX ORDER — ROSUVASTATIN CALCIUM 40 MG/1
40 TABLET, COATED ORAL DAILY
Qty: 90 TABLET | Refills: 3 | Status: SHIPPED | OUTPATIENT
Start: 2019-07-02 | End: 2020-06-26

## 2019-07-02 RX ORDER — METOPROLOL SUCCINATE 50 MG/1
75 TABLET, EXTENDED RELEASE ORAL DAILY
Qty: 135 TABLET | Refills: 5 | Status: SHIPPED | OUTPATIENT
Start: 2019-07-02 | End: 2020-07-22

## 2019-07-02 RX ORDER — LISINOPRIL 20 MG/1
20 TABLET ORAL DAILY
Qty: 90 TABLET | Refills: 3 | Status: SHIPPED | OUTPATIENT
Start: 2019-07-02 | End: 2020-06-26

## 2019-07-02 ASSESSMENT — MIFFLIN-ST. JEOR: SCORE: 1547.3

## 2019-07-02 ASSESSMENT — ACTIVITIES OF DAILY LIVING (ADL): CURRENT_FUNCTION: NO ASSISTANCE NEEDED

## 2019-07-02 NOTE — PROGRESS NOTES
"SUBJECTIVE:   Anamaria Parra is a 74 year old female who presents for Preventive Visit.    Are you in the first 12 months of your Medicare coverage?  No    Healthy Habits:    In general, how would you rate your overall health?  Fair    Frequency of exercise:  6-7 days/week    Duration of exercise:  Less than 15 minutes    Do you usually eat at least 4 servings of fruit and vegetables a day, include whole grains    & fiber and avoid regularly eating high fat or \"junk\" foods?  Yes    Taking medications regularly:  No    Barriers to taking medications:  Not applicable    Medication side effects:  Not applicable    Ability to successfully perform activities of daily living:  No assistance needed    Home Safety:  Throw rugs in the hallway    Hearing Impairment:  No hearing concerns    In the past 6 months, have you been bothered by leaking of urine?  No    In general, how would you rate your overall mental or emotional health?  Good      PHQ-2 Total Score:    Additional concerns today:  No      DIABETES - Patient has a longstanding history of DiabetesType Type II . Patient is being treated with oral agents and denies significant side effects. Control has been good. Complicating factors include but are not limited to: hypertension, hyperlipidemia, chronic kidney disease and morbid obesity .     HYPERLIPIDEMIA - Patient has a long history of significant Hyperlipidemia requiring medication for treatment with recent good control. Patient reports no problems or side effects with the medication.   Last lipid panel  Recent Labs   Lab Test 08/13/18  0854 08/03/17  0958  06/23/15  1049 05/22/14  0912   CHOL 134 121   < > 139 137   HDL 43* 46*   < > 51 44*   LDL 61 43   < > 67 75   TRIG 149 159*   < > 105 92   CHOLHDLRATIO  --   --   --  2.7 3.1    < > = values in this interval not displayed.         HYPERTENSION - Patient has longstanding history of HTN , currently denies any symptoms referable to elevated blood pressure. " Specifically denies chest pain, palpitations, dyspnea, orthopnea, PND or peripheral edema. Blood pressure readings have been in normal range. Current medication regimen is as listed below. Patient denies any side effects of medication.     RENAL INSUFFICIENCY - Patient has a longstanding history of moderate-severe chronic renal insufficiency. Last Cr   Creatinine   Date Value Ref Range Status   2019 1.38 (H) 0.52 - 1.04 mg/dL Final         Requesting MMR titer be completed along with today's blood work.     Patient informed that anything we discuss that is not related to preventative medicine, may be billed for; patient verbalizes understanding.      Do you feel safe in your environment? Yes    Do you have a Health Care Directive? Yes: Advance Directive has been received and scanned.      Fall risk  Fallen 2 or more times in the past year?: No  Any fall with injury in the past year?: No    Cognitive Screening   1) Repeat 3 items (Leader, Season, Table)    2) Clock draw: NORMAL  3) 3 item recall: Recalls 2 objects   Results: NORMAL clock, 1-2 items recalled: COGNITIVE IMPAIRMENT LESS LIKELY    Mini-CogTM Copyright VALERIE Leon. Licensed by the author for use in [facility]; reprinted with permission (tyler@[facility]). All rights reserved.      Do you have sleep apnea, excessive snoring or daytime drowsiness?: yes    Reviewed and updated as needed this visit by clinical staff  Tobacco         Reviewed and updated as needed this visit by Provider        Social History     Tobacco Use     Smoking status: Former Smoker     Years: 30.00     Types: Cigarettes     Start date: 1960     Last attempt to quit: 1990     Years since quittin.5     Smokeless tobacco: Never Used   Substance Use Topics     Alcohol use: No     Alcohol/week: 0.0 oz     Comment: history of abuse      If you drink alcohol do you typically have >3 drinks per day or >7 drinks per week? No    No flowsheet data  found.            Current providers sharing in care for this patient include:   Patient Care Team:  Margaret Chandra MD as PCP - General (Family Practice)  Margaret Chandra MD as Assigned PCP    The following health maintenance items are reviewed in Epic and correct as of today:  Health Maintenance   Topic Date Due     DEXA  1945     URINE DRUG SCREEN  1945     EYE EXAM  1945     ZOSTER IMMUNIZATION (2 of 3) 02/07/2007     MEDICARE ANNUAL WELLNESS VISIT  06/03/2008     A1C  05/15/2019     LIPID  08/13/2019     FALL RISK ASSESSMENT  08/13/2019     INFLUENZA VACCINE (1) 09/01/2019     HINA ASSESSMENT  11/14/2019     PHQ-9  11/14/2019     BMP  05/21/2020     MICROALBUMIN  05/21/2020     TSH W/FREE T4 REFLEX  05/21/2020     DIABETIC FOOT EXAM  05/21/2020     COLONOSCOPY  08/02/2020     ADVANCE CARE PLANNING  10/23/2020     DTAP/TDAP/TD IMMUNIZATION (5 - Td) 07/26/2021     PNEUMOCOCCAL IMMUNIZATION 65+ LOW/MEDIUM RISK  Completed     IPV IMMUNIZATION  Aged Out     MENINGITIS IMMUNIZATION  Aged Out     Lab work is in process  Labs reviewed in EPIC  BP Readings from Last 3 Encounters:   07/02/19 125/75   05/21/19 138/82   02/15/19 (P) 134/84    Wt Readings from Last 3 Encounters:   07/02/19 100.9 kg (222 lb 6.4 oz)   05/21/19 100.9 kg (222 lb 6.4 oz)   02/15/19 101.2 kg (223 lb 3.2 oz)                  Patient Active Problem List   Diagnosis     Onychomycosis due to dermatophyte     Psoriasis     PAD (peripheral artery disease) (H)     Carotid stenosis     Spasmodic torticollis     CKD (chronic kidney disease) stage 3, GFR 30-59 ml/min (H)     Hyperthyroidism     Hyperlipidemia LDL goal <100     Peripheral neuropathy     ACP (advance care planning)     Partial tear of rotator cuff     AC (acromioclavicular) joint arthritis - right     Shoulder impingement - right     Osteoarthritis of shoulder - right     Ovarian cyst     Morbid obesity due to excess calories (H)     Chronic pain syndrome     Benign  essential hypertension     Coronary artery disease involving autologous artery coronary bypass graft without angina pectoris     Type 2 diabetes mellitus with diabetic nephropathy, without long-term current use of insulin (H)     Gastroesophageal reflux disease without esophagitis     S/P coronary artery stent placement     Alcohol dependence in remission (H)     Past Surgical History:   Procedure Laterality Date     ANGIOGRAM  02    with 2 stents     ANGIOGRAM  04     ANGIOGRAM  2017, 2 stents in RCA    at [facility] ANESTH,SURGERY OF SHOULDER  07    left shoulder arthroplasty     C HAND/FINGER SURGERY UNLISTED      right thumb deep lac repair     CLOSED RX CLAVICLE FRACTURE       CORONARY ARTERY BYPASS  09     TONSILLECTOMY & ADENOIDECTOMY         Social History     Tobacco Use     Smoking status: Former Smoker     Years: 30.00     Types: Cigarettes     Start date: 1960     Last attempt to quit: 1990     Years since quittin.5     Smokeless tobacco: Never Used   Substance Use Topics     Alcohol use: No     Alcohol/week: 0.0 oz     Comment: history of abuse      Family History   Problem Relation Age of Onset     Hypertension Mother      Cerebrovascular Disease Mother      Cardiovascular Father      C.A.D. Brother      Diabetes Brother      Hypertension Brother      Cardiovascular Brother          Current Outpatient Medications   Medication Sig Dispense Refill     lisinopril (PRINIVIL/ZESTRIL) 20 MG tablet Take 1 tablet (20 mg) by mouth daily 90 tablet 3     metFORMIN (GLUCOPHAGE) 500 MG tablet Take 2 tablets (1,000 mg) by mouth 2 times daily (with meals) 360 tablet 3     metoprolol succinate ER (TOPROL-XL) 50 MG 24 hr tablet Take 1.5 tablets (75 mg) by mouth daily 135 tablet 5     rosuvastatin (CRESTOR) 40 MG tablet Take 1 tablet (40 mg) by mouth daily Replaces Lipitor because her insurance approved Crestor for another year 90 tablet 3     aspirin 81 MG tablet Take 81 mg by mouth  daily       B-12 OR 1000 MCG DAILY       FISH OIL 1000 MG OR CAPS 2 TABLETS DAILY       FOLIC ACID OR 3000 MCG DAILY       furosemide (LASIX) 40 MG tablet Take 1 tablet (40 mg) by mouth daily 90 tablet 4     glipiZIDE (GLUCOTROL XL) 5 MG 24 hr tablet Take 1 tablet (5 mg) by mouth daily (with breakfast) 90 tablet 3     MULTI-VITAMIN OR TABS 1 TABLET DAILY       nitroGLYcerin (NITROSTAT) 0.4 MG sublingual tablet Place 1 tablet (0.4 mg) under the tongue every 5 minutes as needed up to 3 tablets per episode. 60 tablet 12     omeprazole (PRILOSEC) 40 MG DR capsule TAKE 1 CAPSULE(40 MG) BY MOUTH ONCE DAILY 30 TO 60 MINUTES BEFORE A MEAL 90 capsule 1     STOOL SOFTENER OR None Entered       traMADol (ULTRAM) 50 MG tablet Take 2 every 8 hours, maximum 6 tablet(s) per day 180 tablet 2     VITAMIN D 1000 UNIT OR CAPS 1 CAPSULE DAILY       Allergies   Allergen Reactions     Dilantin [Phenytoin]      rash     Recent Labs   Lab Test 07/02/19  0840 05/21/19  1117 02/15/19  0821 11/14/18  0909 08/13/18  0854 05/14/18  0834  11/03/17  0916 08/03/17  0958  08/02/16  0846   A1C 7.4*  --  8.2* 7.1* 7.2* 7.5*   < > 8.1*  --    < >  --    LDL  --   --   --   --  61  --   --   --  43  --  57   HDL  --   --   --   --  43*  --   --   --  46*  --  42*   TRIG  --   --   --   --  149  --   --   --  159*  --  141   ALT  --  31  --   --   --  29  --  29 32  --   --    CR  --  1.38*  --  1.34*  --  1.33*   < > 1.60* 1.39*   < > 1.80*   GFRESTIMATED  --  37*  --  39*  --  39*   < > 31* 37*   < > 28*   GFRESTBLACK  --  43*  --  47*  --  47*   < > 38* 45*   < > 33*   POTASSIUM  --  4.3  --  4.3  --  4.3   < > 4.4 4.5   < > 4.7   TSH  --  0.82  --   --  0.80  --   --   --  0.62  --  0.50    < > = values in this interval not displayed.          Review of Systems  Constitutional, HEENT, cardiovascular, pulmonary, gi and gu systems are negative, except as otherwise noted.    OBJECTIVE:   /75   Pulse 92   Temp 98.4  F (36.9  C) (Tympanic)    "Resp 18   Ht 1.727 m (5' 8\")   Wt 100.9 kg (222 lb 6.4 oz)   SpO2 98%   Breastfeeding? No   BMI 33.82 kg/m   Estimated body mass index is 33.82 kg/m  as calculated from the following:    Height as of this encounter: 1.727 m (5' 8\").    Weight as of this encounter: 100.9 kg (222 lb 6.4 oz).  Physical Exam  GENERAL: healthy, alert and no distress  NECK: no adenopathy, no asymmetry, masses, or scars and thyroid normal to palpation  RESP: lungs clear to auscultation - no rales, rhonchi or wheezes  CV: regular rate and rhythm, normal S1 S2, no S3 or S4, no murmur, click or rub, no peripheral edema and peripheral pulses strong  ABDOMEN: soft, nontender, no hepatosplenomegaly, no masses and bowel sounds normal  MS: no gross musculoskeletal defects noted, no edema    Diagnostic Test Results:  Reviewed and discussed with patient prior to discharge.  Results for orders placed or performed in visit on 07/02/19   Hemoglobin A1c   Result Value Ref Range    Hemoglobin A1C 7.4 (H) 0 - 5.6 %         ASSESSMENT / PLAN:   Anamaria was seen today for annual visit.    Diagnoses and all orders for this visit:    Medicare annual wellness visit, subsequent    Type 2 diabetes mellitus with hyperglycemia, without long-term current use of insulin (H), controlled  -     Hemoglobin A1c  -     OPHTHALMOLOGY ADULT REFERRAL  -    Refill:  metFORMIN (GLUCOPHAGE) 500 MG tablet; Take 2 tablets (1,000 mg) by mouth 2 times daily (with meals)    Hypertension goal BP (blood pressure) < 140/90  -     Refill: lisinopril (PRINIVIL/ZESTRIL) 20 MG tablet; Take 1 tablet (20 mg) by mouth daily  -     Refill: metoprolol succinate ER (TOPROL-XL) 50 MG 24 hr tablet; Take 1.5 tablets (75 mg) by mouth daily    Hyperlipidemia LDL goal <100  -     Lipid Profile (Chol, Trig, HDL, LDL calc); Future  -     Refill; rosuvastatin (CRESTOR) 40 MG tablet; Take 1 tablet (40 mg) by mouth daily     CKD (chronic kidney disease) stage 3, GFR 30-59 ml/min (H)  -     " "Creatinine    Immunity status testing  -     Mumps Antibody IgG  -     Rubella Antibody IgG Quantitative  -     Rubeola Antibody IgG    Need for vaccination  -     ZOSTER VACCINE RECOMBINANT ADJUVANTED IM NJX  -     ADMIN 1st VACCINE        End of Life Planning:  Patient currently has an advanced directive: No.  I have verified the patient's ablity to prepare an advanced directive/make health care decisions.  Literature was provided to assist patient in preparing an advanced directive.    COUNSELING:  Reviewed preventive health counseling, as reflected in patient instructions       Regular exercise       Healthy diet/nutrition    Estimated body mass index is 33.82 kg/m  as calculated from the following:    Height as of this encounter: 1.727 m (5' 8\").    Weight as of this encounter: 100.9 kg (222 lb 6.4 oz).    Weight management plan: Discussed healthy diet and exercise guidelines     reports that she quit smoking about 29 years ago. Her smoking use included cigarettes. She started smoking about 59 years ago. She quit after 30.00 years of use. She has never used smokeless tobacco.      Appropriate preventive services were discussed with this patient, including applicable screening as appropriate for cardiovascular disease, diabetes, osteopenia/osteoporosis, and glaucoma.  As appropriate for age/gender, discussed screening for colorectal cancer, prostate cancer, breast cancer, and cervical cancer. Checklist reviewing preventive services available has been given to the patient.    Reviewed patients plan of care and provided an AVS. The Basic Care Plan (routine screening as documented in Health Maintenance) for Anamaria meets the Care Plan requirement. This Care Plan has been established and reviewed with the Patient.    Counseling Resources:  ATP IV Guidelines  Pooled Cohorts Equation Calculator  Breast Cancer Risk Calculator  FRAX Risk Assessment  ICSI Preventive Guidelines  Dietary Guidelines for Americans, " 2010  USDA's MyPlate  ASA Prophylaxis  Lung CA Screening    Follow up in 3 months- med check/diabetes follow up.   Next Annual Physical due in 7 /2020    Margaret Chandra MD  [facility]

## 2019-07-02 NOTE — LETTER
July 12, 2019      Anamaria Parra  [address] [place] [address]        Dear ,    We are writing to inform you of your test results.    Your Kidney function remains stable.   A1C improved to 7.4 like we discussed in the office. Keep it up!   Immunity status testing shows that you are not immune against Rubella.   Recommend that you schedule a nurse only visit to get a booster shot.     Resulted Orders   Hemoglobin A1c   Result Value Ref Range    Hemoglobin A1C 7.4 (H) 0 - 5.6 %      Comment:      Normal <5.7% Prediabetes 5.7-6.4%  Diabetes 6.5% or higher - adopted from ADA   consensus guidelines.     Mumps Antibody IgG   Result Value Ref Range    Mumps Antibody IgG 7.3 (H) 0.0 - 0.8 AI      Comment:      Positive, suggests prev. exposure and probable immunity  Antibody index (AI) values reflect qualitative changes in antibody   concentration that cannot be directly associated with clinical condition or   disease state.     Rubella Antibody IgG Quantitative   Result Value Ref Range    Rubella Antibody IgG Quantitative 5 IU/mL      Comment:      Negative  Reference Range:    Unvaccinated Negative 0-7 IU/mL  Vaccinated or previous exposure Positive 10 IU/ml or greater     Rubeola Antibody IgG   Result Value Ref Range    Rubeola (Measles) Antibody IgG >8.0 (H) 0.0 - 0.8 AI      Comment:      Positive, suggests prev. exposure and probable immunity  Antibody index (AI) values reflect qualitative changes in antibody   concentration that cannot be directly associated with clinical condition or   disease state.     Creatinine   Result Value Ref Range    Creatinine 1.36 (H) 0.52 - 1.04 mg/dL    GFR Estimate 38 (L) >60 mL/min/[1.73_m2]      Comment:      Non  GFR Calc  Starting 12/18/2018, serum creatinine based estimated GFR (eGFR) will be   calculated using the Chronic Kidney Disease Epidemiology Collaboration   (CKD-EPI) equation.      GFR Estimate If Black 44 (L) >60 mL/min/[1.73_m2]       Comment:       GFR Calc  Starting 12/18/2018, serum creatinine based estimated GFR (eGFR) will be   calculated using the Chronic Kidney Disease Epidemiology Collaboration   (CKD-EPI) equation.         If you have any questions or concerns, please call the clinic at the number listed above.       Sincerely,        Margaret Chandra MD/kriso

## 2019-07-12 DIAGNOSIS — Z23 NEED FOR VACCINATION: Primary | ICD-10-CM

## 2019-07-22 DIAGNOSIS — N18.30 CKD (CHRONIC KIDNEY DISEASE) STAGE 3, GFR 30-59 ML/MIN (H): ICD-10-CM

## 2019-07-25 RX ORDER — FUROSEMIDE 40 MG
40 TABLET ORAL DAILY
Qty: 90 TABLET | Refills: 4 | Status: SHIPPED | OUTPATIENT
Start: 2019-07-25 | End: 2020-08-09

## 2019-08-11 DIAGNOSIS — K21.9 GASTROESOPHAGEAL REFLUX DISEASE WITHOUT ESOPHAGITIS: ICD-10-CM

## 2019-08-12 DIAGNOSIS — I25.810 CORONARY ARTERY DISEASE INVOLVING AUTOLOGOUS ARTERY CORONARY BYPASS GRAFT WITHOUT ANGINA PECTORIS: ICD-10-CM

## 2019-08-12 RX ORDER — OMEPRAZOLE 40 MG/1
CAPSULE, DELAYED RELEASE ORAL
Qty: 90 CAPSULE | Refills: 1 | Status: SHIPPED | OUTPATIENT
Start: 2019-08-12 | End: 2020-02-14

## 2019-08-12 NOTE — TELEPHONE ENCOUNTER
"Requested Prescriptions   Pending Prescriptions Disp Refills     omeprazole (PRILOSEC) 40 MG DR capsule [Pharmacy Med Name: OMEPRAZOLE 40MG CAPSULES] 90 capsule 0     Sig: TAKE 1 CAPSULE(40 MG) BY MOUTH EVERY DAY 30 TO 60 MINUTES BEFORE A MEAL   Last Written Prescription Date:  5-12-19  Last Fill Quantity: 90,  # refills: 0   Last office visit: 7/2/2019 with prescribing provider:  7-2-19   Future Office Visit:      PPI Protocol Passed - 8/11/2019  1:13 PM        Passed - Not on Clopidogrel (unless Pantoprazole ordered)        Passed - No diagnosis of osteoporosis on record        Passed - Recent (12 mo) or future (30 days) visit within the authorizing provider's specialty     Patient had office visit in the last 12 months or has a visit in the next 30 days with authorizing provider or within the authorizing provider's specialty.  See \"Patient Info\" tab in inbasket, or \"Choose Columns\" in Meds & Orders section of the refill encounter.              Passed - Medication is active on med list        Passed - Patient is age 18 or older        Passed - No active pregnacy on record        Passed - No positive pregnancy test in past 12 months        "

## 2019-08-13 RX ORDER — NITROGLYCERIN 0.4 MG/1
0.4 TABLET SUBLINGUAL EVERY 5 MIN PRN
Qty: 60 TABLET | Refills: 1 | Status: SHIPPED | OUTPATIENT
Start: 2019-08-13 | End: 2022-05-04

## 2019-08-13 NOTE — TELEPHONE ENCOUNTER
Prescription approved per [facility] Refill Protocol.  Jeanine Villalobos RN

## 2019-08-14 ENCOUNTER — ALLIED HEALTH/NURSE VISIT (OUTPATIENT)
Dept: NURSING | Facility: CLINIC | Age: 74
End: 2019-08-14
Payer: COMMERCIAL

## 2019-08-14 VITALS
HEART RATE: 89 BPM | TEMPERATURE: 98.1 F | DIASTOLIC BLOOD PRESSURE: 79 MMHG | SYSTOLIC BLOOD PRESSURE: 132 MMHG | BODY MASS INDEX: 33.69 KG/M2 | OXYGEN SATURATION: 97 % | RESPIRATION RATE: 20 BRPM | WEIGHT: 221.6 LBS

## 2019-08-14 DIAGNOSIS — Z23 ENCOUNTER FOR IMMUNIZATION: Primary | ICD-10-CM

## 2019-08-14 PROCEDURE — 90471 IMMUNIZATION ADMIN: CPT

## 2019-08-14 PROCEDURE — 99207 ZZC NO CHARGE NURSE ONLY: CPT

## 2019-08-14 PROCEDURE — 90707 MMR VACCINE SC: CPT

## 2019-08-14 NOTE — Clinical Note
Patient was in for vaccination and stated that she is needing a refill of tramadol.  She only has enough for this week.  She can  written script if when ready, if it cannot be sent to pharmacy.

## 2019-08-14 NOTE — PROGRESS NOTES
Screening Questionnaire for Adult Immunization    Are you sick today?   No   Do you have allergies to medications, food, a vaccine component or latex?   No   Have you ever had a serious reaction after receiving a vaccination?   No   Do you have a long-term health problem with heart disease, lung disease, asthma, kidney disease, metabolic disease (e.g. diabetes), anemia, or other blood disorder?   Yes   Do you have cancer, leukemia, HIV/AIDS, or any other immune system problem?   No   In the past 3 months, have you taken medications that affect  your immune system, such as prednisone, other steroids, or anticancer drugs; drugs for the treatment of rheumatoid arthritis, Crohn s disease, or psoriasis; or have you had radiation treatments?   No   Have you had a seizure, or a brain or other nervous system problem?   No   During the past year, have you received a transfusion of blood or blood     products, or been given immune (gamma) globulin or antiviral drug?   No   For women: Are you pregnant or is there a chance you could become        pregnant during the next month?   NA   Have you received any vaccinations in the past 4 weeks?   No     Immunization questionnaire was positive for at least one answer.  Has heart disease and type 2 diabetes.        Per orders of Dr. Chandra, injection of MMR given by Laura Ramsay. Patient instructed to remain in clinic for 15 minutes afterwards, and to report any adverse reaction to me immediately.     /79   Pulse 89   Temp 98.1  F (36.7  C) (Tympanic)   Resp 20   Wt 100.5 kg (221 lb 9.6 oz)   SpO2 97%   BMI 33.69 kg/m        Patient is requesting refill of Tramadol she only has enough for this week.  Note routed to Dr. Chandra for a refill.       Screening performed by Laura Ramsay on 8/14/2019 at 9:01 AM.

## 2019-08-21 DIAGNOSIS — G89.4 CHRONIC PAIN SYNDROME: ICD-10-CM

## 2019-08-21 NOTE — TELEPHONE ENCOUNTER
Routing refill request to provider for review/approval because:  Drug not on the [facility] refill protocol.  has had the 3 fills. Last done 7/22/19

## 2019-08-21 NOTE — TELEPHONE ENCOUNTER
Requested Prescriptions   Pending Prescriptions Disp Refills     traMADol (ULTRAM) 50 MG tablet [Pharmacy Med Name: TRAMADOL 50MG TABLETS] 180 tablet 0     Sig: TAKE 2 TABLETS BY MOUTH EVERY 8 HOURS, UP TO A MAX OF 6 TABLETS PER DAY  Tramadol      Last Written Prescription Date:  7/21/19  Last Fill Quantity: 180,   # refills: 0  Last Office Visit: 7/2/19 [place]  Future Office visit:       Routing refill request to provider for review/approval because:  Drug not on the [facility], [facility] or [facility] refill protocol or controlled substance       There is no refill protocol information for this order

## 2019-08-23 RX ORDER — TRAMADOL HYDROCHLORIDE 50 MG/1
TABLET ORAL
Qty: 180 TABLET | Refills: 0 | Status: SHIPPED | OUTPATIENT
Start: 2019-08-23 | End: 2019-09-20

## 2019-09-20 DIAGNOSIS — G89.4 CHRONIC PAIN SYNDROME: ICD-10-CM

## 2019-09-20 NOTE — TELEPHONE ENCOUNTER
TRAMADOL 50MG TABLETS      Last Written Prescription Date:  08/23/19  Last Fill Quantity: 180,   # refills: 0  Last Office Visit: 07/02/19  MAYITO Chandra  Future Office visit:    Next 5 appointments (look out 90 days)    Oct 03, 2019  9:00 AM CDT  [facility] Visit with Margaret [facility] MD  [facility] [address]  [place] [address]  [phone]           Routing refill request to provider for review/approval because:  Drug not on the [facility], [facility] or  Health refill protocol or controlled substance

## 2019-09-23 NOTE — TELEPHONE ENCOUNTER
Routing refill request to provider for review/approval because:  Drug not on the [facility] refill protocol     [facility]  8-23-19  #180 Tramadol  [facility]  7-22-19  #180  [facility]adol  [facility]  6[facility]21-[facility] #[facility]  [facility]

## 2019-09-25 RX ORDER — TRAMADOL HYDROCHLORIDE 50 MG/1
TABLET ORAL
Qty: 180 TABLET | Refills: 0 | Status: SHIPPED | OUTPATIENT
Start: 2019-09-25 | End: 2019-10-23

## 2019-10-03 ENCOUNTER — OFFICE VISIT (OUTPATIENT)
Dept: FAMILY MEDICINE | Facility: CLINIC | Age: 74
End: 2019-10-03
Payer: COMMERCIAL

## 2019-10-03 VITALS
WEIGHT: 217.8 LBS | BODY MASS INDEX: 33.01 KG/M2 | RESPIRATION RATE: 20 BRPM | OXYGEN SATURATION: 97 % | SYSTOLIC BLOOD PRESSURE: 136 MMHG | HEIGHT: 68 IN | DIASTOLIC BLOOD PRESSURE: 74 MMHG | TEMPERATURE: 98.3 F | HEART RATE: 112 BPM

## 2019-10-03 DIAGNOSIS — E78.5 HYPERLIPIDEMIA LDL GOAL <100: ICD-10-CM

## 2019-10-03 DIAGNOSIS — Z23 NEED FOR VACCINATION: ICD-10-CM

## 2019-10-03 DIAGNOSIS — Z79.891 LONG TERM (CURRENT) USE OF OPIATE ANALGESIC: ICD-10-CM

## 2019-10-03 DIAGNOSIS — Z78.0 POSTMENOPAUSAL ESTROGEN DEFICIENCY: ICD-10-CM

## 2019-10-03 DIAGNOSIS — E11.65 TYPE 2 DIABETES MELLITUS WITH HYPERGLYCEMIA, WITHOUT LONG-TERM CURRENT USE OF INSULIN (H): Primary | ICD-10-CM

## 2019-10-03 DIAGNOSIS — Z79.899 CONTROLLED SUBSTANCE AGREEMENT SIGNED: ICD-10-CM

## 2019-10-03 LAB
AMPHETAMINES UR QL: NOT DETECTED NG/ML
BARBITURATES UR QL SCN: NOT DETECTED NG/ML
BENZODIAZ UR QL SCN: NOT DETECTED NG/ML
BUPRENORPHINE UR QL: NOT DETECTED NG/ML
CANNABINOIDS UR QL: NOT DETECTED NG/ML
COCAINE UR QL SCN: NOT DETECTED NG/ML
D-METHAMPHET UR QL: NOT DETECTED NG/ML
HBA1C MFR BLD: 7.4 % (ref 0–5.6)
METHADONE UR QL SCN: NOT DETECTED NG/ML
OPIATES UR QL SCN: NOT DETECTED NG/ML
OXYCODONE UR QL SCN: NOT DETECTED NG/ML
PCP UR QL SCN: NOT DETECTED NG/ML
PROPOXYPH UR QL: NOT DETECTED NG/ML
TRICYCLICS UR QL SCN: NOT DETECTED NG/ML

## 2019-10-03 PROCEDURE — G0008 ADMIN INFLUENZA VIRUS VAC: HCPCS | Mod: 59 | Performed by: FAMILY MEDICINE

## 2019-10-03 PROCEDURE — 90750 HZV VACC RECOMBINANT IM: CPT | Performed by: FAMILY MEDICINE

## 2019-10-03 PROCEDURE — 90471 IMMUNIZATION ADMIN: CPT | Performed by: FAMILY MEDICINE

## 2019-10-03 PROCEDURE — 36415 COLL VENOUS BLD VENIPUNCTURE: CPT | Performed by: FAMILY MEDICINE

## 2019-10-03 PROCEDURE — 83036 HEMOGLOBIN GLYCOSYLATED A1C: CPT | Performed by: FAMILY MEDICINE

## 2019-10-03 PROCEDURE — 99214 OFFICE O/P EST MOD 30 MIN: CPT | Mod: 25 | Performed by: FAMILY MEDICINE

## 2019-10-03 PROCEDURE — 90662 IIV NO PRSV INCREASED AG IM: CPT | Performed by: FAMILY MEDICINE

## 2019-10-03 PROCEDURE — 80306 DRUG TEST PRSMV INSTRMNT: CPT | Performed by: FAMILY MEDICINE

## 2019-10-03 ASSESSMENT — MIFFLIN-ST. JEOR: SCORE: 1526.43

## 2019-10-03 NOTE — LETTER
[facility]  10/03/19    Patient: Anamaria Parra  YOB: 1945  Medical Record Number: [id number]                                                                  Opioid / Opioid Plus Controlled Substance Agreement    I understand that my care provider has prescribed an opioid (narcotic) controlled substance to help manage my condition(s). I am taking this medicine to help me function or work. I know this is strong medicine, and that it can cause serious side effects. Opioid medicine can be sedating, addicting and may cause a dependency on the drug. They can affect my ability to drive or think, and cause depression. They need to be taken exactly as prescribed. Combining opioids with certain medicines or chemicals (such as cocaine, sedatives and tranquilizers, sleeping pills, meth) can be dangerous or even fatal. Also, if I stop opioids suddenly, I may have severe withdrawal symptoms. Last, I understand that opioids do not work for all types of pain nor for all patients. If not helpful, I may be asked to stop them.        The risks, benefits, and side effects of these medicine(s) were explained to me. I agree that:    1. I will take part in other treatments as advised by my care team. This may be psychiatry or counseling, physical therapy, behavioral therapy, group treatment or a referral to a pain clinic. I will reduce or stop my medicine when my care team tells me to do so.  2. I will take my medicines as prescribed. I will not change the dose or schedule unless my care team tells me to. There will be no refills if I  run out early.   I may be contactedwithout warning and asked to complete a urine drug test or pill count at any time.   3. I will keep all my appointments, and understand this is part of the monitoring of opioids. My care team may require an office visit for EVERY opioid/controlled substance refill. If I miss appointments or don t follow instructions, my care team may stop my  medicine.  4. I will not ask other providers to prescribe controlled substances, and I will not accept controlled substances from other people. If I need another prescribed controlled substance for a new reason, I will tell my care team within 1 business day.  5. I will use one pharmacy to fill all of my controlled substance prescriptions, and it is up to me to make sure that I do not run out of my medicines on weekends or holidays. If my care team is willing to refill my opioid prescription without a visit, I must request refills only during office hours, refills may take up to 3 days to process, and it may take up to 5 to 7 days for my medicine to be mailed and ready at my pharmacy. Prescriptions will not be mailed anywhere except my pharmacy.        [id number]  Rev 12/18         Registration to scan to EHR                             Page 1 of 2               Controlled Substance Agreement Opioid        [facility]  10/03/19  Patient: Anamaria Parra  YOB: 1945  Medical Record Number: [id number]                                                                  6. I am responsible for my prescriptions. If the medicine/prescription is lost or stolen, it will not be replaced. I also agree not to share controlled substance medicines with anyone.  7. I agree to not use ANY illegal or recreational drugs. This includes marijuana, cocaine, bath salts or other drugs. I agree not to use alcohol unless my care team says I may.          I agree to give urine samples whenever asked. If I don t give a urine sample, the care team may stop my medicine.    8. If I enroll in the [place] Medical Marijuana program, I will tell my care team. I will also sign an agreement to share my medical records with my care team.   9. I will bring in my list of medicines (or my medicine bottles) each time I come to the clinic.   10. I will tell my care team right away if I become pregnant or have a new medical problem treated  outside of my regular clinic.  11. I understand that this medicine can affect my thinking and judgment. It may be unsafe for me to drive, use machinery and do dangerous tasks. I will not do any of these things until I know how the medicine affects me. If my dose changes, I will wait to see how it affects me. I will contact my care team if I have concerns about medicine side effects.    I understand that if I do not follow any of the conditions above, my prescriptions or treatment may be stopped.      I agree that my provider, clinic care team, and pharmacy may work with any city, state or federal law enforcement agency that investigates the misuse, sale, or other diversion of my controlled medicine. I will allow my provider to discuss my care with or share a copy of this agreement with any other treating provider, pharmacy or emergency room where I receive care. I agree to give up (waive) any right of privacy or confidentiality with respect to these consents.     I have read this agreement and have asked questions about anything I did not understand.      ________________________________________________________________________  Patient signature - Date/Time -  Anamaria Parra                                      ________________________________________________________________________  Witness signature                                                            ________________________________________________________________________  Provider signature - Margaret Chandra MD      [id number]  Rev 12/18         Registration to scan to EHR                         Page 2 of 2                   Controlled Substance Agreement Opioid           Page 1 of 2  Opioid Pain Medicines (also known as Narcotics)  What You Need to Know    What are opioids?   Opioids are pain medicines that must be prescribed by a doctor.  They are also known as narcotics.    Examples are:     morphine (MS Contin, Rdaha)    oxycodone  (Oxycontin)    oxycodone and acetaminophen (Percocet)    hydrocodone and acetaminophen (Vicodin, Norco)     fentanyl patch (Duragesic)     hydromorphone (Dilaudid)     methadone     What do opioids do well?   Opioids are best for short-term pain after a surgery or injury. They also work well for cancer pain. Unlike other pain medicines, they do not cause liver or kidney failure or ulcers. They may help some people with long-lasting (chronic) pain.     What do opioids NOT do well?   Opioids never get rid of pain entirely, and they do not work well for most patients with chronic pain. Opioids do not reduce swelling, one of the causes of pain. They also don t work well for nerve pain.                           For informational purposes only.  Not to replace the advice of your care provider.  Copyright [facility]. All right reserved. [employer] 311893-Leo 02/18.      Page 2 of 2    Risks and side effects   Talk to your doctor before you start or decide to keep taking one of these medicines. Side effects include:    Lowering your breathing rate enough to cause death    Overdose, including death, especially if taking higher than prescribed doses    Long-term opioid use    Worse depression symptoms; less pleasure in things you usually enjoy    Feeling tired or sluggish    Slower thoughts or cloudy thinking    Being more sensitive to pain over time; pain is harder to control    Trouble sleeping or restless sleep    Changes in hormone levels (for example, less testosterone)    Changes in sex drive or ability to have sex    Constipation    Unsafe driving    Itching and sweating    Feeling dizzy    Nausea, vomiting and dry mouth    What else should I know about opioids?  When someone takes opioids for too long or too often, they become dependent. This means that if you stop or reduce the medicine too quickly, you will have withdrawal symptoms.    Dependence is not the same as addiction. Addiction is when  people keep using a substance that harms their body, their mind or their relations with others. If you have a history of drug or alcohol abuse, taking opioids can cause a relapse.    Over time, opioids don t work as well. Most people will need higher and higher doses. The higher the dose, the more serious the side effects. We don t know the long-term effects of opioids.      Prescribed opioids aren't the best way to manage chronic pain    Other ways to manage pain include:      Ibuprofen or acetaminophen.  You should always try this first.      Treat health problems that may be causing pain.      acupuncture or massage, deep breathing, meditation, visual imagery, aromatherapy.      Use heat or ice at the pain site      Physical therapy and exercise      Stop smoking      See a counselor or therapist                                                  People who have used opioids for a long time may have a lower quality of life, worse depression, higher levels of pain and more visits to doctors.    Never share your opioids with others. Be sure to store opioids in a secure place, locked if possible.Young children can easily swallow them and overdose.     You can overdose on opioids.  Signs of overdose include decrease or loss of consciousness, slowed breathing, trouble waking and blue lips.  If someone is worried about overdose, they should call 911.    If you are at risk for overdose, you may get naloxone (Narcan, a medicine that reverses the effects of opioids.  If you overdose, a friend or family member can give you Narcan while waiting for the ambulance.  They need to know the signs of overdose and how to give Narcan.    While you're taking opioids:    Don't use alcohol or street drugs. Taking them together can cause death.    Don't take any of these medicines unless your doctor says its okay.  Taking these with opioids can cause death.    Benzodiazepines (such as lorazepam         or diazepam)    Muscle relaxers  (such as cyclobenzaprine)    sleeping pills    other opioids    Safe disposal of opioids  Find your area drug take-back program, your pharmacy mail-back program, buy a special disposal bag (such as Deterra) from your pharmacy or flush them down the toilet.  Use the guidelines at:  www.fda.gov/drugs/resourcesforyou

## 2019-10-03 NOTE — PROGRESS NOTES
Subjective     Anamaria Parra is a 74 year old female who presents to clinic today for the following health issues:    HPI         Diabetes Follow-up        Patient is checking blood sugars: 2 times a week 140-150= fasting    Diabetic concerns: None     Symptoms of hypoglycemia (low blood sugar): none     Paresthesias (numbness or burning in feet) or sores: No     Date of last diabetic eye exam (annually):  Has not had eye exam   Date of last Urine micro albumin screen, (annually):    Component      Latest Ref Rng & Units 5/21/2019   Creatinine Urine      mg/dL 108   Albumin Urine mg/L      mg/L 16   Albumin Urine mg/g Cr      0 - 25 mg/g Cr 15.09       Pneumococcal Vaccine:  Yes: utd    Influenza Vaccine (annually):   Yes: 10/3/19    Tobacco free:  Yes    Aspirin use:  Yes: 81     Amount of exercise or physical activity: None    Problems taking medications regularly: No    Medication side effects: none    Diet: regular (no restrictions)        HYPERLIPIDEMIA - Patient has a long history of significant Hyperlipidemia requiring medication for treatment with recent good control. Patient reports no problems or side effects with the medication- Crestor 40 mg daily. Last lipid panel    Recent Labs   Lab Test 08/13/18  0854 08/03/17  0958  06/23/15  1049 05/22/14  0912   CHOL 134 121   < > 139 137   HDL 43* 46*   < > 51 44*   LDL 61 43   < > 67 75   TRIG 149 159*   < > 105 92   CHOLHDLRATIO  --   --   --  2.7 3.1    < > = values in this interval not displayed.       Patient has a known history of chronic pain with multiple joint pain currently on tramadol.  Due for a urine drug screen and due to sign a controlled substance agreement    HEALTH CARE MAINTENANCE: Due for a flu shot, shingles vaccine, and schedule bone density scan.    Patient Active Problem List   Diagnosis     Onychomycosis due to dermatophyte     Psoriasis     PAD (peripheral artery disease) (H)     Carotid stenosis     Spasmodic torticollis     CKD  (chronic kidney disease) stage 3, GFR 30-59 ml/min (H)     Hyperthyroidism     Hyperlipidemia LDL goal <100     Peripheral neuropathy     ACP (advance care planning)     Partial tear of rotator cuff     AC (acromioclavicular) joint arthritis - right     Shoulder impingement - right     Osteoarthritis of shoulder - right     Ovarian cyst     Morbid obesity due to excess calories (H)     Chronic pain syndrome     Benign essential hypertension     Coronary artery disease involving autologous artery coronary bypass graft without angina pectoris     Type 2 diabetes mellitus with diabetic nephropathy, without long-term current use of insulin (H)     Gastroesophageal reflux disease without esophagitis     S/P coronary artery stent placement     Alcohol dependence in remission (H)     Past Surgical History:   Procedure Laterality Date     ANGIOGRAM      with 2 stents     ANGIOGRAM  04     ANGIOGRAM  2017, 2 stents in RCA    at [facility] ANEST[facility],SURGERY OF SHOULDER  07    left shoulder arthroplasty     C HAND/FINGER SURGERY UNLISTED      right thumb deep lac repair     CLOSED RX CLAVICLE FRACTURE       CORONARY ARTERY BYPASS  09     TONSILLECTOMY & ADENOIDECTOMY         Social History     Tobacco Use     Smoking status: Former Smoker     Years: 30.00     Types: Cigarettes     Start date: 1960     Last attempt to quit: 1990     Years since quittin.7     Smokeless tobacco: Never Used   Substance Use Topics     Alcohol use: No     Alcohol/week: 0.0 standard drinks     Comment: history of abuse      Family History   Problem Relation Age of Onset     Hypertension Mother      Cerebrovascular Disease Mother      Cardiovascular Father      C.A.D. Brother      Diabetes Brother      Hypertension Brother      Cardiovascular Brother          Current Outpatient Medications   Medication Sig Dispense Refill     aspirin 81 MG tablet Take 81 mg by mouth daily       B-12 OR 1000 MCG DAILY       FISH OIL  "1000 MG OR CAPS 2 TABLETS DAILY       FOLIC ACID OR 3000 MCG DAILY       furosemide (LASIX) 40 MG tablet Take 1 tablet (40 mg) by mouth daily 90 tablet 4     glipiZIDE (GLUCOTROL XL) 5 MG 24 hr tablet Take 1 tablet (5 mg) by mouth daily (with breakfast) 90 tablet 3     lisinopril (PRINIVIL/ZESTRIL) 20 MG tablet Take 1 tablet (20 mg) by mouth daily 90 tablet 3     metFORMIN (GLUCOPHAGE) 500 MG tablet Take 2 tablets (1,000 mg) by mouth 2 times daily (with meals) 360 tablet 3     metoprolol succinate ER (TOPROL-XL) 50 MG 24 hr tablet Take 1.5 tablets (75 mg) by mouth daily 135 tablet 5     MULTI-VITAMIN OR TABS 1 TABLET DAILY       nitroGLYcerin (NITROSTAT) 0.4 MG sublingual tablet Place 1 tablet (0.4 mg) under the tongue every 5 minutes as needed up to 3 tablets per episode. 60 tablet 1     omeprazole (PRILOSEC) 40 MG DR capsule TAKE 1 CAPSULE(40 MG) BY MOUTH EVERY DAY 30 TO 60 MINUTES BEFORE A MEAL 90 capsule 1     rosuvastatin (CRESTOR) 40 MG tablet Take 1 tablet (40 mg) by mouth daily Replaces Lipitor because her insurance approved Crestor for another year 90 tablet 3     STOOL SOFTENER OR None Entered       traMADol (ULTRAM) 50 MG tablet TAKE 2 TABLETS BY MOUTH EVERY 8 HOURS, UP TO A MAX OF 6 TABLETS PER  tablet 0     VITAMIN D 1000 UNIT OR CAPS 1 CAPSULE DAILY       Allergies   Allergen Reactions     Dilantin [Phenytoin]      rash         Reviewed and updated as needed this visit by Provider       Review of Systems   ROS COMP: Constitutional, HEENT, cardiovascular, pulmonary, gi and gu systems are negative, except as otherwise noted.      Objective    /74   Pulse 112   Temp 98.3  F (36.8  C) (Tympanic)   Resp 20   Ht 1.727 m (5' 8\")   Wt 98.8 kg (217 lb 12.8 oz)   SpO2 97%   Breastfeeding? No   BMI 33.12 kg/m    Body mass index is 33.12 kg/m .  Physical Exam   GENERAL: healthy, alert and no distress  RESP: lungs clear to auscultation - no rales, rhonchi or wheezes  CV: regular rate and " rhythm, normal S1 S2, no S3 or S4, no murmur, click or rub, no peripheral edema and peripheral pulses strong  ABDOMEN: soft, nontender, no hepatosplenomegaly, no masses and bowel sounds normal  PSYCH: mentation appears normal, affect normal/bright        Diagnostic Test Results:  Reviewed and discussed with patient prior to discharge.  Results for orders placed or performed in visit on 10/03/19   Hemoglobin A1c   Result Value Ref Range    Hemoglobin A1C 7.4 (H) 0 - 5.6 %   Urine Drugs of Abuse Screen Panel 13   Result Value Ref Range    Cannabinoids (49-xdb-5-carboxy-9-THC) Not Detected NDET^Not Detected ng/mL    Phencyclidine (Phencyclidine) Not Detected NDET^Not Detected ng/mL    Cocaine (Benzoylecgonine) Not Detected NDET^Not Detected ng/mL    Methamphetamine (d-Methamphetamine) Not Detected NDET^Not Detected ng/mL    Opiates (Morphine) Not Detected NDET^Not Detected ng/mL    Amphetamine (d-Amphetamine) Not Detected NDET^Not Detected ng/mL    Benzodiazepines (Nordiazepam) Not Detected NDET^Not Detected ng/mL    Tricyclic Antidepressants (Desipramine) Not Detected NDET^Not Detected ng/mL    Methadone (Methadone) Not Detected NDET^Not Detected ng/mL    Barbiturates (Butalbital) Not Detected NDET^Not Detected ng/mL    Oxycodone (Oxycodone) Not Detected NDET^Not Detected ng/mL    Propoxyphene (Norpropoxyphene) Not Detected NDET^Not Detected ng/mL    Buprenorphine (Buprenorphine) Not Detected NDET^Not Detected ng/mL               Assessment & Plan     Anamaria was seen today for diabetes.    Diagnoses and all orders for this visit:    Type 2 diabetes mellitus with hyperglycemia, without long-term current use of insulin (H), stable. HgbA1C 7.4. Goal A1C < 8.0  -     Hemoglobin A1c  -     Continue current management.      Hyperlipidemia LDL goal <100  On Crestor 40 mg daily due for lipid panel.  Reminded to schedule fasting lab only appointment to get the lipid panel completed.    Postmenopausal estrogen  "deficiency  Reminded to schedule bone density scan.    Long term (current) use of opiate analgesic, Tramadol   -     Urine Drugs of Abuse Screen Panel 13    Controlled substance agreement signed   Agreement signed today.  Patient given a copy.    Need for vaccination  -     FLU VACCINE, INCREASED ANTIGEN, PRESV FREE, AGE 65+ [09292]  -          ADMIN VACCINE, FIRST [39363]  -          ADMIN VACCINE, ADDL [[id number]]  -     ZOSTER VACCINE RECOMBINANT ADJUVANTED IM NJX         BMI:   Estimated body mass index is 33.12 kg/m  as calculated from the following:    Height as of this encounter: 1.727 m (5' 8\").    Weight as of this encounter: 98.8 kg (217 lb 12.8 oz).   Weight management plan: Discussed healthy diet and exercise guidelines        Return in about 4 months (around 2/3/2020) for Diabetes Follow Up with a HgbA1C prior to visit.    Margaret Chandra MD  [facility]      "

## 2019-10-03 NOTE — LETTER
October 7, 2019      Anamaria Parra  [address] [place] [address]        Dear ,    We are writing to inform you of your test results.    Here are your recent lab test results.   Urine drug screen was appropriate.   A1C was 7.4 like we discussed in the office.   Continue your current medications as prescribed and follow up in 4 months.   Resulted Orders   Hemoglobin A1c   Result Value Ref Range    Hemoglobin A1C 7.4 (H) 0 - 5.6 %      Comment:      Normal <5.7% Prediabetes 5.7-6.4%  Diabetes 6.5% or higher - adopted from ADA   consensus guidelines.     Urine Drugs of Abuse Screen Panel 13   Result Value Ref Range    Cannabinoids (28-cga-0-carboxy-9-THC) Not Detected NDET^Not Detected ng/mL      Comment:      Cutoff for a negative cannabinoid is 50 ng/mL or less.    Phencyclidine (Phencyclidine) Not Detected NDET^Not Detected ng/mL      Comment:      Cutoff for a negative PCP is 25 ng/mL or less.    Cocaine (Benzoylecgonine) Not Detected NDET^Not Detected ng/mL      Comment:      Cutoff for a negative cocaine is 150 ng/ml or less.    Methamphetamine (d-Methamphetamine) Not Detected NDET^Not Detected ng/mL      Comment:      Cutoff for a negative methamphetamine is 500 ng/ml or less.    Opiates (Morphine) Not Detected NDET^Not Detected ng/mL      Comment:      Cutoff for a negative opiate is 100 ng/ml or less.    Amphetamine (d-Amphetamine) Not Detected NDET^Not Detected ng/mL      Comment:      Cutoff for a negative amphetamine is 500 ng/mL or less.    Benzodiazepines (Nordiazepam) Not Detected NDET^Not Detected ng/mL      Comment:      Cutoff for a negative benzodiazepine is 150 ng/ml or less.    Tricyclic Antidepressants (Desipramine) Not Detected NDET^Not Detected ng/mL      Comment:      Cutoff for a negative tricyclic antidepressant is 300 ng/ml or less.    Methadone (Methadone) Not Detected NDET^Not Detected ng/mL      Comment:      Cutoff for a negative methadone is 200 ng/ml or less.     Barbiturates (Butalbital) Not Detected NDET^Not Detected ng/mL      Comment:      Cutoff for a negative barbituate is 200 ng/ml or less.    Oxycodone (Oxycodone) Not Detected NDET^Not Detected ng/mL      Comment:      Cutoff for a negative Oxycodone is 100 ng/mL or less.    Propoxyphene (Norpropoxyphene) Not Detected NDET^Not Detected ng/mL      Comment:      Cutoff for a negative propoxyphene is 300 ng/ml or less    Buprenorphine (Buprenorphine) Not Detected NDET^Not Detected ng/mL      Comment:      Cutoff for a negative buprenorphine is 10 ng/ml or less       If you have any questions or concerns, please call the clinic at the number listed above.       Sincerely,        Margaret Chandra MD/o

## 2019-10-23 DIAGNOSIS — G89.4 CHRONIC PAIN SYNDROME: ICD-10-CM

## 2019-10-23 NOTE — TELEPHONE ENCOUNTER
Requested Prescriptions   Pending Prescriptions Disp Refills     traMADol (ULTRAM) 50 MG tablet [Pharmacy Med Name: TRAMADOL 50MG TABLETS]  TRAMADOL 50MG TABLETS      Last Written Prescription Date:  09/25/19  Last Fill Quantity: 180,   # refills: 0  Last Office Visit: 10/03/19  MAYITO Chandra  Future Office visit:       Routing refill request to provider for review/approval because:  Drug not on the G, P or [facility] refill protocol or controlled substance   180 tablet 0     Sig: TAKE 2 TABLETS BY MOUTH EVERY 8 HOURS UP TO A MAX OF 6 TABLETS PER DAY       There is no refill protocol information for this order

## 2019-10-23 NOTE — TELEPHONE ENCOUNTER
Routing refill request to provider for review/approval because:  Drug not on the [facility] refill protocol. Last filled 9/25/19 per .  Heather Gonzalez RN

## 2019-10-24 RX ORDER — TRAMADOL HYDROCHLORIDE 50 MG/1
TABLET ORAL
Qty: 180 TABLET | Refills: 0 | Status: SHIPPED | OUTPATIENT
Start: 2019-10-24 | End: 2019-11-20

## 2019-11-20 DIAGNOSIS — G89.4 CHRONIC PAIN SYNDROME: ICD-10-CM

## 2019-11-20 NOTE — TELEPHONE ENCOUNTER
Routing refill request to provider for review/approval because:  Drug not on the [facility] refill protocol. Last filled 10/24/19 per

## 2019-11-20 NOTE — TELEPHONE ENCOUNTER
Requested Prescriptions   Pending Prescriptions Disp Refills     traMADol (ULTRAM) 50 MG tablet [Pharmacy Med Name: TRAMADOL 50MG TABLETS]  TRAMADOL 50MG TABLETS      Last Written Prescription Date:  10/24/19  Last Fill Quantity: 180,   # refills: 0  Last Office Visit: 10/03/19  MAYITO Chandra  Future Office visit:       Routing refill request to provider for review/approval because:  Drug not on the G, P or [facility] refill protocol or controlled substance   180 tablet 0     Sig: TAKE 2 TABLETS BY MOUTH EVERY 8 HOURS UP TO A MAX OF 6 TABLETS PER DAY       There is no refill protocol information for this order

## 2019-11-21 RX ORDER — TRAMADOL HYDROCHLORIDE 50 MG/1
TABLET ORAL
Qty: 180 TABLET | Refills: 0 | Status: SHIPPED | OUTPATIENT
Start: 2019-11-21 | End: 2019-12-16

## 2019-12-16 DIAGNOSIS — G89.4 CHRONIC PAIN SYNDROME: ICD-10-CM

## 2019-12-16 NOTE — TELEPHONE ENCOUNTER
Requested Prescriptions   Pending Prescriptions Disp Refills     traMADol (ULTRAM) 50 MG tablet [Pharmacy Med Name: TRAMADOL 50MG TABLETS] 180 tablet 0     Sig: TAKE 2 TABLETS BY MOUTH EVERY 8 HOURS UP TO A MAX OF 6 TABLETS PER DAY       There is no refill protocol information for this order        Last Written Prescription Date:  11/21/19  Last Fill Quantity: 180,  # refills: 0   Last office visit: 10/3/2019 with prescribing provider:  Margaret Chandra     Future Office Visit:

## 2019-12-17 NOTE — TELEPHONE ENCOUNTER
Routing refill request to provider for review/approval because:  Drug not on the [facility] refill protocol     Per Epic traMADol (ULTRAM) 50 MG tablet last script written on 11/21/19 for #180    Please advise on refill.    Carlyn Ng, RN, BSN, PHN

## 2019-12-18 RX ORDER — TRAMADOL HYDROCHLORIDE 50 MG/1
TABLET ORAL
Qty: 180 TABLET | Refills: 0 | Status: SHIPPED | OUTPATIENT
Start: 2019-12-18 | End: 2020-01-18

## 2020-01-15 DIAGNOSIS — G89.4 CHRONIC PAIN SYNDROME: ICD-10-CM

## 2020-01-15 NOTE — TELEPHONE ENCOUNTER
Routing refill request to provider for review/approval because:  Drug not on the [facility] refill protocol. Last filled per   12/19/19

## 2020-01-15 NOTE — TELEPHONE ENCOUNTER
Requested Prescriptions   Pending Prescriptions Disp Refills     traMADol (ULTRAM) 50 MG tablet [Pharmacy Med Name: TRAMADOL 50MG TABLETS] 180 tablet      Sig: TAKE 2 TABLETS BY MOUTH EVERY 8 HOURS UP TO A MAX OF 6 TABLETS PER DAY   Last Written Prescription Date:  1-15-20  Last Fill Quantity: 180,  # refills: 0   Last office visit: 10/3/2019 with prescribing provider:  10-3-19   Future Office Visit:      There is no refill protocol information for this order

## 2020-01-18 RX ORDER — TRAMADOL HYDROCHLORIDE 50 MG/1
TABLET ORAL
Qty: 180 TABLET | Refills: 0 | Status: SHIPPED | OUTPATIENT
Start: 2020-01-18 | End: 2020-02-20

## 2020-02-20 ENCOUNTER — OFFICE VISIT (OUTPATIENT)
Dept: FAMILY MEDICINE | Facility: CLINIC | Age: 75
End: 2020-02-20
Payer: COMMERCIAL

## 2020-02-20 VITALS
RESPIRATION RATE: 20 BRPM | WEIGHT: 210.4 LBS | HEIGHT: 69 IN | HEART RATE: 112 BPM | DIASTOLIC BLOOD PRESSURE: 76 MMHG | OXYGEN SATURATION: 97 % | BODY MASS INDEX: 31.16 KG/M2 | TEMPERATURE: 99.2 F | SYSTOLIC BLOOD PRESSURE: 120 MMHG

## 2020-02-20 DIAGNOSIS — N18.30 CKD (CHRONIC KIDNEY DISEASE) STAGE 3, GFR 30-59 ML/MIN (H): ICD-10-CM

## 2020-02-20 DIAGNOSIS — E66.01 MORBID OBESITY DUE TO EXCESS CALORIES (H): ICD-10-CM

## 2020-02-20 DIAGNOSIS — E78.5 HYPERLIPIDEMIA LDL GOAL <100: ICD-10-CM

## 2020-02-20 DIAGNOSIS — G89.4 CHRONIC PAIN SYNDROME: ICD-10-CM

## 2020-02-20 DIAGNOSIS — E11.21 TYPE 2 DIABETES MELLITUS WITH DIABETIC NEPHROPATHY, WITHOUT LONG-TERM CURRENT USE OF INSULIN (H): Primary | ICD-10-CM

## 2020-02-20 PROBLEM — F10.21 ALCOHOL DEPENDENCE IN REMISSION (H): Status: RESOLVED | Noted: 2019-05-21 | Resolved: 2020-02-20

## 2020-02-20 LAB — HBA1C MFR BLD: 7.5 % (ref 0–5.6)

## 2020-02-20 PROCEDURE — 36415 COLL VENOUS BLD VENIPUNCTURE: CPT | Performed by: FAMILY MEDICINE

## 2020-02-20 PROCEDURE — 99214 OFFICE O/P EST MOD 30 MIN: CPT | Performed by: FAMILY MEDICINE

## 2020-02-20 PROCEDURE — 83036 HEMOGLOBIN GLYCOSYLATED A1C: CPT | Performed by: FAMILY MEDICINE

## 2020-02-20 RX ORDER — TRAMADOL HYDROCHLORIDE 50 MG/1
TABLET ORAL
Qty: 180 TABLET | Refills: 0 | Status: SHIPPED | OUTPATIENT
Start: 2020-02-20 | End: 2020-03-18

## 2020-02-20 ASSESSMENT — MIFFLIN-ST. JEOR: SCORE: 1500.87
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
## 2020-02-21 ENCOUNTER — TELEPHONE (OUTPATIENT)
Dept: FAMILY MEDICINE | Facility: CLINIC | Age: 75
End: 2020-02-21

## 2020-02-21 DIAGNOSIS — E11.21 TYPE 2 DIABETES MELLITUS WITH DIABETIC NEPHROPATHY, WITHOUT LONG-TERM CURRENT USE OF INSULIN (H): ICD-10-CM

## 2020-02-21 NOTE — TELEPHONE ENCOUNTER
Pharmacy is requesting a new Rx for [employer] premium test strips be sent to them. #100 BID or UD.medicare part D requires Dx code, Qty, and DS/SIG. Please advise asap. Thanks!

## 2020-04-15 DIAGNOSIS — G89.4 CHRONIC PAIN SYNDROME: ICD-10-CM

## 2020-04-15 NOTE — TELEPHONE ENCOUNTER
Requested Prescriptions   Pending Prescriptions Disp Refills     traMADol (ULTRAM) 50 MG tablet 180 tablet 0     Sig: TAKE 2 TABLET BY MOUTH EVERY 8 HOURS UP TO A MAX OF 6 TABLETS PER DAY   Last Written Prescription Date:  3-18-20  Last Fill Quantity: 180,  # refills: 0   Last office visit: 2/20/2020 with prescribing provider:  2-20-20   Future Office Visit:      There is no refill protocol information for this order

## 2020-04-16 RX ORDER — TRAMADOL HYDROCHLORIDE 50 MG/1
TABLET ORAL
Qty: 180 TABLET | Refills: 0 | Status: SHIPPED | OUTPATIENT
Start: 2020-04-16 | End: 2020-05-15

## 2020-04-16 NOTE — TELEPHONE ENCOUNTER
One time refill given. PDMP accessed with no evidence of misuse.    NIEVES Joseph on 4/16/2020 at 8:37 AM

## 2020-05-14 DIAGNOSIS — G89.4 CHRONIC PAIN SYNDROME: ICD-10-CM

## 2020-05-14 NOTE — TELEPHONE ENCOUNTER
Requested Prescriptions   Pending Prescriptions Disp Refills     traMADol (ULTRAM) 50 MG tablet 180 tablet 0     Sig: TAKE 2 TABLET BY MOUTH EVERY 8 HOURS UP TO A MAX OF 6 TABLETS PER DAY  tramadol      Last Written Prescription Date:  4/16/20  Last Fill Quantity: 180,   # refills: 0  Last Office Visit: 2/20/20 [place]  Future Office visit:       Routing refill request to provider for review/approval because:  Drug not on the [facility] or [facility] refill protocol or controlled substance       There is no refill protocol information for this order

## 2020-05-14 NOTE — TELEPHONE ENCOUNTER
Ultram      Last Written Prescription Date:  4/16/20  Last Fill Quantity: 180,   # refills: 0  Last Office Visit: 2/20/20  Future Office visit:       Routing refill request to provider for review/approval because:  Drug not on the [facility], [facility] or [facility] refill protocol or controlled substance    YUAN Jackson, RN

## 2020-05-15 RX ORDER — TRAMADOL HYDROCHLORIDE 50 MG/1
TABLET ORAL
Qty: 180 TABLET | Refills: 0 | Status: SHIPPED | OUTPATIENT
Start: 2020-05-15 | End: 2020-06-16

## 2020-06-14 DIAGNOSIS — G89.4 CHRONIC PAIN SYNDROME: ICD-10-CM

## 2020-06-15 NOTE — TELEPHONE ENCOUNTER
Routing refill request to provider for review/approval because:  Drug not on the [facility] refill protocol     Last Written Prescription Date:  5-15-20  Last Fill Quantity: 180,  # refills: 0   Last office visit: 2/20/2020 with prescribing provider:  Dr Chandra   Future Office Visit:

## 2020-06-16 RX ORDER — TRAMADOL HYDROCHLORIDE 50 MG/1
TABLET ORAL
Qty: 180 TABLET | Refills: 0 | Status: SHIPPED | OUTPATIENT
Start: 2020-06-16 | End: 2020-07-14

## 2020-06-24 DIAGNOSIS — E78.5 HYPERLIPIDEMIA LDL GOAL <100: ICD-10-CM

## 2020-06-24 DIAGNOSIS — I10 HYPERTENSION GOAL BP (BLOOD PRESSURE) < 140/90: ICD-10-CM

## 2020-06-26 RX ORDER — LISINOPRIL 20 MG/1
TABLET ORAL
Qty: 30 TABLET | Refills: 0 | Status: SHIPPED | OUTPATIENT
Start: 2020-06-26 | End: 2020-07-22

## 2020-06-26 RX ORDER — ROSUVASTATIN CALCIUM 40 MG/1
TABLET, COATED ORAL
Qty: 30 TABLET | Refills: 0 | Status: SHIPPED | OUTPATIENT
Start: 2020-06-26 | End: 2020-07-22

## 2020-06-26 NOTE — TELEPHONE ENCOUNTER
Routing refill request to provider for review/approval because:  Labs not current:  LDL & K+  Lab out of range: Creatinine    LDL Cholesterol Calculated   Date Value Ref Range Status   08/13/2018 61 <100 mg/dL Final     Comment:     Desirable:       <100 mg/dl     Creatinine   Date Value Ref Range Status   07/02/2019 1.36 (H) 0.52 - 1.04 mg/dL Final     Potassium   Date Value Ref Range Status   05/21/2019 4.3 3.4 - 5.3 mmol/L Final     Last Written Prescription Date:  7/2/19  Last Fill Quantity: 90,  # refills: 3      Last office visit: 2/20/2020 with prescribing provider:  Dr. Chandra with the following plan: Return in about 3 months (around 5/20/2020) for Diabetes Follow Up with a HgbA1C prior to visit.     Future Office Visit:  None    Pended for 30 days with appointment reminder.    Fabiana Ignacio RN BSN

## 2020-07-14 DIAGNOSIS — G89.4 CHRONIC PAIN SYNDROME: ICD-10-CM

## 2020-07-14 RX ORDER — TRAMADOL HYDROCHLORIDE 50 MG/1
TABLET ORAL
Qty: 180 TABLET | Refills: 0 | Status: SHIPPED | OUTPATIENT
Start: 2020-07-14 | End: 2020-08-17

## 2020-07-14 NOTE — TELEPHONE ENCOUNTER
Routing refill request to provider for review/approval because:  Drug not on the [facility] refill protocol     Last Written Prescription Date:  6-16-20  Last Fill Quantity: 180,  # refills: 0   Last office visit: 2/20/2020 with prescribing provider:  Dr Chandra   Future Office Visit:

## 2020-07-22 ENCOUNTER — OFFICE VISIT (OUTPATIENT)
Dept: FAMILY MEDICINE | Facility: CLINIC | Age: 75
End: 2020-07-22
Payer: COMMERCIAL

## 2020-07-22 VITALS
RESPIRATION RATE: 18 BRPM | OXYGEN SATURATION: 96 % | DIASTOLIC BLOOD PRESSURE: 68 MMHG | HEART RATE: 109 BPM | SYSTOLIC BLOOD PRESSURE: 118 MMHG | BODY MASS INDEX: 31.19 KG/M2 | TEMPERATURE: 98.5 F | WEIGHT: 208.2 LBS

## 2020-07-22 DIAGNOSIS — I10 HYPERTENSION GOAL BP (BLOOD PRESSURE) < 140/90: ICD-10-CM

## 2020-07-22 DIAGNOSIS — I73.9 PAD (PERIPHERAL ARTERY DISEASE) (H): ICD-10-CM

## 2020-07-22 DIAGNOSIS — E11.65 TYPE 2 DIABETES MELLITUS WITH HYPERGLYCEMIA, WITHOUT LONG-TERM CURRENT USE OF INSULIN (H): Primary | ICD-10-CM

## 2020-07-22 DIAGNOSIS — E78.5 HYPERLIPIDEMIA LDL GOAL <100: ICD-10-CM

## 2020-07-22 DIAGNOSIS — F10.21 ALCOHOL DEPENDENCE IN REMISSION (H): ICD-10-CM

## 2020-07-22 LAB — HBA1C MFR BLD: 7.4 % (ref 0–5.6)

## 2020-07-22 PROCEDURE — 99214 OFFICE O/P EST MOD 30 MIN: CPT | Performed by: FAMILY MEDICINE

## 2020-07-22 PROCEDURE — 83036 HEMOGLOBIN GLYCOSYLATED A1C: CPT | Performed by: FAMILY MEDICINE

## 2020-07-22 PROCEDURE — 84443 ASSAY THYROID STIM HORMONE: CPT | Performed by: FAMILY MEDICINE

## 2020-07-22 PROCEDURE — 80053 COMPREHEN METABOLIC PANEL: CPT | Performed by: FAMILY MEDICINE

## 2020-07-22 PROCEDURE — 82043 UR ALBUMIN QUANTITATIVE: CPT | Performed by: FAMILY MEDICINE

## 2020-07-22 PROCEDURE — 80061 LIPID PANEL: CPT | Performed by: FAMILY MEDICINE

## 2020-07-22 PROCEDURE — 36415 COLL VENOUS BLD VENIPUNCTURE: CPT | Performed by: FAMILY MEDICINE

## 2020-07-22 RX ORDER — LISINOPRIL 20 MG/1
20 TABLET ORAL DAILY
Qty: 90 TABLET | Refills: 3 | Status: SHIPPED | OUTPATIENT
Start: 2020-07-22 | End: 2021-07-17

## 2020-07-22 RX ORDER — ROSUVASTATIN CALCIUM 40 MG/1
40 TABLET, COATED ORAL DAILY
Qty: 90 TABLET | Refills: 3 | Status: SHIPPED | OUTPATIENT
Start: 2020-07-22 | End: 2021-07-17

## 2020-07-22 RX ORDER — METOPROLOL SUCCINATE 50 MG/1
75 TABLET, EXTENDED RELEASE ORAL DAILY
Qty: 135 TABLET | Refills: 5 | Status: SHIPPED | OUTPATIENT
Start: 2020-07-22 | End: 2021-08-23

## 2020-07-22 NOTE — LETTER
July 30, 2020      Anamaria Parra  [address] [place] [address]        Dear ,    We are writing to inform you of your test results.    Your recent labs showed;       Thyroid function tests were normal.   Cholesterol remains well controlled.   Complete Metabolic Panel (panel that checks liver function, kidney function and electrolytes) was normal except that your kidney function has declined compared to a year ago, now stage 4 chronic kidney disease.   I recommend that you keep well hydrated daily, will repeat labs in 2 weeks and refer you to Nephrology.   You can call [facility] [phone] to schedule an appointment.     Resulted Orders   Hemoglobin A1c   Result Value Ref Range    Hemoglobin A1C 7.4 (H) 0 - 5.6 %      Comment:      Normal <5.7% Prediabetes 5.7-6.4%  Diabetes 6.5% or higher - adopted from ADA   consensus guidelines.     Lipid Profile   Result Value Ref Range    Cholesterol 126 <200 mg/dL    Triglycerides 137 <150 mg/dL      Comment:      Fasting specimen    HDL Cholesterol 43 (L) >49 mg/dL    LDL Cholesterol Calculated 56 <100 mg/dL      Comment:      Desirable:       <100 mg/dl    Non HDL Cholesterol 83 <130 mg/dL   TSH with free T4 reflex   Result Value Ref Range    TSH 0.51 0.40 - 4.00 mU/L   Albumin Random Urine Quantitative with Creat Ratio   Result Value Ref Range    Creatinine Urine 23 mg/dL    Albumin Urine mg/L 27 mg/L    Albumin Urine mg/g Cr 119.65 (H) 0 - 25 mg/g Cr   Comprehensive metabolic panel   Result Value Ref Range    Sodium 138 133 - 144 mmol/L    Potassium 4.1 3.4 - 5.3 mmol/L    Chloride 102 94 - 109 mmol/L    Carbon Dioxide 25 20 - 32 mmol/L    Anion Gap 11 3 - 14 mmol/L    Glucose 119 (H) 70 - 99 mg/dL      Comment:      Fasting specimen    Urea Nitrogen 28 7 - 30 mg/dL    Creatinine 1.75 (H) 0.52 - 1.04 mg/dL    GFR Estimate 28 (L) >60 mL/min/[1.73_m2]      Comment:      Non  GFR Calc  Starting 12/18/2018, serum creatinine based  estimated GFR (eGFR) will be   calculated using the Chronic Kidney Disease Epidemiology Collaboration   (CKD-EPI) equation.      GFR Estimate If Black 32 (L) >60 mL/min/[1.73_m2]      Comment:       GFR Calc  Starting 12/18/2018, serum creatinine based estimated GFR (eGFR) will be   calculated using the Chronic Kidney Disease Epidemiology Collaboration   (CKD-EPI) equation.      Calcium 8.9 8.5 - 10.1 mg/dL    Bilirubin Total 0.5 0.2 - 1.3 mg/dL    Albumin 3.8 3.4 - 5.0 g/dL    Protein Total 7.2 6.8 - 8.8 g/dL    Alkaline Phosphatase 70 40 - 150 U/L    ALT 27 0 - 50 U/L    AST 14 0 - 45 U/L       If you have any questions or concerns, please call the clinic at the number listed above.       Sincerely,        Margaret Chandra MD/kriso

## 2020-07-22 NOTE — PROGRESS NOTES
Subjective     Anamaria Parra is a 75 year old female who presents to clinic today for the following health issues:    HPI       Diabetes Follow-up    How often are you checking your blood sugar? Two times daily  Blood sugar testing frequency justification:  Patient modifying lifestyle changes (diet, exercise) with blood sugars  What time of day are you checking your blood sugars (select all that apply)?  in the morning- fasting and then mid day   Have you had any blood sugars above 200?  No  Have you had any blood sugars below 70?  No    What symptoms do you notice when your blood sugar is low?  None    What concerns do you have today about your diabetes? None     Do you have any of these symptoms? (Select all that apply)  No numbness or tingling in feet.  No redness, sores or blisters on feet.  No complaints of excessive thirst.  No reports of blurry vision.  No significant changes to weight.    Have you had a diabetic eye exam in the last 12 months? No       Would like to discuss new blood sugar meter and strips.   Her insurance will cover any machine that is a one touch meter.     HYPERLIPIDEMIA - Patient has a long history of significant Hyperlipidemia requiring medication- Crestor 40 mg/day for treatment with recent good control. Patient reports no problems or side effects with the medication.   Last lipid panel-  Recent Labs   Lab Test 08/13/18  0854 08/03/17  0958  06/23/15  1049 05/22/14  0912   CHOL 134 121   < > 139 137   HDL 43* 46*   < > 51 44*   LDL 61 43   < > 67 75   TRIG 149 159*   < > 105 92   CHOLHDLRATIO  --   --   --  2.7 3.1    < > = values in this interval not displayed.         HYPERTENSION - Patient has longstanding history of HTN , currently denies any symptoms referable to elevated blood pressure. Specifically denies chest pain, palpitations, dyspnea, orthopnea, PND or peripheral edema. Blood pressure readings have been in normal range. Current medication regimen is as listed below.  Patient denies any side effects of medication.   Blood pressure is elevated in the clinic today- previously normal. States that she was anxious about getting to the clinic today in the middle of the COVID-19 pandemic .    History of left leg ulcer and PAD s/p stent placement- stable. Uses compression stockings.         BP Readings from Last 2 Encounters:   07/22/20 (!) 155/75   02/20/20 120/76     Hemoglobin A1C (%)   Date Value   07/22/2020 7.4 (H)   02/20/2020 7.5 (H)     LDL Cholesterol Calculated (mg/dL)   Date Value   08/13/2018 61   08/03/2017 43         How many servings of fruits and vegetables do you eat daily?  4 or more    On average, how many sweetened beverages do you drink each day (Examples: soda, juice, sweet tea, etc.  Do NOT count diet or artificially sweetened beverages)?   0    How many days per week do you exercise enough to make your heart beat faster? 2x/ day 3-4 minutes/ times    How many minutes a day do you exercise enough to make your heart beat faster?     How many days per week do you miss taking your medication? 0        Patient Active Problem List   Diagnosis     Onychomycosis due to dermatophyte     Psoriasis     PAD (peripheral artery disease) (H)     Carotid stenosis     Spasmodic torticollis     CKD (chronic kidney disease) stage 3, GFR 30-59 ml/min (H)     Hyperthyroidism     Hyperlipidemia LDL goal <100     Peripheral neuropathy     ACP (advance care planning)     Partial tear of rotator cuff     AC (acromioclavicular) joint arthritis - right     Shoulder impingement - right     Osteoarthritis of shoulder - right     Ovarian cyst     Morbid obesity due to excess calories (H)     Chronic pain syndrome     Benign essential hypertension     Coronary artery disease involving autologous artery coronary bypass graft without angina pectoris     Type 2 diabetes mellitus with diabetic nephropathy, without long-term current use of insulin (H)     Gastroesophageal reflux disease without  esophagitis     S/P coronary artery stent placement     Alcohol dependence in remission (H)     Past Surgical History:   Procedure Laterality Date     ANGIOGRAM  02    with 2 stents     ANGIOGRAM  04     ANGIOGRAM  2017, 2 stents in RCA    at [facility] ANESTH,SURGERY OF SHOULDER  07    left shoulder arthroplasty     C HAND/FINGER SURGERY UNLISTED      right thumb deep lac repair     CLOSED RX CLAVICLE FRACTURE       CORONARY ARTERY BYPASS  09     TONSILLECTOMY & ADENOIDECTOMY         Social History     Tobacco Use     Smoking status: Former Smoker     Years: 30.00     Types: Cigarettes     Start date: 1960     Last attempt to quit: 1990     Years since quittin.5     Smokeless tobacco: Never Used   Substance Use Topics     Alcohol use: No     Alcohol/week: 0.0 standard drinks     Comment: history of abuse      Family History   Problem Relation Age of Onset     Hypertension Mother      Cerebrovascular Disease Mother      Cardiovascular Father      C.A.D. Brother      Diabetes Brother      Hypertension Brother      Cardiovascular Brother          Current Outpatient Medications   Medication Sig Dispense Refill     aspirin 81 MG tablet Take 81 mg by mouth daily       B-12 OR 1000 MCG DAILY       blood glucose (NO BRAND SPECIFIED) lancets standard Use to test blood sugar 3 times daily or as directed. 100 each 3     blood glucose (NO BRAND SPECIFIED) test strip Use to test blood sugar 2 times daily or as directed. 100 each 3     blood glucose monitoring (NO BRAND SPECIFIED) meter device kit Use to test blood sugar 2 times daily or as directed. 1 kit 0     FISH OIL 1000 MG OR CAPS 2 TABLETS DAILY       FOLIC ACID OR 3000 MCG DAILY       furosemide (LASIX) 40 MG tablet Take 1 tablet (40 mg) by mouth daily 90 tablet 4     glipiZIDE (GLUCOTROL XL) 5 MG 24 hr tablet TAKE 1 TABLET(5 MG) BY MOUTH DAILY WITH BREAKFAST 90 tablet 3     lisinopril (ZESTRIL) 20 MG tablet Take 1 tablet (20 mg) by  mouth daily 90 tablet 3     metFORMIN (GLUCOPHAGE) 500 MG tablet Take 2 tablets (1,000 mg) by mouth 2 times daily (with meals) 360 tablet 3     metoprolol succinate ER (TOPROL-XL) 50 MG 24 hr tablet Take 1.5 tablets (75 mg) by mouth daily 135 tablet 5     MULTI-VITAMIN OR TABS 1 TABLET DAILY       nitroGLYcerin (NITROSTAT) 0.4 MG sublingual tablet Place 1 tablet (0.4 mg) under the tongue every 5 minutes as needed up to 3 tablets per episode. 60 tablet 1     omeprazole (PRILOSEC) 40 MG DR capsule TAKE 1 CAPSULE(40 MG) BY MOUTH EVERY DAY 30 TO 60 MINUTES BEFORE A MEAL 90 capsule 1     rosuvastatin (CRESTOR) 40 MG tablet Take 1 tablet (40 mg) by mouth daily 90 tablet 3     STOOL SOFTENER OR None Entered       traMADol (ULTRAM) 50 MG tablet TAKE 2 TABLETS BY MOUTH EVERY 8 HOURS. MAX OF 6 TABLETS PER  tablet 0     VITAMIN D 1000 UNIT OR CAPS 1 CAPSULE DAILY       Allergies   Allergen Reactions     Dilantin [Phenytoin]      rash       Reviewed and updated as needed this visit by Provider         Review of Systems   Constitutional, HEENT, cardiovascular, pulmonary, gi and gu systems are negative, except as otherwise noted.      Objective    BP (!) 155/75   Pulse 109   Temp 98.5  F (36.9  C) (Tympanic)   Resp 18   Wt 94.4 kg (208 lb 3.2 oz)   SpO2 96%   Breastfeeding No   BMI 31.19 kg/m    Body mass index is 31.19 kg/m .  Physical Exam   GENERAL: healthy, alert and no distress  RESP: lungs clear to auscultation - no rales, rhonchi or wheezes  CV: regular rate and rhythm, normal S1 S2, no S3 or S4, no murmur, click or rub, no peripheral edema and peripheral pulses strong  Diabetic foot exam: normal DP and PT pulses, no trophic changes or ulcerative lesions, normal sensory exam, DP reduced bilateral, PT reduced bilateral, venous stasis dermatitis noted, dry cracking heels and nail exam onychomycosis of the toenails    Diagnostic Test Results:  Labs reviewed in Epic  Results for orders placed or performed in  visit on 07/22/20   Hemoglobin A1c     Status: Abnormal   Result Value Ref Range    Hemoglobin A1C 7.4 (H) 0 - 5.6 %             Assessment & Plan     Anamaria was seen today for diabetes.    Diagnoses and all orders for this visit:    Type 2 diabetes mellitus with hyperglycemia, without long-term current use of insulin (H), controlled. A1C goal < 8.0  -     Hemoglobin A1c  -     blood glucose monitoring (NO BRAND SPECIFIED) meter device kit; Use to test blood sugar 2 times daily or as directed.  -     blood glucose (NO BRAND SPECIFIED) test strip; Use to test blood sugar 2 times daily or as directed.  -     blood glucose (NO BRAND SPECIFIED) lancets standard; Use to test blood sugar 3 times daily or as directed.  -     Refill: metFORMIN (GLUCOPHAGE) 500 MG tablet; Take 2 tablets (1,000 mg) by mouth 2 times daily (with meals)  -     TSH with free T4 reflex  -     Albumin Random Urine Quantitative with Creat Ratio  -     Comprehensive metabolic panel  -     OPHTHALMOLOGY ADULT REFERRAL    Hypertension goal BP (blood pressure) < 140/90, elevated today       -  Repeat BP at the end of the visit was within goal.          Continue current management.  -    Refill:  lisinopril (ZESTRIL) 20 MG tablet; Take 1 tablet (20 mg) by mouth daily  -     Refill: metoprolol succinate ER (TOPROL-XL) 50 MG 24 hr tablet; Take 1.5 tablets (75 mg) by mouth daily    Hyperlipidemia LDL goal <100  -     Refill: rosuvastatin (CRESTOR) 40 MG tablet; Take 1 tablet (40 mg) by mouth daily  -     Lipid Profile    PAD (peripheral artery disease) (H)  -     US TRUMAN Doppler No Exercise; Future    Alcohol dependence in remission (H)  States that she has not had a drink in many years. Congratulated patient.         Return in about 4 months (around 11/22/2020) for Diabetes Follow Up with a HgbA1C prior to visit.    Margaret Chandra MD  [facility]

## 2020-07-23 LAB
ALBUMIN SERPL-MCNC: 3.8 G/DL (ref 3.4–5)
ALP SERPL-CCNC: 70 U/L (ref 40–150)
ALT SERPL W P-5'-P-CCNC: 27 U/L (ref 0–50)
ANION GAP SERPL CALCULATED.3IONS-SCNC: 11 MMOL/L (ref 3–14)
AST SERPL W P-5'-P-CCNC: 14 U/L (ref 0–45)
BILIRUB SERPL-MCNC: 0.5 MG/DL (ref 0.2–1.3)
BUN SERPL-MCNC: 28 MG/DL (ref 7–30)
CALCIUM SERPL-MCNC: 8.9 MG/DL (ref 8.5–10.1)
CHLORIDE SERPL-SCNC: 102 MMOL/L (ref 94–109)
CHOLEST SERPL-MCNC: 126 MG/DL
CO2 SERPL-SCNC: 25 MMOL/L (ref 20–32)
CREAT SERPL-MCNC: 1.75 MG/DL (ref 0.52–1.04)
CREAT UR-MCNC: 23 MG/DL
GFR SERPL CREATININE-BSD FRML MDRD: 28 ML/MIN/{1.73_M2}
GLUCOSE SERPL-MCNC: 119 MG/DL (ref 70–99)
HDLC SERPL-MCNC: 43 MG/DL
LDLC SERPL CALC-MCNC: 56 MG/DL
MICROALBUMIN UR-MCNC: 27 MG/L
MICROALBUMIN/CREAT UR: 119.65 MG/G CR (ref 0–25)
NONHDLC SERPL-MCNC: 83 MG/DL
POTASSIUM SERPL-SCNC: 4.1 MMOL/L (ref 3.4–5.3)
PROT SERPL-MCNC: 7.2 G/DL (ref 6.8–8.8)
SODIUM SERPL-SCNC: 138 MMOL/L (ref 133–144)
TRIGL SERPL-MCNC: 137 MG/DL
TSH SERPL DL<=0.005 MIU/L-ACNC: 0.51 MU/L (ref 0.4–4)

## 2020-07-28 DIAGNOSIS — N18.4 CKD (CHRONIC KIDNEY DISEASE) STAGE 4, GFR 15-29 ML/MIN (H): Primary | ICD-10-CM

## 2020-07-30 ENCOUNTER — TELEPHONE (OUTPATIENT)
Dept: NEPHROLOGY | Facility: CLINIC | Age: 75
End: 2020-07-30

## 2020-07-30 NOTE — TELEPHONE ENCOUNTER
[facility] Call Center    Phone Message    May a detailed message be left on voicemail: yes     Reason for Call: Appointment Intake    Referring Provider Name: Nay Desai  Diagnosis and/or Symptoms: CKD stage 4 - no mychart for virtual visit. Please review    Action Taken: Message routed to:  [facility] ([facility]): Nephrology    Travel Screening: Not Applicable

## 2020-08-06 DIAGNOSIS — N18.30 CKD (CHRONIC KIDNEY DISEASE) STAGE 3, GFR 30-59 ML/MIN (H): ICD-10-CM

## 2020-08-06 DIAGNOSIS — K21.9 GASTROESOPHAGEAL REFLUX DISEASE WITHOUT ESOPHAGITIS: ICD-10-CM

## 2020-08-06 NOTE — TELEPHONE ENCOUNTER
"Requested Prescriptions   Pending Prescriptions Disp Refills     furosemide (LASIX) 40 MG tablet 90 tablet 4     Sig: Take 1 tablet (40 mg) by mouth daily   Last Written Prescription Date:  05/06/20  Last Fill Quantity: 90,  # refills: 0   Last office visit: 7/22/2020 with prescribing provider:  MAYITO Chandra   Future Office Visit:              Diuretics (Including Combos) Protocol Failed - 8/6/2020  8:29 AM        Failed - Normal serum creatinine on file in past 12 months     Recent Labs   Lab Test 07/22/20  1056   CR 1.75*              Passed - Blood pressure under 140/90 in past 12 months     BP Readings from Last 3 Encounters:   07/22/20 118/68   02/20/20 120/76   10/03/19 136/74                 Passed - Recent (12 mo) or future (30 days) visit within the authorizing provider's specialty     Patient has had an office visit with the authorizing provider or a provider within the authorizing providers department within the previous 12 mos or has a future within next 30 days. See \"Patient Info\" tab in inbasket, or \"Choose Columns\" in Meds & Orders section of the refill encounter.              Passed - Medication is active on med list        Passed - Patient is age 18 or older        Passed - No active pregancy on record        Passed - Normal serum potassium on file in past 12 months     Recent Labs   Lab Test 07/22/20  1056   POTASSIUM 4.1                    Passed - Normal serum sodium on file in past 12 months     Recent Labs   Lab Test 07/22/20  1056                 Passed - No positive pregnancy test in past 12 months             "

## 2020-08-09 RX ORDER — OMEPRAZOLE 40 MG/1
CAPSULE, DELAYED RELEASE ORAL
Qty: 90 CAPSULE | Refills: 0 | Status: SHIPPED | OUTPATIENT
Start: 2020-08-09 | End: 2020-11-13

## 2020-08-09 NOTE — TELEPHONE ENCOUNTER
"Prescription approved per [facility] Refill Protocol.          Requested Prescriptions   Pending Prescriptions Disp Refills     omeprazole (PRILOSEC) 40 MG DR capsule [Pharmacy Med Name: OMEPRAZOLE 40MG CAPSULES] 90 capsule 0     Sig: TAKE 1 CAPSULE(40 MG) BY MOUTH EVERY DAY 30 TO 60 MINUTES BEFORE A MEAL       PPI Protocol Passed - 8/6/2020  7:56 AM        Passed - Not on Clopidogrel (unless Pantoprazole ordered)        Passed - No diagnosis of osteoporosis on record        Passed - Recent (12 mo) or future (30 days) visit within the authorizing provider's specialty     Patient has had an office visit with the authorizing provider or a provider within the authorizing providers department within the previous 12 mos or has a future within next 30 days. See \"Patient Info\" tab in inbasket, or \"Choose Columns\" in Meds & Orders section of the refill encounter.              Passed - Medication is active on med list        Passed - Patient is age 18 or older        Passed - No active pregnacy on record        Passed - No positive pregnancy test in past 12 months             "

## 2020-08-10 RX ORDER — FUROSEMIDE 40 MG
40 TABLET ORAL DAILY
Qty: 90 TABLET | Refills: 0 | Status: SHIPPED | OUTPATIENT
Start: 2020-08-10 | End: 2020-11-13

## 2020-08-12 DIAGNOSIS — G89.4 CHRONIC PAIN SYNDROME: ICD-10-CM

## 2020-08-14 NOTE — TELEPHONE ENCOUNTER
Last Written Prescription Date:  7/14/2020  Last Fill Quantity: 180,  # refills: 0   Last office visit: 7/22/2020 with prescribing provider:  Dr. Chandra   Future Office Visit: None    Routing refill request to provider for review/approval because:  Drug not on the [facility] refill protocol     Lisy Stokes RN, BSN

## 2020-08-17 RX ORDER — TRAMADOL HYDROCHLORIDE 50 MG/1
TABLET ORAL
Qty: 180 TABLET | Refills: 0 | Status: SHIPPED | OUTPATIENT
Start: 2020-08-17 | End: 2020-09-17

## 2020-09-16 DIAGNOSIS — G89.4 CHRONIC PAIN SYNDROME: ICD-10-CM

## 2020-09-16 DIAGNOSIS — I10 HYPERTENSION GOAL BP (BLOOD PRESSURE) < 140/90: ICD-10-CM

## 2020-09-16 RX ORDER — METOPROLOL SUCCINATE 50 MG/1
75 TABLET, EXTENDED RELEASE ORAL DAILY
Qty: 135 TABLET | Refills: 5 | OUTPATIENT
Start: 2020-09-16

## 2020-09-16 NOTE — TELEPHONE ENCOUNTER
"Pt has refills on file from script sent on 7/22/20      Requested Prescriptions   Pending Prescriptions Disp Refills     metoprolol succinate ER (TOPROL-XL) 50 MG 24 hr tablet 135 tablet 5     Sig: Take 1.5 tablets (75 mg) by mouth daily       Beta-Blockers Protocol Passed - 9/16/2020  7:48 AM        Passed - Blood pressure under 140/90 in past 12 months     BP Readings from Last 3 Encounters:   07/22/20 118/68   02/20/20 120/76   10/03/19 136/74                 Passed - Patient is age 6 or older        Passed - Recent (12 mo) or future (30 days) visit within the authorizing provider's specialty     Patient has had an office visit with the authorizing provider or a provider within the authorizing providers department within the previous 12 mos or has a future within next 30 days. See \"Patient Info\" tab in inbasket, or \"Choose Columns\" in Meds & Orders section of the refill encounter.              Passed - Medication is active on med list             "

## 2020-09-16 NOTE — TELEPHONE ENCOUNTER
Requested Prescriptions   Pending Prescriptions Disp Refills     metoprolol succinate ER (TOPROL-XL) 50 MG 24 hr tablet 135 tablet 5     Sig: Take 1.5 tablets (75 mg) by mouth daily   Last Written Prescription Date:  6-24-20  Last Fill Quantity: 135,  # refills: 5   Last office visit: 7/22/2020 with prescribing provider:  7-22-20   Future Office Visit:            There is no refill protocol information for this order

## 2020-09-16 NOTE — TELEPHONE ENCOUNTER
Routing refill request to provider for review/approval because:  Drug not on the [facility] refill protocol     traMADol (ULTRAM) 50 MG tablet  Last Written Prescription Date:  8/17/20  Last Fill Quantity: 180,  # refills: 0   Last office visit: 7/22/2020 with prescribing provider:  asa   Future Office Visit:    Due 11/22/20

## 2020-09-17 RX ORDER — TRAMADOL HYDROCHLORIDE 50 MG/1
TABLET ORAL
Qty: 180 TABLET | Refills: 0 | Status: SHIPPED | OUTPATIENT
Start: 2020-09-17 | End: 2020-10-20

## 2020-10-19 DIAGNOSIS — G89.4 CHRONIC PAIN SYNDROME: ICD-10-CM

## 2020-10-19 NOTE — TELEPHONE ENCOUNTER
Routing refill request to provider for review/approval because:  Drug not on the [facility] refill protocol     Zina NOVAKN, RN

## 2020-10-20 RX ORDER — TRAMADOL HYDROCHLORIDE 50 MG/1
TABLET ORAL
Qty: 180 TABLET | Refills: 0 | Status: SHIPPED | OUTPATIENT
Start: 2020-10-20 | End: 2020-11-17

## 2020-10-20 NOTE — TELEPHONE ENCOUNTER
Please remind Zahida that she is due for her Annual Wellness visit and due to renew her Controlled Substance Agreement for her Tramadol as well. Please help her schedule. Thanks   Rx sent

## 2020-11-12 NOTE — PROGRESS NOTES
"  SUBJECTIVE:   Anamaria Parra is a 75 year old female who presents for Preventive Visit.      Patient has been advised of split billing requirements and indicates understanding: Yes  Are you in the first 12 months of your Medicare Part B coverage?  No    Physical Health:    In general, how would you rate your overall physical health? good    Outside of work, how many days during the week do you exercise? 6-7 days/week    Outside of work, approximately how many minutes a day do you exercise?less than 15 minutes    If you drink alcohol do you typically have >3 drinks per day or >7 drinks per week? No    Do you usually eat at least 4 servings of fruit and vegetables a day, include whole grains & fiber and avoid regularly eating high fat or \"junk\" foods? NO    Do you have any problems taking medications regularly?  No    Do you have any side effects from medications? none    Needs assistance for the following daily activities: no assistance needed    Which of the following safety concerns are present in your home?  none identified     Hearing impairment: No    In the past 6 months, have you been bothered by leaking of urine? no    Mental Health:    In general, how would you rate your overall mental or emotional health? good  PHQ-2 Score:    PHQ-2 Score:     PHQ-2 ( 1999 Pfizer) 11/12/2020 2/20/2020   Q1: Little interest or pleasure in doing things 0 0   Q2: Feeling down, depressed or hopeless 0 0   PHQ-2 Score 0 0         Do you feel safe in your environment? Yes    Have you ever done Advance Care Planning? (For example, a Health Directive, POLST, or a discussion with a medical provider or your loved ones about your wishes): No, advance care planning information given to patient to review.  Patient declined advance care planning discussion at this time.    Additional concerns to address?  YES-Med refills and changed how she takes Omeprazole.    States that she is currently taking Omeprazole every other day and seems " to be helping her GERD symptoms. Requesting a refill.     Diabetes Follow-up    How often are you checking your blood sugar? A few times a week  What time of day are you checking your blood sugars (select all that apply)?  Before meals  Have you had any blood sugars above 200?  No  Have you had any blood sugars below 70?  No    What symptoms do you notice when your blood sugar is low?  None    What concerns do you have today about your diabetes? None     Do you have any of these symptoms? (Select all that apply)  No numbness or tingling in feet.  No redness, sores or blisters on feet.  No complaints of excessive thirst.  No reports of blurry vision.  No significant changes to weight.    Have you had a diabetic eye exam in the last 12 months? No        BP Readings from Last 2 Encounters:   20 127/78   20 118/68     Hemoglobin A1C (%)   Date Value   2020 7.4 (H)   2020 7.4 (H)     LDL Cholesterol Calculated (mg/dL)   Date Value   2020 56   2018 61         Hypertension Follow-up      Do you check your blood pressure regularly outside of the clinic? No     Are you following a low salt diet? Yes    Are your blood pressures ever more than 140 on the top number (systolic) OR more   than 90 on the bottom number (diastolic), for example 140/90? Yes    HEALTH CARE MAINTENANCE: Due to renew/sign Controlled substance agreement, UDS, Flu shot. Declines DEXA and Hep C screening.    Fall risk:  Fallen 2 or more times in the past year?: No  Any fall with injury in the past year?: No    Cognitive Screenin) Repeat 3 items (Leader, Season, Table)    2) Clock draw: NORMAL  3) 3 item recall: Recalls 2 objects   Results: NORMAL clock, 1-2 items recalled: COGNITIVE IMPAIRMENT LESS LIKELY    Mini-CogTM Copyright S Carolyn. Licensed by the author for use in [facility]; reprinted with permission (tyler@.[facility]). All rights reserved.      Do you have sleep apnea, excessive snoring or  daytime drowsiness?: yes        PROBLEMS TO ADD ON...    Reviewed and updated as needed this visit by clinical staff  Tobacco  Allergies  Meds              Reviewed and updated as needed this visit by Provider                Social History     Tobacco Use     Smoking status: Former Smoker     Years: 30.00     Types: Cigarettes     Start date: 1960     Quit date: 1990     Years since quittin.8     Smokeless tobacco: Never Used   Substance Use Topics     Alcohol use: No     Alcohol/week: 0.0 standard drinks     Comment: history of abuse                            Current providers sharing in care for this patient include:   Patient Care Team:  Margaret Chandra MD as PCP - General (Family Practice)  Margaret Chandra MD as Assigned PCP    The following health maintenance items are reviewed in Epic and correct as of today:  Health Maintenance   Topic Date Due     DEXA  1945     EYE EXAM  1945     URINE DRUG SCREEN  10/03/2020     A1C  2021     BMP  2021     CMP  2021     LIPID  2021     MICROALBUMIN  2021     TSH W/FREE T4 REFLEX  2021     DIABETIC FOOT EXAM  2021     DTAP/TDAP/TD IMMUNIZATION (5 - Td) 2021     MEDICARE ANNUAL WELLNESS VISIT  2021     FALL RISK ASSESSMENT  2021     ADVANCE CARE PLANNING  2025     HEPATITIS C SCREENING  Completed     PHQ-2  Completed     INFLUENZA VACCINE  Completed     Pneumococcal Vaccine: 65+ Years  Completed     ZOSTER IMMUNIZATION  Completed     Pneumococcal Vaccine: Pediatrics (0 to 5 Years) and At-Risk Patients (6 to 64 Years)  Aged Out     IPV IMMUNIZATION  Aged Out     MENINGITIS IMMUNIZATION  Aged Out     Lab work is in process  Labs reviewed in EPIC  BP Readings from Last 3 Encounters:   20 127/78   20 118/68   20 120/76    Wt Readings from Last 3 Encounters:   20 94.3 kg (208 lb)   20 94.4 kg (208 lb 3.2 oz)   20 95.4 kg (210 lb 6.4 oz)                   Patient Active Problem List   Diagnosis     Onychomycosis due to dermatophyte     Psoriasis     PAD (peripheral artery disease) (H)     Carotid stenosis     Spasmodic torticollis     CKD (chronic kidney disease) stage 3, GFR 30-59 ml/min     Hyperthyroidism     Hyperlipidemia LDL goal <100     Peripheral neuropathy     ACP (advance care planning)     Partial tear of rotator cuff     AC (acromioclavicular) joint arthritis - right     Shoulder impingement - right     Osteoarthritis of shoulder - right     Ovarian cyst     Morbid obesity due to excess calories (H)     Chronic pain syndrome     Benign essential hypertension     Coronary artery disease involving autologous artery coronary bypass graft without angina pectoris     Type 2 diabetes mellitus with diabetic nephropathy, without long-term current use of insulin (H)     Gastroesophageal reflux disease without esophagitis     S/P coronary artery stent placement     Alcohol dependence in remission (H)     Past Surgical History:   Procedure Laterality Date     ANGIOGRAM      with 2 stents     ANGIOGRAM  04     ANGIOGRAM  2017, 2 stents in RCA    at [facility] ANESTH,SURGERY OF SHOULDER  07    left shoulder arthroplasty     C HAND/FINGER SURGERY UNLISTED      right thumb deep lac repair     CLOSED RX CLAVICLE FRACTURE       CORONARY ARTERY BYPASS  09     TONSILLECTOMY & ADENOIDECTOMY         Social History     Tobacco Use     Smoking status: Former Smoker     Years: 30.00     Types: Cigarettes     Start date: 1960     Quit date: 1990     Years since quittin.8     Smokeless tobacco: Never Used   Substance Use Topics     Alcohol use: No     Alcohol/week: 0.0 standard drinks     Comment: history of abuse      Family History   Problem Relation Age of Onset     Hypertension Mother      Cerebrovascular Disease Mother      Cardiovascular Father      C.AABELARDO Brother      Diabetes Brother      Hypertension Brother       Cardiovascular Brother          Current Outpatient Medications   Medication Sig Dispense Refill     aspirin 81 MG tablet Take 81 mg by mouth daily       B-12 OR 1000 MCG DAILY       FISH OIL 1000 MG OR CAPS 2 TABLETS DAILY       FOLIC ACID OR 3000 MCG DAILY       furosemide (LASIX) 40 MG tablet Take 1 tablet (40 mg) by mouth daily 90 tablet 3     glipiZIDE (GLUCOTROL XL) 5 MG 24 hr tablet TAKE 1 TABLET(5 MG) BY MOUTH DAILY WITH BREAKFAST 90 tablet 3     lisinopril (ZESTRIL) 20 MG tablet Take 1 tablet (20 mg) by mouth daily 90 tablet 3     metFORMIN (GLUCOPHAGE) 500 MG tablet Take 2 tablets (1,000 mg) by mouth 2 times daily (with meals) 360 tablet 3     metoprolol succinate ER (TOPROL-XL) 50 MG 24 hr tablet Take 1.5 tablets (75 mg) by mouth daily 135 tablet 5     MULTI-VITAMIN OR TABS 1 TABLET DAILY       nitroGLYcerin (NITROSTAT) 0.4 MG sublingual tablet Place 1 tablet (0.4 mg) under the tongue every 5 minutes as needed up to 3 tablets per episode. 60 tablet 1     omeprazole (PRILOSEC) 40 MG DR capsule Take 1 capsule (40 mg) by mouth every other day 90 capsule 0     rosuvastatin (CRESTOR) 40 MG tablet Take 1 tablet (40 mg) by mouth daily 90 tablet 3     STOOL SOFTENER OR None Entered       traMADol (ULTRAM) 50 MG tablet TAKE 2 TABLETS BY MOUTH EVERY 8 HOURS. MAX OF 6 TABLETS PER  tablet 0     VITAMIN D 1000 UNIT OR CAPS 1 CAPSULE DAILY       blood glucose (NO BRAND SPECIFIED) lancets standard Use to test blood sugar 3 times daily or as directed. 100 each 3     blood glucose (NO BRAND SPECIFIED) test strip Use to test blood sugar 2 times daily or as directed. 100 each 3     blood glucose monitoring (NO BRAND SPECIFIED) meter device kit Use to test blood sugar 2 times daily or as directed. 1 kit 0     Allergies   Allergen Reactions     Dilantin [Phenytoin]      rash         ROS:  Constitutional, HEENT, cardiovascular, pulmonary, gi and gu systems are negative, except as otherwise noted.    OBJECTIVE:   BP  "127/78   Pulse 104   Temp 98.2  F (36.8  C) (Tympanic)   Wt 94.3 kg (208 lb)   SpO2 98%   BMI 31.16 kg/m   Estimated body mass index is 31.16 kg/m  as calculated from the following:    Height as of 2/20/20: 1.74 m (5' 8.5\").    Weight as of this encounter: 94.3 kg (208 lb).  EXAM:   GENERAL APPEARANCE: healthy, alert and no distress  EYES: Eyes grossly normal to inspection, PERRL and conjunctivae and sclerae normal  HENT: ear canals and TM's normal, nose and mouth without ulcers or lesions, oropharynx clear and oral mucous membranes moist  NECK: no adenopathy, no asymmetry, masses, or scars and thyroid normal to palpation  RESP: lungs clear to auscultation - no rales, rhonchi or wheezes  BREAST: normal without masses, tenderness or nipple discharge and no palpable axillary masses or adenopathy  CV: regular rate and rhythm, normal S1 S2, no S3 or S4, no murmur, click or rub, no peripheral edema and peripheral pulses strong  ABDOMEN: soft, nontender, no hepatosplenomegaly, no masses and bowel sounds normal  MS: no musculoskeletal defects are noted and gait is age appropriate without ataxia  SKIN: no suspicious lesions or rashes  NEURO: Normal strength and tone, sensory exam grossly normal, mentation intact and speech normal  PSYCH: mentation appears normal and affect normal/bright    Diagnostic Test Results:  Labs reviewed in Epic  Results for orders placed or performed in visit on 11/13/20   HEMOGLOBIN A1C     Status: Abnormal   Result Value Ref Range    Hemoglobin A1C 7.4 (H) 0 - 5.6 %         ASSESSMENT / PLAN:   Anamaria was seen today for physical.    Diagnoses and all orders for this visit:    Encounter for Medicare annual wellness exam    Need for hepatitis C screening test  -     Hepatitis C Screen Reflex to HCV RNA Quant and Genotype    Hypertension goal BP (blood pressure) < 140/90, controlled.  -     Refill: furosemide (LASIX) 40 MG tablet; Take 1 tablet (40 mg) by mouth daily    Type 2 diabetes " "mellitus with hyperglycemia, without long-term current use of insulin (H), controlled. A1C   -     HEMOGLOBIN A1C  -     EYE ADULT REFERRAL; Future  -     Refill: glipiZIDE (GLUCOTROL XL) 5 MG 24 hr tablet; TAKE 1 TABLET(5 MG) BY MOUTH DAILY WITH BREAKFAST  -    Await repeat renal labs- if creatinine remains elevated, consider discontinuing Metformin and consider a SGLT2 inhibitor.    CKD (chronic kidney disease) stage 4, GFR 15-29 ml/min (H)  -     Repeat labs- Creatinine/eGFR today.  -     Noted declining renal function. nephrology referral recently completed. Patient to schedule a visit at her earliest convenience.     Gastroesophageal reflux disease without esophagitis  -     Start: omeprazole (PRILOSEC) 40 MG DR capsule; Take 1 capsule (40 mg) by mouth every other day    Controlled substance agreement signed  -     Drug Abuse Screen Panel 13, Urine (Pain Care Package)    Need for prophylactic vaccination and inoculation against influenza  -     FLUZONE HIGH DOSE 65+  [88836]  -     ADMIN 1st VACCINE        Patient has been advised of split billing requirements and indicates understanding: Yes    COUNSELING:  Reviewed preventive health counseling, as reflected in patient instructions       Regular exercise       Healthy diet/nutrition    Estimated body mass index is 31.16 kg/m  as calculated from the following:    Height as of 2/20/20: 1.74 m (5' 8.5\").    Weight as of this encounter: 94.3 kg (208 lb).    Weight management plan: Discussed healthy diet and exercise guidelines    She reports that she quit smoking about 30 years ago. Her smoking use included cigarettes. She started smoking about 60 years ago. She quit after 30.00 years of use. She has never used smokeless tobacco.    Appropriate preventive services were discussed with this patient, including applicable screening as appropriate for cardiovascular disease, diabetes, osteopenia/osteoporosis, and glaucoma.  As appropriate for age/gender, discussed " screening for colorectal cancer, prostate cancer, breast cancer, and cervical cancer. Checklist reviewing preventive services available has been given to the patient.    Reviewed patients plan of care and provided an AVS. The Basic Care Plan (routine screening as documented in Health Maintenance) for Anamaria meets the Care Plan requirement. This Care Plan has been established and reviewed with the Patient.    Counseling Resources:  ATP IV Guidelines  Pooled Cohorts Equation Calculator  Breast Cancer Risk Calculator  BRCA-Related Cancer Risk Assessment: FHS-7 Tool  FRAX Risk Assessment  ICSI Preventive Guidelines  Dietary Guidelines for Americans, 2010  USDA's MyPlate  ASA Prophylaxis  Lung CA Screening    Follow up in 6 months- med check.   Next Annual Physical due in 11 /2021    Margaret Chandra MD  [facility]

## 2020-11-12 NOTE — PATIENT INSTRUCTIONS
Preventive Health Recommendations    See your health care provider every year to    Review health changes.     Discuss preventive care.      Review your medicines if your doctor has prescribed any.      You no longer need a yearly Pap test unless you've had an abnormal Pap test in the past 10 years. If you have vaginal symptoms, such as bleeding or discharge, be sure to talk with your provider about a Pap test.      Every 1 to 2 years, have a mammogram.  If you are over 69, talk with your health care provider about whether or not you want to continue having screening mammograms.      Every 10 years, have a colonoscopy. Or, have a yearly FIT test (stool test). These exams will check for colon cancer.       Have a cholesterol test every 5 years, or more often if your doctor advises it.       Have a diabetes test (fasting glucose) every three years. If you are at risk for diabetes, you should have this test more often.       At age 65, have a bone density scan (DEXA) to check for osteoporosis (brittle bone disease).    Shots:    Get a flu shot each year.    Get a tetanus shot every 10 years.    Talk to your doctor about your pneumonia vaccines. There are now two you should receive - Pneumovax (PPSV 23) and Prevnar (PCV 13).    Talk to your pharmacist about the shingles vaccine.    Talk to your doctor about the hepatitis B vaccine.    Nutrition:     Eat at least 5 servings of fruits and vegetables each day.      Eat whole-grain bread, whole-wheat pasta and brown rice instead of white grains and rice.      Get adequate about Calcium and Vitamin D.     Lifestyle    Exercise at least 150 minutes a week (30 minutes a day, 5 days a week). This will help you control your weight and prevent disease.      Limit alcohol to one drink per day.      No smoking.       Wear sunscreen to prevent skin cancer.       See your dentist twice a year for an exam and cleaning.      See your eye doctor every 1 to 2 years to screen for  conditions such as glaucoma, macular degeneration, cataracts, etc.    Personalized Prevention Plan  You are due for the preventive services outlined below.  Your care team is available to assist you in scheduling these services.  If you have already completed any of these items, please share that information with your care team to update in your medical record.    Health Maintenance Due   Topic Date Due     Osteoporosis Screening  1945     Eye Exam  1945     Hepatitis C Screening  06/03/1963     Flu Vaccine (1) 09/01/2020     URINE DRUG SCREEN  10/03/2020     A1C Lab  10/22/2020     Discuss Advance Care Planning  10/23/2020     Patient Education   Personalized Prevention Plan  You are due for the preventive services outlined below.  Your care team is available to assist you in scheduling these services.  If you have already completed any of these items, please share that information with your care team to update in your medical record.  Health Maintenance Due   Topic Date Due     Osteoporosis Screening  1945     Eye Exam  1945     Hepatitis C Screening  06/03/1963     Flu Vaccine (1) 09/01/2020     URINE DRUG SCREEN  10/03/2020

## 2020-11-13 ENCOUNTER — OFFICE VISIT (OUTPATIENT)
Dept: FAMILY MEDICINE | Facility: CLINIC | Age: 75
End: 2020-11-13
Payer: COMMERCIAL

## 2020-11-13 VITALS
SYSTOLIC BLOOD PRESSURE: 127 MMHG | BODY MASS INDEX: 31.16 KG/M2 | HEART RATE: 104 BPM | OXYGEN SATURATION: 98 % | WEIGHT: 208 LBS | DIASTOLIC BLOOD PRESSURE: 78 MMHG | TEMPERATURE: 98.2 F

## 2020-11-13 DIAGNOSIS — Z79.899 CONTROLLED SUBSTANCE AGREEMENT SIGNED: ICD-10-CM

## 2020-11-13 DIAGNOSIS — Z23 NEED FOR PROPHYLACTIC VACCINATION AND INOCULATION AGAINST INFLUENZA: ICD-10-CM

## 2020-11-13 DIAGNOSIS — Z00.00 ENCOUNTER FOR MEDICARE ANNUAL WELLNESS EXAM: Primary | ICD-10-CM

## 2020-11-13 DIAGNOSIS — Z11.59 NEED FOR HEPATITIS C SCREENING TEST: ICD-10-CM

## 2020-11-13 DIAGNOSIS — K21.9 GASTROESOPHAGEAL REFLUX DISEASE WITHOUT ESOPHAGITIS: ICD-10-CM

## 2020-11-13 DIAGNOSIS — N18.4 CKD (CHRONIC KIDNEY DISEASE) STAGE 4, GFR 15-29 ML/MIN (H): ICD-10-CM

## 2020-11-13 DIAGNOSIS — I10 HYPERTENSION GOAL BP (BLOOD PRESSURE) < 140/90: ICD-10-CM

## 2020-11-13 DIAGNOSIS — E11.65 TYPE 2 DIABETES MELLITUS WITH HYPERGLYCEMIA, WITHOUT LONG-TERM CURRENT USE OF INSULIN (H): ICD-10-CM

## 2020-11-13 LAB
AMPHETAMINES UR QL: NOT DETECTED NG/ML
BARBITURATES UR QL SCN: NOT DETECTED NG/ML
BENZODIAZ UR QL SCN: NOT DETECTED NG/ML
BUPRENORPHINE UR QL: NOT DETECTED NG/ML
CANNABINOIDS UR QL: NOT DETECTED NG/ML
COCAINE UR QL SCN: NOT DETECTED NG/ML
CREAT SERPL-MCNC: 1.51 MG/DL (ref 0.52–1.04)
D-METHAMPHET UR QL: NOT DETECTED NG/ML
GFR SERPL CREATININE-BSD FRML MDRD: 33 ML/MIN/{1.73_M2}
HBA1C MFR BLD: 7.4 % (ref 0–5.6)
HCV AB SERPL QL IA: NONREACTIVE
METHADONE UR QL SCN: NOT DETECTED NG/ML
OPIATES UR QL SCN: NOT DETECTED NG/ML
OXYCODONE UR QL SCN: NOT DETECTED NG/ML
PCP UR QL SCN: NOT DETECTED NG/ML
PROPOXYPH UR QL: NOT DETECTED NG/ML
TRICYCLICS UR QL SCN: NOT DETECTED NG/ML

## 2020-11-13 PROCEDURE — G0008 ADMIN INFLUENZA VIRUS VAC: HCPCS | Performed by: FAMILY MEDICINE

## 2020-11-13 PROCEDURE — 99214 OFFICE O/P EST MOD 30 MIN: CPT | Mod: 25 | Performed by: FAMILY MEDICINE

## 2020-11-13 PROCEDURE — 80306 DRUG TEST PRSMV INSTRMNT: CPT | Performed by: FAMILY MEDICINE

## 2020-11-13 PROCEDURE — 36415 COLL VENOUS BLD VENIPUNCTURE: CPT | Performed by: FAMILY MEDICINE

## 2020-11-13 PROCEDURE — 83036 HEMOGLOBIN GLYCOSYLATED A1C: CPT | Performed by: FAMILY MEDICINE

## 2020-11-13 PROCEDURE — 82565 ASSAY OF CREATININE: CPT | Performed by: FAMILY MEDICINE

## 2020-11-13 PROCEDURE — 90662 IIV NO PRSV INCREASED AG IM: CPT | Performed by: FAMILY MEDICINE

## 2020-11-13 PROCEDURE — 86803 HEPATITIS C AB TEST: CPT | Performed by: FAMILY MEDICINE

## 2020-11-13 PROCEDURE — 99397 PER PM REEVAL EST PAT 65+ YR: CPT | Mod: 25 | Performed by: FAMILY MEDICINE

## 2020-11-13 RX ORDER — FUROSEMIDE 40 MG
40 TABLET ORAL DAILY
Qty: 90 TABLET | Refills: 3 | Status: SHIPPED | OUTPATIENT
Start: 2020-11-13 | End: 2021-12-03

## 2020-11-13 RX ORDER — OMEPRAZOLE 40 MG/1
40 CAPSULE, DELAYED RELEASE ORAL EVERY OTHER DAY
Qty: 90 CAPSULE | Refills: 0 | Status: SHIPPED | OUTPATIENT
Start: 2020-11-13 | End: 2021-08-23

## 2020-11-13 RX ORDER — GLIPIZIDE 5 MG/1
TABLET, FILM COATED, EXTENDED RELEASE ORAL
Qty: 90 TABLET | Refills: 3 | Status: SHIPPED | OUTPATIENT
Start: 2020-11-13 | End: 2021-12-03

## 2020-11-13 NOTE — LETTER
November 16, 2020      Anamaria Parra  [address] [place] [address]        Dear ,    We are writing to inform you of your test results.    Your recent labs looked good.     Hep C screen was negative.   Urine drug screen was appropriate.   Kidney function remains stable.   Diabetes remains well controlled.   Continue your current medications as prescribed.     Resulted Orders   Hepatitis C Screen Reflex to HCV RNA Quant and Genotype   Result Value Ref Range    Hepatitis C Antibody Nonreactive NR^Nonreactive      Comment:      Assay performance characteristics have not been established for newborns,   infants, and children     HEMOGLOBIN A1C   Result Value Ref Range    Hemoglobin A1C 7.4 (H) 0 - 5.6 %      Comment:      Normal <5.7% Prediabetes 5.7-6.4%  Diabetes 6.5% or higher - adopted from ADA   consensus guidelines.     Creatinine   Result Value Ref Range    Creatinine 1.51 (H) 0.52 - 1.04 mg/dL    GFR Estimate 33 (L) >60 mL/min/[1.73_m2]      Comment:      Non  GFR Calc  Starting 12/18/2018, serum creatinine based estimated GFR (eGFR) will be   calculated using the Chronic Kidney Disease Epidemiology Collaboration   (CKD-EPI) equation.      GFR Estimate If Black 38 (L) >60 mL/min/[1.73_m2]      Comment:       GFR Calc  Starting 12/18/2018, serum creatinine based estimated GFR (eGFR) will be   calculated using the Chronic Kidney Disease Epidemiology Collaboration   (CKD-EPI) equation.     Drug Abuse Screen Panel 13, Urine (Pain Care Package)   Result Value Ref Range    Cannabinoids (48-[id number]-6-[id number]-9-THC) Not Detected NDET^Not Detected ng/mL      Comment:      Cutoff for a negative cannabinoid is 50 ng/mL or less.    Phencyclidine (Phencyclidine) Not Detected NDET^Not Detected ng/mL      Comment:      Cutoff for a negative PCP is 25 ng/mL or less.    Cocaine (Benzoylecgonine) Not Detected NDET^Not Detected ng/mL      Comment:      Cutoff for a negative  cocaine is 150 ng/ml or less.    Methamphetamine (d-Methamphetamine) Not Detected NDET^Not Detected ng/mL      Comment:      Cutoff for a negative methamphetamine is 500 ng/ml or less.    Opiates (Morphine) Not Detected NDET^Not Detected ng/mL      Comment:      Cutoff for a negative opiate is 100 ng/ml or less.    Amphetamine (d-Amphetamine) Not Detected NDET^Not Detected ng/mL      Comment:      Cutoff for a negative amphetamine is 500 ng/mL or less.    Benzodiazepines (Nordiazepam) Not Detected NDET^Not Detected ng/mL      Comment:      Cutoff for a negative benzodiazepine is 150 ng/ml or less.    Tricyclic Antidepressants (Desipramine) Not Detected NDET^Not Detected ng/mL      Comment:      Cutoff for a negative tricyclic antidepressant is 300 ng/ml or less.    Methadone (Methadone) Not Detected NDET^Not Detected ng/mL      Comment:      Cutoff for a negative methadone is 200 ng/ml or less.    Barbiturates (Butalbital) Not Detected NDET^Not Detected ng/mL      Comment:      Cutoff for a negative barbituate is 200 ng/ml or less.    Oxycodone (Oxycodone) Not Detected NDET^Not Detected ng/mL      Comment:      Cutoff for a negative Oxycodone is 100 ng/mL or less.    Propoxyphene (Norpropoxyphene) Not Detected NDET^Not Detected ng/mL      Comment:      Cutoff for a negative propoxyphene is 300 ng/ml or less    Buprenorphine (Buprenorphine) Not Detected NDET^Not Detected ng/mL      Comment:      Cutoff for a negative buprenorphine is 10 ng/ml or less       If you have any questions or concerns, please call the clinic at the number listed above.       Sincerely,        Margaret Chandra MD/o

## 2020-11-13 NOTE — LETTER
[facility]  11/13/20    Patient: Anamaria Parra  YOB: 1945  Medical Record Number: [id number]                                                                  Opioid / Opioid Plus Controlled Substance Agreement    I understand that my care provider has prescribed an opioid (narcotic) controlled substance to help manage my condition(s). I am taking this medicine to help me function or work. I know this is strong medicine, and that it can cause serious side effects. Opioid medicine can be sedating, addicting and may cause a dependency on the drug. They can affect my ability to drive or think, and cause depression. They need to be taken exactly as prescribed. Combining opioids with certain medicines or chemicals (such as cocaine, sedatives and tranquilizers, sleeping pills, meth) can be dangerous or even fatal. Also, if I stop opioids suddenly, I may have severe withdrawal symptoms. Last, I understand that opioids do not work for all types of pain nor for all patients. If not helpful, I may be asked to stop them.      The risks, benefits, and side effects of these medicine(s) were explained to me. I agree that:    1. I will take part in other treatments as advised by my care team. This may be psychiatry or counseling, physical therapy, behavioral therapy, group treatment or a referral to a pain clinic. I will reduce or stop my medicine when my care team tells me to do so.  2. I will take my medicines as prescribed. I will not change the dose or schedule unless my care team tells me to. There will be no refills if I  run out early.   I may be contactedwithout warning and asked to complete a urine drug test or pill count at any time.   3. I will keep all my appointments, and understand this is part of the monitoring of opioids. My care team may require an office visit for EVERY opioid/controlled substance refill. If I miss appointments or don t follow instructions, my care team may stop  my medicine.  4. I will not ask other providers to prescribe controlled substances, and I will not accept controlled substances from other people. If I need another prescribed controlled substance for a new reason, I will tell my care team within 1 business day.  5. I will use one pharmacy to fill all of my controlled substance prescriptions, and it is up to me to make sure that I do not run out of my medicines on weekends or holidays. If my care team is willing to refill my opioid prescription without a visit, I must request refills only during office hours, refills may take up to 3 days to process, and it may take up to 5 to 7 days for my medicine to be mailed and ready at my pharmacy. Prescriptions will not be mailed anywhere except my pharmacy.        [id number]  Rev 12/18         Registration to scan to EHR                             Page 1 of 2               Controlled Substance Agreement [facility]  11/13/20  Patient: Anamaria Parra  YOB: 1945  Medical Record Number: [id number]                                                                  6. I am responsible for my prescriptions. If the medicine/prescription is lost or stolen, it will not be replaced. I also agree not to share controlled substance medicines with anyone.  7. I agree to not use ANY illegal or recreational drugs. This includes marijuana, cocaine, bath salts or other drugs. I agree not to use alcohol unless my care team says I may.          I agree to give urine samples whenever asked. If I don t give a urine sample, the care team may stop my medicine.    8. If I enroll in the [place] Medical Marijuana program, I will tell my care team. I will also sign an agreement to share my medical records with my care team.   9. I will bring in my list of medicines (or my medicine bottles) each time I come to the clinic.   10. I will tell my care team right away if I become pregnant or have a new medical  problem treated outside of my regular clinic.  11. I understand that this medicine can affect my thinking and judgment. It may be unsafe for me to drive, use machinery and do dangerous tasks. I will not do any of these things until I know how the medicine affects me. If my dose changes, I will wait to see how it affects me. I will contact my care team if I have concerns about medicine side effects.    I understand that if I do not follow any of the conditions above, my prescriptions or treatment may be stopped.      I agree that my provider, clinic care team, and pharmacy may work with any city, state or federal law enforcement agency that investigates the misuse, sale, or other diversion of my controlled medicine. I will allow my provider to discuss my care with or share a copy of this agreement with any other treating provider, pharmacy or emergency room where I receive care. I agree to give up (waive) any right of privacy or confidentiality with respect to these consents.     I have read this agreement and have asked questions about anything I did not understand.      ________________________________________________________________________  Patient signature - Date/Time -  Anamaria Parra                                      ________________________________________________________________________  Witness signature                                                            ________________________________________________________________________  Provider signature - Margaret Chandra MD      [id number]  Rev 12/18         Registration to scan to EHR                         Page 2 of 2                   Controlled Substance Agreement Opioid           Page 1 of 2  Opioid Pain Medicines (also known as Narcotics)  What You Need to Know    What are opioids?   Opioids are pain medicines that must be prescribed by a doctor.  They are also known as narcotics.    Examples are:     morphine (MS Contin, Radha)    oxycodone  (Oxycontin)    oxycodone and acetaminophen (Percocet)    hydrocodone and acetaminophen (Vicodin, Norco)     fentanyl patch (Duragesic)     hydromorphone (Dilaudid)     methadone     What do opioids do well?   Opioids are best for short-term pain after a surgery or injury. They also work well for cancer pain. Unlike other pain medicines, they do not cause liver or kidney failure or ulcers. They may help some people with long-lasting (chronic) pain.     What do opioids NOT do well?   Opioids never get rid of pain entirely, and they do not work well for most patients with chronic pain. Opioids do not reduce swelling, one of the causes of pain. They also don t work well for nerve pain.                           For informational purposes only.  Not to replace the advice of your care provider.  Copyright [facility]. All right reserved. [employer] 072588-Cyt 02/18.      Page 2 of 2    Risks and side effects   Talk to your doctor before you start or decide to keep taking one of these medicines. Side effects include:    Lowering your breathing rate enough to cause death    Overdose, including death, especially if taking higher than prescribed doses    Long-term opioid use    Worse depression symptoms; less pleasure in things you usually enjoy    Feeling tired or sluggish    Slower thoughts or cloudy thinking    Being more sensitive to pain over time; pain is harder to control    Trouble sleeping or restless sleep    Changes in hormone levels (for example, less testosterone)    Changes in sex drive or ability to have sex    Constipation    Unsafe driving    Itching and sweating    Feeling dizzy    Nausea, vomiting and dry mouth    What else should I know about opioids?  When someone takes opioids for too long or too often, they become dependent. This means that if you stop or reduce the medicine too quickly, you will have withdrawal symptoms.    Dependence is not the same as addiction. Addiction is when  people keep using a substance that harms their body, their mind or their relations with others. If you have a history of drug or alcohol abuse, taking opioids can cause a relapse.    Over time, opioids don t work as well. Most people will need higher and higher doses. The higher the dose, the more serious the side effects. We don t know the long-term effects of opioids.      Prescribed opioids aren't the best way to manage chronic pain    Other ways to manage pain include:      Ibuprofen or acetaminophen.  You should always try this first.      Treat health problems that may be causing pain.      acupuncture or massage, deep breathing, meditation, visual imagery, aromatherapy.      Use heat or ice at the pain site      Physical therapy and exercise      Stop smoking      See a counselor or therapist                                                  People who have used opioids for a long time may have a lower quality of life, worse depression, higher levels of pain and more visits to doctors.    Never share your opioids with others. Be sure to store opioids in a secure place, locked if possible.Young children can easily swallow them and overdose.     You can overdose on opioids.  Signs of overdose include decrease or loss of consciousness, slowed breathing, trouble waking and blue lips.  If someone is worried about overdose, they should call 911.    If you are at risk for overdose, you may get naloxone (Narcan, a medicine that reverses the effects of opioids.  If you overdose, a friend or family member can give you Narcan while waiting for the ambulance.  They need to know the signs of overdose and how to give Narcan.    While you're taking opioids:    Don't use alcohol or street drugs. Taking them together can cause death.    Don't take any of these medicines unless your doctor says its okay.  Taking these with opioids can cause death.    Benzodiazepines (such as lorazepam         or diazepam)    Muscle relaxers  (such as cyclobenzaprine)    sleeping pills    other opioids    Safe disposal of opioids  Find your area drug take-back program, your pharmacy mail-back program, buy a special disposal bag (such as Deterra) from your pharmacy or flush them down the toilet.  Use the guidelines at:  www.fda.gov/drugs/resourcesforyou

## 2020-11-17 DIAGNOSIS — G89.4 CHRONIC PAIN SYNDROME: ICD-10-CM

## 2020-11-17 RX ORDER — TRAMADOL HYDROCHLORIDE 50 MG/1
TABLET ORAL
Qty: 180 TABLET | Refills: 0 | Status: SHIPPED | OUTPATIENT
Start: 2020-11-17 | End: 2020-12-19

## 2020-11-17 NOTE — TELEPHONE ENCOUNTER
Last Written Prescription Date:  10/20/20  Last Fill Quantity: 180,  # refills: 0   Last office visit: 11/13/2020 with prescribing provider:  Dr. Chandra with advised F/U in 6 months for med check, physical next 11/2021.  Future Office Visit: none    Routing refill request to provider for review/approval because:  Drug not on the [facility] refill protocol     Lisy Stokes, RN, BSN

## 2020-12-16 DIAGNOSIS — G89.4 CHRONIC PAIN SYNDROME: ICD-10-CM

## 2020-12-17 NOTE — TELEPHONE ENCOUNTER
Routing refill request to provider for review/approval because:  Drug not on the [facility] refill protocol     Last Written Prescription Date:  11-17-20  Last Fill Quantity: 180,  # refills: 0   Last office visit: 11/13/2020 with prescribing provider:  Dr Chandra   Future Office Visit:

## 2020-12-19 RX ORDER — TRAMADOL HYDROCHLORIDE 50 MG/1
TABLET ORAL
Qty: 180 TABLET | Refills: 0 | Status: SHIPPED | OUTPATIENT
Start: 2020-12-19 | End: 2021-01-17

## 2021-01-13 DIAGNOSIS — G89.4 CHRONIC PAIN SYNDROME: ICD-10-CM

## 2021-01-13 DIAGNOSIS — E11.65 TYPE 2 DIABETES MELLITUS WITH HYPERGLYCEMIA, WITHOUT LONG-TERM CURRENT USE OF INSULIN (H): ICD-10-CM

## 2021-01-17 RX ORDER — BLOOD SUGAR DIAGNOSTIC
STRIP MISCELLANEOUS
Qty: 100 STRIP | Refills: 0 | Status: SHIPPED | OUTPATIENT
Start: 2021-01-17 | End: 2021-03-01

## 2021-01-17 RX ORDER — TRAMADOL HYDROCHLORIDE 50 MG/1
TABLET ORAL
Qty: 180 TABLET | Refills: 0 | Status: SHIPPED | OUTPATIENT
Start: 2021-01-17 | End: 2021-02-19

## 2021-03-17 DIAGNOSIS — G89.4 CHRONIC PAIN SYNDROME: ICD-10-CM

## 2021-03-19 NOTE — TELEPHONE ENCOUNTER
Routing refill request to provider for review/approval because:  Drug not on the [facility] refill protocol   Last written on 2/19/21 for 180 tablets.    Fabiana Ignacio RN BSN  [facility]

## 2021-03-21 RX ORDER — TRAMADOL HYDROCHLORIDE 50 MG/1
TABLET ORAL
Qty: 180 TABLET | Refills: 0 | Status: SHIPPED | OUTPATIENT
Start: 2021-03-21 | End: 2021-04-21

## 2021-04-20 DIAGNOSIS — G89.4 CHRONIC PAIN SYNDROME: ICD-10-CM

## 2021-04-21 RX ORDER — TRAMADOL HYDROCHLORIDE 50 MG/1
TABLET ORAL
Qty: 180 TABLET | Refills: 0 | Status: SHIPPED | OUTPATIENT
Start: 2021-04-21 | End: 2021-05-20

## 2021-07-16 DIAGNOSIS — E78.5 HYPERLIPIDEMIA LDL GOAL <100: ICD-10-CM

## 2021-07-16 DIAGNOSIS — I10 HYPERTENSION GOAL BP (BLOOD PRESSURE) < 140/90: ICD-10-CM

## 2021-07-17 RX ORDER — LISINOPRIL 20 MG/1
20 TABLET ORAL DAILY
Qty: 90 TABLET | Refills: 0 | Status: SHIPPED | OUTPATIENT
Start: 2021-07-17 | End: 2021-10-18

## 2021-07-17 RX ORDER — ROSUVASTATIN CALCIUM 40 MG/1
40 TABLET, COATED ORAL DAILY
Qty: 90 TABLET | Refills: 0 | Status: SHIPPED | OUTPATIENT
Start: 2021-07-17 | End: 2021-10-18

## 2021-07-17 NOTE — TELEPHONE ENCOUNTER
Medication is being filled for 1 time refill only due to:  Patient needs labs fasting and recheck.

## 2021-07-22 DIAGNOSIS — G89.4 CHRONIC PAIN SYNDROME: ICD-10-CM

## 2021-07-22 NOTE — TELEPHONE ENCOUNTER
Routing refill request to provider for review/approval because:  Drug not on the [facility] refill protocol     Last Written Prescription Date:  6-22-21  Last Fill Quantity: 180,  # refills: 0   Last office visit: 11/13/2020 with prescribing provider:  Dr Chandra   Future Office Visit:

## 2021-07-23 RX ORDER — TRAMADOL HYDROCHLORIDE 50 MG/1
TABLET ORAL
Qty: 180 TABLET | Refills: 0 | Status: SHIPPED | OUTPATIENT
Start: 2021-07-23 | End: 2021-08-23

## 2021-12-03 ENCOUNTER — OFFICE VISIT (OUTPATIENT)
Dept: FAMILY MEDICINE | Facility: CLINIC | Age: 76
End: 2021-12-03
Payer: COMMERCIAL

## 2021-12-03 VITALS
BODY MASS INDEX: 29.75 KG/M2 | SYSTOLIC BLOOD PRESSURE: 137 MMHG | OXYGEN SATURATION: 97 % | WEIGHT: 198.6 LBS | DIASTOLIC BLOOD PRESSURE: 70 MMHG | TEMPERATURE: 98.4 F | HEART RATE: 113 BPM

## 2021-12-03 DIAGNOSIS — Z23 HIGH PRIORITY FOR 2019-NCOV VACCINE: ICD-10-CM

## 2021-12-03 DIAGNOSIS — F10.21 ALCOHOL DEPENDENCE IN REMISSION (H): ICD-10-CM

## 2021-12-03 DIAGNOSIS — I73.9 PAD (PERIPHERAL ARTERY DISEASE) (H): ICD-10-CM

## 2021-12-03 DIAGNOSIS — K21.9 GASTROESOPHAGEAL REFLUX DISEASE WITHOUT ESOPHAGITIS: ICD-10-CM

## 2021-12-03 DIAGNOSIS — E78.5 HYPERLIPIDEMIA LDL GOAL <100: ICD-10-CM

## 2021-12-03 DIAGNOSIS — Z23 NEED FOR PROPHYLACTIC VACCINATION AND INOCULATION AGAINST INFLUENZA: ICD-10-CM

## 2021-12-03 DIAGNOSIS — E11.65 TYPE 2 DIABETES MELLITUS WITH HYPERGLYCEMIA, WITHOUT LONG-TERM CURRENT USE OF INSULIN (H): Primary | ICD-10-CM

## 2021-12-03 DIAGNOSIS — I10 HYPERTENSION GOAL BP (BLOOD PRESSURE) < 140/90: ICD-10-CM

## 2021-12-03 DIAGNOSIS — E11.21 TYPE 2 DIABETES MELLITUS WITH DIABETIC NEPHROPATHY, WITHOUT LONG-TERM CURRENT USE OF INSULIN (H): ICD-10-CM

## 2021-12-03 DIAGNOSIS — G89.4 CHRONIC PAIN SYNDROME: ICD-10-CM

## 2021-12-03 DIAGNOSIS — I25.810 CORONARY ARTERY DISEASE INVOLVING AUTOLOGOUS ARTERY CORONARY BYPASS GRAFT WITHOUT ANGINA PECTORIS: ICD-10-CM

## 2021-12-03 DIAGNOSIS — Z02.89 PAIN MANAGEMENT CONTRACT SIGNED: ICD-10-CM

## 2021-12-03 DIAGNOSIS — E66.01 MORBID OBESITY DUE TO EXCESS CALORIES (H): ICD-10-CM

## 2021-12-03 DIAGNOSIS — N18.32 STAGE 3B CHRONIC KIDNEY DISEASE (H): ICD-10-CM

## 2021-12-03 LAB
ALBUMIN SERPL-MCNC: 3.6 G/DL (ref 3.4–5)
ALP SERPL-CCNC: 66 U/L (ref 40–150)
ALT SERPL W P-5'-P-CCNC: 35 U/L (ref 0–50)
ANION GAP SERPL CALCULATED.3IONS-SCNC: 9 MMOL/L (ref 3–14)
AST SERPL W P-5'-P-CCNC: 11 U/L (ref 0–45)
BILIRUB SERPL-MCNC: 0.4 MG/DL (ref 0.2–1.3)
BUN SERPL-MCNC: 45 MG/DL (ref 7–30)
CALCIUM SERPL-MCNC: 9.5 MG/DL (ref 8.5–10.1)
CHLORIDE BLD-SCNC: 103 MMOL/L (ref 94–109)
CHOLEST SERPL-MCNC: 117 MG/DL
CO2 SERPL-SCNC: 25 MMOL/L (ref 20–32)
CREAT SERPL-MCNC: 2.06 MG/DL (ref 0.52–1.04)
ERYTHROCYTE [DISTWIDTH] IN BLOOD BY AUTOMATED COUNT: 13.1 % (ref 10–15)
GFR SERPL CREATININE-BSD FRML MDRD: 23 ML/MIN/1.73M2
GLUCOSE BLD-MCNC: 169 MG/DL (ref 70–99)
HBA1C MFR BLD: 7.1 % (ref 0–5.6)
HCT VFR BLD AUTO: 40.4 % (ref 35–47)
HDLC SERPL-MCNC: 48 MG/DL
HGB BLD-MCNC: 12.6 G/DL (ref 11.7–15.7)
HOLD SPECIMEN: NORMAL
HOLD SPECIMEN: NORMAL
LDLC SERPL CALC-MCNC: 45 MG/DL
MCH RBC QN AUTO: 29.9 PG (ref 26.5–33)
MCHC RBC AUTO-ENTMCNC: 31.2 G/DL (ref 31.5–36.5)
MCV RBC AUTO: 96 FL (ref 78–100)
NONHDLC SERPL-MCNC: 69 MG/DL
PLATELET # BLD AUTO: 267 10E3/UL (ref 150–450)
POTASSIUM BLD-SCNC: 4.4 MMOL/L (ref 3.4–5.3)
PROT SERPL-MCNC: 7.4 G/DL (ref 6.8–8.8)
RBC # BLD AUTO: 4.21 10E6/UL (ref 3.8–5.2)
SODIUM SERPL-SCNC: 137 MMOL/L (ref 133–144)
TRIGL SERPL-MCNC: 120 MG/DL
TSH SERPL DL<=0.005 MIU/L-ACNC: 0.63 MU/L (ref 0.4–4)
WBC # BLD AUTO: 8.7 10E3/UL (ref 4–11)

## 2021-12-03 PROCEDURE — 80053 COMPREHEN METABOLIC PANEL: CPT | Performed by: FAMILY MEDICINE

## 2021-12-03 PROCEDURE — 91306 COVID-19,PF,MODERNA (18+ YRS BOOSTER .25ML): CPT | Performed by: FAMILY MEDICINE

## 2021-12-03 PROCEDURE — 36415 COLL VENOUS BLD VENIPUNCTURE: CPT | Performed by: FAMILY MEDICINE

## 2021-12-03 PROCEDURE — 85027 COMPLETE CBC AUTOMATED: CPT | Performed by: FAMILY MEDICINE

## 2021-12-03 PROCEDURE — 80061 LIPID PANEL: CPT | Performed by: FAMILY MEDICINE

## 2021-12-03 PROCEDURE — 84443 ASSAY THYROID STIM HORMONE: CPT | Performed by: FAMILY MEDICINE

## 2021-12-03 PROCEDURE — 0064A COVID-19,PF,MODERNA (18+ YRS BOOSTER .25ML): CPT | Performed by: FAMILY MEDICINE

## 2021-12-03 PROCEDURE — 90662 IIV NO PRSV INCREASED AG IM: CPT | Performed by: FAMILY MEDICINE

## 2021-12-03 PROCEDURE — G0008 ADMIN INFLUENZA VIRUS VAC: HCPCS | Performed by: FAMILY MEDICINE

## 2021-12-03 PROCEDURE — 83036 HEMOGLOBIN GLYCOSYLATED A1C: CPT | Performed by: FAMILY MEDICINE

## 2021-12-03 PROCEDURE — 99214 OFFICE O/P EST MOD 30 MIN: CPT | Mod: 25 | Performed by: FAMILY MEDICINE

## 2021-12-03 RX ORDER — OMEPRAZOLE 40 MG/1
CAPSULE, DELAYED RELEASE ORAL
Qty: 90 CAPSULE | Refills: 1 | Status: SHIPPED | OUTPATIENT
Start: 2021-12-03 | End: 2022-12-09

## 2021-12-03 RX ORDER — METOPROLOL SUCCINATE 50 MG/1
TABLET, EXTENDED RELEASE ORAL
Qty: 135 TABLET | Refills: 3 | Status: SHIPPED | OUTPATIENT
Start: 2021-12-03 | End: 2022-10-18

## 2021-12-03 RX ORDER — FUROSEMIDE 40 MG
40 TABLET ORAL DAILY
Qty: 90 TABLET | Refills: 3 | Status: SHIPPED | OUTPATIENT
Start: 2021-12-03 | End: 2022-10-18

## 2021-12-03 RX ORDER — LANCETS 33 GAUGE
EACH MISCELLANEOUS
Qty: 100 EACH | Refills: 0 | Status: CANCELLED | OUTPATIENT
Start: 2021-12-03

## 2021-12-03 RX ORDER — LISINOPRIL 20 MG/1
20 TABLET ORAL DAILY
Qty: 90 TABLET | Refills: 3 | Status: SHIPPED | OUTPATIENT
Start: 2021-12-03 | End: 2022-10-18

## 2021-12-03 RX ORDER — ROSUVASTATIN CALCIUM 40 MG/1
40 TABLET, COATED ORAL DAILY
Qty: 90 TABLET | Refills: 3 | Status: SHIPPED | OUTPATIENT
Start: 2021-12-03 | End: 2022-10-18

## 2021-12-03 RX ORDER — TRAMADOL HYDROCHLORIDE 50 MG/1
100 TABLET ORAL EVERY 8 HOURS PRN
Qty: 180 TABLET | Refills: 0 | Status: SHIPPED | OUTPATIENT
Start: 2021-12-03 | End: 2022-01-05

## 2021-12-03 RX ORDER — GLIPIZIDE 5 MG/1
TABLET, FILM COATED, EXTENDED RELEASE ORAL
Qty: 90 TABLET | Refills: 3 | Status: SHIPPED | OUTPATIENT
Start: 2021-12-03 | End: 2021-12-06

## 2021-12-03 RX ORDER — BLOOD SUGAR DIAGNOSTIC
STRIP MISCELLANEOUS
Qty: 100 STRIP | Refills: 3 | Status: SHIPPED | OUTPATIENT
Start: 2021-12-03 | End: 2022-05-04

## 2021-12-03 NOTE — LETTER
Opioid / Opioid Plus Controlled Substance Agreement    This is an agreement between you and your provider about the safe and appropriate use of controlled substance/opioids prescribed by your care team. Controlled substances are medicines that can cause physical and mental dependence (abuse).    There are strict laws about having and using these medicines. We here at [facility] are committing to working with you in your efforts to get better. To support you in this work, we ll help you schedule regular office appointments for medicine refills. If we must cancel or change your appointment for any reason, we ll make sure you have enough medicine to last until your next appointment.     As a Provider, I will:    Listen carefully to your concerns and treat you with respect.     Recommend a treatment plan that I believe is in your best interest. This plan may involve therapies other than opioid pain medication.     Talk with you often about the possible benefits, and the risk of harm of any medicine that we prescribe for you.     Provide a plan on how to taper (discontinue or go off) using this medicine if the decision is made to stop its use.    As a Patient, I understand that opioid(s):     Are a controlled substance prescribed by my care team to help me function or work and manage my condition(s).     Are strong medicines and can cause serious side effects such as:    Drowsiness, which can seriously affect my driving ability    A lower breathing rate, enough to cause death    Harm to my thinking ability     Depression     Abuse of and addiction to this medicine    Need to be taken exactly as prescribed. Combining opioids with certain medicines or chemicals (such as illegal drugs, sedatives, sleeping pills, and benzodiazepines) can be dangerous or even fatal. If I stop opioids suddenly, I may have severe withdrawal symptoms.    Do not work for all types of pain nor for all patients. If they re not helpful, I may  be asked to stop them.        The risks, benefits and side effects of these medicine(s) were explained to me. I agree that:  1. I will take part in other treatments as advised by my care team. This may be psychiatry or counseling, physical therapy, behavioral therapy, group treatment or a referral to a specialist.     2. I will keep all my appointments. I understand that this is part of the monitoring of opioids. My care team may require an office visit for EVERY opioid/controlled substance refill. If I miss appointments or don t follow instructions, my care team may stop my medicine.    3. I will take my medicines as prescribed. I will not change the dose or schedule unless my care team tells me to. There will be no refills if I run out early.     4. I may be asked to come to the clinic and complete a urine drug test or complete a pill count at any time. If I don t give a urine sample or participate in a pill count, the care team may stop my medicine.    5. I will only receive prescriptions from this clinic for chronic pain. If I am treated by another provider for acute pain issues, I will tell them that I am taking opioid pain medication for chronic pain and that I have a treatment agreement with this provider. I will inform my [facility] care team within one business day if I am given a prescription for any pain medication by another healthcare provider. My [facility] care team can contact other providers and pharmacists about my use of any medicines.    6. It is up to me to make sure that I don t run out of my medicines on weekends or holidays. If my care team is willing to refill my opioid prescription without a visit, I must request refills only during office hours. Refills may take up to 3 business days to process. I will use one pharmacy to fill all my opioid and other controlled substance prescriptions. I will notify the clinic about any changes to my insurance or medication  availability.    7. I am responsible for my prescriptions. If the medicine/prescription is lost, stolen or destroyed, it will not be replaced. I also agree not to share controlled substance medicines with anyone.    8. I am aware I should not use any illegal or recreational drugs. I agree not to drink alcohol unless my care team says I can.       9. If I enroll in the [place] Medical Cannabis program, I will tell my care team prior to my next refill.     10. I will tell my care team right away if I become pregnant, have a new medical problem treated outside of my regular clinic, or have a change in my medications.    11. I understand that this medicine can affect my thinking, judgment and reaction time. Alcohol and drugs affect the brain and body, which can affect the safety of my driving. Being under the influence of alcohol or drugs can affect my decision-making, behaviors, personal safety, and the safety of others. Driving while impaired (DWI) can occur if a person is driving, operating, or in physical control of a car, motorcycle, boat, snowmobile, ATV, motorbike, off-road vehicle, or any other motor vehicle ([place] Statute 169A.20). I understand the risk if I choose to drive or operate any vehicle or machinery.    I understand that if I do not follow any of the conditions above, my prescriptions or treatment may be stopped or changed.          Opioids  What You Need to Know    What are opioids?   Opioids are pain medicines that must be prescribed by a doctor. They are also known as narcotics.     Examples are:   1. morphine (MS Contin, Radha)  2. oxycodone (Oxycontin)  3. oxycodone and acetaminophen (Percocet)  4. hydrocodone and acetaminophen (Vicodin, Norco)   5. fentanyl patch (Duragesic)   6. hydromorphone (Dilaudid)   7. methadone  8. codeine (Tylenol #3)     What do opioids do well?   Opioids are best for severe short-term pain such as after a surgery or injury. They may work well for cancer pain. They may  help some people with long-lasting (chronic) pain.     What do opioids NOT do well?   Opioids never get rid of pain entirely, and they don t work well for most patients with chronic pain. Opioids don t reduce swelling, one of the causes of pain.                                    Other ways to manage chronic pain and improve function include:       Treat the health problem that may be causing pain    Anti-inflammation medicines, which reduce swelling and tenderness, such as ibuprofen (Advil, Motrin) or naproxen (Aleve)    Acetaminophen (Tylenol)    Antidepressants and anti-seizure medicines, especially for nerve pain    Topical treatments such as patches or creams    Injections or nerve blocks    Chiropractic or osteopathic treatment    Acupuncture, massage, deep breathing, meditation, visual imagery, aromatherapy    Use heat or ice at the pain site    Physical therapy     Exercise    Stop smoking    Take part in therapy       Risks and side effects     Talk to your doctor before you start or decide to keep taking opioids. Possible side effects include:      Lowering your breathing rate enough to cause death    Overdose, including death, especially if taking higher than prescribed doses    Worse depression symptoms; less pleasure in things you usually enjoy    Feeling tired or sluggish    Slower thoughts or cloudy thinking    Being more sensitive to pain over time; pain is harder to control    Trouble sleeping or restless sleep    Changes in hormone levels (for example, less testosterone)    Changes in sex drive or ability to have sex    Constipation    Unsafe driving    Itching and sweating    Dizziness    Nausea, throwing up and dry mouth    What else should I know about opioids?    Opioids may lead to dependence, tolerance, or addiction.      Dependence means that if you stop or reduce the medicine too quickly, you will have withdrawal symptoms. These include loose poop (diarrhea), jitters, flu-like symptoms,  nervousness and tremors. Dependence is not the same as addiction.                       Tolerance means needing higher doses over time to get the same effect. This may increase the chance of serious side effects.      Addiction is when people improperly use a substance that harms their body, their mind or their relations with others. Use of opiates can cause a relapse of addiction if you have a history of drug or alcohol abuse.      People who have used opioids for a long time may have a lower quality of life, worse depression, higher levels of pain and more visits to doctors.    You can overdose on opioids. Take these steps to lower your risk of overdose:    1. Recognize the signs:  Signs of overdose include decrease or loss of consciousness (blackout), slowed breathing, trouble waking up and blue lips. If someone is worried about overdose, they should call 911.    2. Talk to your doctor about Narcan (naloxone).   If you are at risk for overdose, you may be given a prescription for Narcan. This medicine very quickly reverses the effects of opioids.   If you overdose, a friend or family member can give you Narcan while waiting for the ambulance. They need to know the signs of overdose and how to give Narcan.     3. Don't use alcohol or street drugs.   Taking them with opioids can cause death.    4. Do not take any of these medicines unless your doctor says it s OK. Taking these with opioids can cause death:    Benzodiazepines, such as lorazepam (Ativan), alprazolam (Xanax) or diazepam (Valium)    Muscle relaxers, such as cyclobenzaprine (Flexeril)    Sleeping pills like zolpidem (Ambien)     Other opioids      How to keep you and other people safe while taking opioids:    1. Never share your opioids with others.  Opioid medicines are regulated by the Drug Enforcement Agency ([place]). Selling or sharing medications is a criminal act.    2. Be sure to store opioids in a secure place, locked up if possible. Young children  can easily swallow them and overdose.    3. When you are traveling with your medicines, keep them in the original bottles. If you use a pill box, be sure you also carry a copy of your medicine list from your clinic or pharmacy.    4. Safe disposal of opioids    Most pharmacies have places to get rid of medicine, called disposal kiosks. Medicine disposal options are also available in every [facility]. Search your county and  medication disposal  to find more options. You can find more details at:  [email or web address]-green/managing-[email or web address]-medications     I agree that my provider, clinic care team, and pharmacy may work with any city, state or federal law enforcement agency that investigates the misuse, sale, or other diversion of my controlled medicine. I will allow my provider to discuss my care with, or share a copy of, this agreement with any other treating provider, pharmacy or emergency room where I receive care.    I have read this agreement and have asked questions about anything I did not understand.    _______________________________________________________  Patient Signature - Anamaria Parra _____________________                   Date     _______________________________________________________  Provider Signature - Margaret Chandra MD   _____________________                   Date     _______________________________________________________  Witness Signature (required if provider not present while patient signing)   _____________________                   Date

## 2021-12-03 NOTE — LETTER
December 7, 2021      Anamaria Parra  [address] [place] [address]        Dear ,    We are writing to inform you of your test results.    Your recent labs looked good but kidney function is declining.   Recommend referral to a Kidney specialist. Please call and schedule an appointment.   In the interim  keep well hydrated daily.   For medications- I recommend that we discontinue Metformin (this goes through the kidneys) and we increase your Glipizide to 10 mg/day.   New Rx sent.   Please monitor your blood glucose during this period.   Follow up in a month, sooner if needed.      Resulted Orders   Hemoglobin A1c   Result Value Ref Range    Hemoglobin A1C 7.1 (H) 0.0 - 5.6 %      Comment:      Normal <5.7%   Prediabetes 5.7-6.4%    Diabetes 6.5% or higher     Note: Adopted from ADA consensus guidelines.   CBC with platelets   Result Value Ref Range    WBC Count 8.7 4.0 - 11.0 10e3/uL    RBC Count 4.21 3.80 - 5.20 10e6/uL    Hemoglobin 12.6 11.7 - 15.7 g/dL    Hematocrit 40.4 35.0 - 47.0 %    MCV 96 78 - 100 fL    MCH 29.9 26.5 - 33.0 pg    MCHC 31.2 (L) 31.5 - 36.5 g/dL    RDW 13.1 10.0 - 15.0 %    Platelet Count 267 150 - 450 10e3/uL   TSH WITH FREE T4 REFLEX   Result Value Ref Range    TSH 0.63 0.40 - 4.00 mU/L   COMPREHENSIVE METABOLIC PANEL   Result Value Ref Range    Sodium 137 133 - 144 mmol/L    Potassium 4.4 3.4 - 5.3 mmol/L    Chloride 103 94 - 109 mmol/L    Carbon Dioxide (CO2) 25 20 - 32 mmol/L    Anion Gap 9 3 - 14 mmol/L    Urea Nitrogen 45 (H) 7 - 30 mg/dL    Creatinine 2.06 (H) 0.52 - 1.04 mg/dL    Calcium 9.5 8.5 - 10.1 mg/dL    Glucose 169 (H) 70 - 99 mg/dL    Alkaline Phosphatase 66 40 - 150 U/L    AST 11 0 - 45 U/L    ALT 35 0 - 50 U/L    Protein Total 7.4 6.8 - 8.8 g/dL    Albumin 3.6 3.4 - 5.0 g/dL    Bilirubin Total 0.4 0.2 - 1.3 mg/dL    GFR Estimate 23 (L) >60 mL/min/1.73m2      Comment:      As of July 11, 2021, eGFR is calculated by the CKD-EPI creatinine equation,  without race adjustment. eGFR can be influenced by muscle mass, exercise, and diet. The reported eGFR is an estimation only and is only applicable if the renal function is stable.   Lipid panel reflex to direct LDL Fasting   Result Value Ref Range    Cholesterol 117 <200 mg/dL    Triglycerides 120 <150 mg/dL    Direct Measure HDL 48 (L) >=50 mg/dL    LDL Cholesterol Calculated 45 <=100 mg/dL    Non HDL Cholesterol 69 <130 mg/dL    Narrative    Cholesterol  Desirable:  <200 mg/dL    Triglycerides  Normal:  Less than 150 mg/dL  Borderline High:  150-199 mg/dL  High:  200-499 mg/dL  Very High:  Greater than or equal to 500 mg/dL    Direct Measure HDL  Female:  Greater than or equal to 50 mg/dL   Male:  Greater than or equal to 40 mg/dL    LDL Cholesterol  Desirable:  <100mg/dL  Above Desirable:  100-129 mg/dL   Borderline High:  130-159 mg/dL   High:  160-189 mg/dL   Very High:  >= 190 mg/dL    Non HDL Cholesterol  Desirable:  130 mg/dL  Above Desirable:  130-159 mg/dL  Borderline High:  160-189 mg/dL  High:  190-219 mg/dL  Very High:  Greater than or equal to 220 mg/dL       If you have any questions or concerns, please call the clinic at the number listed above.       Sincerely,      Margaret Chandra M.D/julio

## 2021-12-03 NOTE — PROGRESS NOTES
Assessment & Plan     Type 2 diabetes mellitus with hyperglycemia, without long-term current use of insulin (H), controlled.  A1C 7.1 today  - Hemoglobin A1c  - Adult Eye Referral  - CBC with platelets  - COMPREHENSIVE METABOLIC PANEL  - Albumin Random Urine Quantitative with Creat Ratio  - TSH WITH FREE T4 REFLEX  - Refill: glipiZIDE (GLUCOTROL XL) 5 MG 24 hr tablet  Dispense: 90 tablet; Refill: 3  - Refill: metFORMIN (GLUCOPHAGE) 500 MG tablet  Dispense: 180 tablet; Refill: 3  - blood glucose (ONETOUCH ULTRA) test strip  Dispense: 100 strip; Refill: 3      Type 2 diabetes mellitus with diabetic nephropathy, without long-term current use of insulin (H), stable.     Coronary artery disease involving autologous artery coronary bypass graft without angina pectoris  - Stable.    Stage 3b chronic kidney disease (H)  - CBC with platelets  - COMPREHENSIVE METABOLIC PANEL    Hypertension goal BP (blood pressure) < 140/90, controlled.  - Refill: furosemide (LASIX) 40 MG tablet  Dispense: 90 tablet; Refill: 3  - Refill: lisinopril (ZESTRIL) 20 MG tablet  Dispense: 90 tablet; Refill: 3  - Refill: metoprolol succinate ER (TOPROL-XL) 50 MG 24 hr tablet  Dispense: 135 tablet; Refill: 3    Gastroesophageal reflux disease without esophagitis  - Refill: omeprazole (PRILOSEC) 40 MG DR capsule  Dispense: 90 capsule; Refill: 1    Hyperlipidemia LDL goal <100  - Lipid panel reflex to direct LDL Fasting  - TSH WITH FREE T4 REFLEX  - Refill: rosuvastatin (CRESTOR) 40 MG tablet  Dispense: 90 tablet; Refill: 3    PAD (peripheral artery disease) s/p stent placement.   - Uses compression stockings. No concerns.       Alcohol dependence in remission (H)  Has been sober for many years. Last drink was in the 1990s    Chronic pain syndrome  -  Refill: traMADol (ULTRAM) 50 MG tablet  Dispense: 180 tablet; Refill: 0    Pain management contract signed  - Contract renewed today. Patient given a copy.     Need for prophylactic vaccination and  inoculation against influenza  - MD FLU VACCINE, INCREASED ANTIGEN, PRESV FREE ([id number])    High priority for 2019-nCoV vaccine  - COVID-19,PF,MODERNA (18+ Yrs BOOSTER .25mL)        Return in about 6 months (around 6/3/2022) for Diabetes Follow Up with a HgbA1C prior to visit.    Margaret Chandra MD  [facility] GUILLERMO Teague is a 76 year old who presents for the following health issues     HPI       Declines foot check today-- would like to do next visit.  Will also get TDAP at next visit.     Diabetes Follow-up    How often are you checking your blood sugar? Two times daily --- 140-160  Blood sugar testing frequency justification:  Uncontrolled diabetes  What time of day are you checking your blood sugars (select all that apply)?  Before meals  Have you had any blood sugars above 200?  No  Have you had any blood sugars below 70?  No    What symptoms do you notice when your blood sugar is low?  None    What concerns do you have today about your diabetes? None     Do you have any of these symptoms? (Select all that apply)  No numbness or tingling in feet.  No redness, sores or blisters on feet.  No complaints of excessive thirst.  No reports of blurry vision.  No significant changes to weight.    Have you had a diabetic eye exam in the last 12 months? No      History of left leg ulcer and PAD s/p stent placement- stable. Uses compression stockings. No concerns.       Chronic kidney disease- stage 3.   Last renal function test.   Component      Latest Ref Rng & Units 11/13/2020   Creatinine      0.52 - 1.04 mg/dL 1.51 (H)   GFR Estimate      >60 mL/min/1.73:m2 33 (L)       Hyperlipidemia Follow-Up  Currently on Crestor 40 mg/day.    Are you regularly taking any medication or supplement to lower your cholesterol?   Yes- statin    Are you having muscle aches or other side effects that you think could be caused by your cholesterol lowering medication?  No     Last lipid panel-    Recent Labs    Lab Test 07/22/20  1056 08/13/18  0854 08/02/16  0846 06/23/15  1049 05/22/14  0912   CHOL 126 134   < > 139 137   HDL 43* 43*   < > 51 44*   LDL 56 61   < > 67 75   TRIG 137 149   < > 105 92   CHOLHDLRATIO  --   --   --  2.7 3.1    < > = values in this interval not displayed.       GERD- currently on Omeprazole 40 mg/day. Tolerating well. No side effects reported.     History of alcohol use. In remission since the 1990s.     Hypertension Follow-up      Do you check your blood pressure regularly outside of the clinic? Yes     Are you following a low salt diet? Yes    Are your blood pressures ever more than 140 on the top number (systolic) OR more   than 90 on the bottom number (diastolic), for example 140/90? No    BP Readings from Last 2 Encounters:   12/03/21 137/70   11/13/20 127/78     Hemoglobin A1C (%)   Date Value   12/03/2021 7.1 (H)   11/13/2020 7.4 (H)   07/22/2020 7.4 (H)     LDL Cholesterol Calculated (mg/dL)   Date Value   07/22/2020 56   08/13/2018 61       Review of Systems   Constitutional, HEENT, cardiovascular, pulmonary, gi and gu systems are negative, except as otherwise noted.      Objective    /70   Pulse 113   Temp 98.4  F (36.9  C) (Tympanic)   Wt 90.1 kg (198 lb 9.6 oz)   SpO2 97%   Breastfeeding No   BMI 29.75 kg/m    Body mass index is 29.75 kg/m .  Physical Exam   GENERAL: healthy, alert and no distress  NECK: no adenopathy, no asymmetry, masses, or scars and thyroid normal to palpation  RESP: lungs clear to auscultation - no rales, rhonchi or wheezes  CV: regular rate and rhythm, normal S1 S2, no S3 or S4, no murmur, click or rub, no peripheral edema and peripheral pulses strong    DATA  Reviewed and discussed with patient prior to discharge.  Results for orders placed or performed in visit on 12/03/21   Hemoglobin A1c     Status: Abnormal   Result Value Ref Range    Hemoglobin A1C 7.1 (H) 0.0 - 5.6 %   Extra Red Top Tube     Status: None   Result Value Ref Range    Hold  Specimen JIC    Extra Green Top (Lithium Heparin) Tube     Status: None   Result Value Ref Range    Hold Specimen JIC    CBC with platelets     Status: Abnormal   Result Value Ref Range    WBC Count 8.7 4.0 - 11.0 10e3/uL    RBC Count 4.21 3.80 - 5.20 10e6/uL    Hemoglobin 12.6 11.7 - 15.7 g/dL    Hematocrit 40.4 35.0 - 47.0 %    MCV 96 78 - 100 fL    MCH 29.9 26.5 - 33.0 pg    MCHC 31.2 (L) 31.5 - 36.5 g/dL    RDW 13.1 10.0 - 15.0 %    Platelet Count 267 150 - 450 10e3/uL   Extra Tube     Status: None    Narrative    The following orders were created for panel order Extra Tube.  Procedure                               Abnormality         Status                     ---------                               -----------         ------                     Extra Red Top Tube[[id number]]                               Final result               Extra Green Top (Lithium...[[id number]]                      Final result               Extra Purple Top Tube[[id number]]                                                         Please view results for these tests on the individual orders.

## 2021-12-06 DIAGNOSIS — E11.65 TYPE 2 DIABETES MELLITUS WITH HYPERGLYCEMIA, WITHOUT LONG-TERM CURRENT USE OF INSULIN (H): ICD-10-CM

## 2021-12-06 DIAGNOSIS — N18.4 CKD (CHRONIC KIDNEY DISEASE) STAGE 4, GFR 15-29 ML/MIN (H): Primary | ICD-10-CM

## 2021-12-06 RX ORDER — GLIPIZIDE 10 MG/1
10 TABLET, FILM COATED, EXTENDED RELEASE ORAL DAILY
Qty: 90 TABLET | Refills: 3 | Status: SHIPPED | OUTPATIENT
Start: 2021-12-06 | End: 2022-10-18

## 2022-01-03 DIAGNOSIS — G89.4 CHRONIC PAIN SYNDROME: ICD-10-CM

## 2022-01-05 RX ORDER — TRAMADOL HYDROCHLORIDE 50 MG/1
TABLET ORAL
Qty: 180 TABLET | Refills: 0 | Status: SHIPPED | OUTPATIENT
Start: 2022-01-05 | End: 2022-02-02

## 2022-01-12 ENCOUNTER — OFFICE VISIT (OUTPATIENT)
Dept: FAMILY MEDICINE | Facility: CLINIC | Age: 77
End: 2022-01-12
Payer: COMMERCIAL

## 2022-01-12 VITALS
BODY MASS INDEX: 30.17 KG/M2 | SYSTOLIC BLOOD PRESSURE: 128 MMHG | HEART RATE: 90 BPM | RESPIRATION RATE: 20 BRPM | TEMPERATURE: 98.1 F | DIASTOLIC BLOOD PRESSURE: 60 MMHG | OXYGEN SATURATION: 98 % | WEIGHT: 201.4 LBS

## 2022-01-12 DIAGNOSIS — E11.65 TYPE 2 DIABETES MELLITUS WITH HYPERGLYCEMIA, WITHOUT LONG-TERM CURRENT USE OF INSULIN (H): ICD-10-CM

## 2022-01-12 DIAGNOSIS — R53.83 FATIGUE, UNSPECIFIED TYPE: Primary | ICD-10-CM

## 2022-01-12 DIAGNOSIS — N18.4 CKD (CHRONIC KIDNEY DISEASE) STAGE 4, GFR 15-29 ML/MIN (H): ICD-10-CM

## 2022-01-12 DIAGNOSIS — I10 HYPERTENSION GOAL BP (BLOOD PRESSURE) < 140/90: ICD-10-CM

## 2022-01-12 DIAGNOSIS — E66.01 MORBID OBESITY DUE TO EXCESS CALORIES (H): ICD-10-CM

## 2022-01-12 LAB
ALBUMIN SERPL-MCNC: 3.6 G/DL (ref 3.4–5)
ANION GAP SERPL CALCULATED.3IONS-SCNC: 6 MMOL/L (ref 3–14)
BUN SERPL-MCNC: 32 MG/DL (ref 7–30)
CALCIUM SERPL-MCNC: 9.5 MG/DL (ref 8.5–10.1)
CHLORIDE BLD-SCNC: 105 MMOL/L (ref 94–109)
CO2 SERPL-SCNC: 27 MMOL/L (ref 20–32)
CREAT SERPL-MCNC: 1.41 MG/DL (ref 0.52–1.04)
ERYTHROCYTE [DISTWIDTH] IN BLOOD BY AUTOMATED COUNT: 13 % (ref 10–15)
GFR SERPL CREATININE-BSD FRML MDRD: 38 ML/MIN/1.73M2
GLUCOSE BLD-MCNC: 173 MG/DL (ref 70–99)
HCT VFR BLD AUTO: 41.1 % (ref 35–47)
HGB BLD-MCNC: 12.9 G/DL (ref 11.7–15.7)
MCH RBC QN AUTO: 29.9 PG (ref 26.5–33)
MCHC RBC AUTO-ENTMCNC: 31.4 G/DL (ref 31.5–36.5)
MCV RBC AUTO: 95 FL (ref 78–100)
PHOSPHATE SERPL-MCNC: 3.7 MG/DL (ref 2.5–4.5)
PLATELET # BLD AUTO: 172 10E3/UL (ref 150–450)
POTASSIUM BLD-SCNC: 4.2 MMOL/L (ref 3.4–5.3)
RBC # BLD AUTO: 4.31 10E6/UL (ref 3.8–5.2)
SODIUM SERPL-SCNC: 138 MMOL/L (ref 133–144)
WBC # BLD AUTO: 7.8 10E3/UL (ref 4–11)

## 2022-01-12 PROCEDURE — 99214 OFFICE O/P EST MOD 30 MIN: CPT | Performed by: FAMILY MEDICINE

## 2022-01-12 PROCEDURE — 85027 COMPLETE CBC AUTOMATED: CPT | Performed by: FAMILY MEDICINE

## 2022-01-12 PROCEDURE — 80069 RENAL FUNCTION PANEL: CPT | Performed by: FAMILY MEDICINE

## 2022-01-12 PROCEDURE — 82043 UR ALBUMIN QUANTITATIVE: CPT | Performed by: FAMILY MEDICINE

## 2022-01-12 PROCEDURE — 36415 COLL VENOUS BLD VENIPUNCTURE: CPT | Performed by: FAMILY MEDICINE

## 2022-01-12 NOTE — PROGRESS NOTES
Assessment & Plan     Fatigue, unspecified type with CKD (chronic kidney disease) stage 4, GFR 15-29 ml/min (H)  - CBC with platelets  - Renal panel (Alb, BUN, Ca, Cl, CO2, Creat, Gluc, Phos, K, Na)  - Patient scheduled to visit with Nephrology.     Type 2 diabetes mellitus with hyperglycemia, without long-term current use of insulin (H)  - Albumin Random Urine Quantitative with Creat Ratio    Hypertension goal BP (blood pressure) < 140/90, controlled.  - Continue current management.    Morbid obesity due to excess calories (H)  - Weight management plan: Discussed healthy diet and exercise guidelines        Return in about 1 week (around 1/19/2022), or if symptoms worsen or fail to improve.    Margaret Chandra MD  [facility] GUILLERMO Teague is a 76 year old who presents for the following health issues  accompanied by her son.    HPI         Follow Up- Kidney Function   Referral was placed for kidney specialist, appointment was scheduled for 5/9/22  Diabetic medication changes were made.  She stopped the Metformin and started glipizid.    Reporting increase in fatigue, son is reporting she has been more tired, and noticed lower energy levels.       Last renal function tests-    Component      Latest Ref Rng & Units 12/3/2021   Creatinine      0.52 - 1.04 mg/dL 2.06 (H)     Component      Latest Ref Rng & Units 12/3/2021   GFR Estimate      >60 mL/min/1.73m2 23 (L)       Patient expresses worry about her declining kidney function.   Has a known history of Type 2 Diabetes. Metformin was discontinued. Glipizide started.   Last A1c- 7.1 a month ago.   Due for urine micro albumin.       Review of Systems   Constitutional, HEENT, cardiovascular, pulmonary, gi and gu systems are negative, except as otherwise noted.      Objective    /60   Pulse 90   Temp 98.1  F (36.7  C) (Tympanic)   Resp 20   Wt 91.4 kg (201 lb 6.4 oz)   SpO2 98%   Breastfeeding No   BMI 30.17 kg/m    Body mass  index is 30.17 kg/m .  Physical Exam   GENERAL: healthy, alert and no distress  RESP: lungs clear to auscultation - no rales, rhonchi or wheezes  CV: regular rate and rhythm, normal S1 S2, no S3 or S4, no murmur, click or rub, no peripheral edema and peripheral pulses strong  PSYCH: mentation appears normal, affect normal/bright    DATA  Labs drawn and in process.

## 2022-01-12 NOTE — PATIENT INSTRUCTIONS
Will notify you with results.   Patient Education   Chronic Kidney Disease: Common Questions  What do the kidneys do?  The kidneys are the organs that help clean your blood. The waste products in the blood leave your body through urine.  The kidneys also:    help remove extra fluid    help balance important chemicals in your body (sodium, potassium, calcium and phosphorus).    make hormones that help control your blood pressure, create red blood cells and keep your bones healthy.  What is chronic kidney disease?  A kidney is made up of more than a million tiny filters. Kidney disease occurs when these tiny filters are damaged. The damage happens slowly over the years to both kidneys and it's permanent.   Chronic kidney disease is divided into 5 stages. Stage 1 is the least kidney damage; Stage 5 is the most. We confirm the stage by testing your blood for a waste product called creatinine. This result along with your age, race, and sex give us your GFR (glomerular filtration rate).  Chronic Kidney Disease Stages GFR   1 90 or greater    2 60 to 89   3 30 to 59   4 15 to 29   5 (end stage) less than 15   What causes CKD?  The two most common causes of kidney disease are diabetes and high blood pressure.   Other causes include infections, genetic or immune disorders, and damage caused by kidney stones or tumors.  How is kidney disease treated?  Treatment is often based on the cause. The goals of treatment are to prevent and slow the progress of the disease.   Treatment may include:    Managing the health problems that are harming your kidneys (diabetes, high blood pressure).    Avoiding the medicines that may harm your kidneys (For example, Ibuprofen, Advil, Aleve, Naproxen). Ask your care team if you are unsure if a medicine is safe for your kidneys.    Managing side effects of the disease: such as bone disease or anemia (low red blood cells)  What if my kidney disease gets worse?  If kidneys fail, you may need  dialysis or a kidney transplant. Your doctor and RN coordinator will help you understand your options and come up with a treatment plan that is best for you.  How can I cope with chronic kidney disease?  Dealing with a chronic illness is not easy. But, there are many resources to help you cope. You may also want to talk with other people who have chronic kidney disease. We offer a monthly class where you will meet others with chronic kidney disease and get answers to your questions.  The more you learn about kidney disease, the more confident you will feel about managing it. For more information and support, contact:  American Association of Kidney Patients   www.aakp.org  9-211-609-2624  American Kidney Fund   www.akfinc.org  1-517.314.4410  National Kidney Foundation   www.kidney.org  0-893-106-3715  Al[place] Syndrome Foundation   www.alportsyndrome.org  Polycystic Kidney Disease Foundation   www.pkdcure.org  Questions?  Please call us as often as you need to. We will gladly answer your questions and address your concerns.  [facility]:  [phone]  [place] Chronic Kidney Disease nurse:  [phone]  For informational purposes only. Not to replace the advice of your health care provider.   Copyright   2008 [employer]. All rights reserved. [employer] 839766 - Rev 01/18.

## 2022-01-12 NOTE — LETTER
January 17, 2022      Anamaria Parra  [address] [place] [address]        Dear ,    We are writing to inform you of your test results.    Your recent labs looked good.   Complete blood count was normal. No evidence of anemia at this time.   Your kidney function improved and is now back to baseline.   Urine microalbumin test showed evidence of a blood protein (albumin) in your urine, a complication of diabetes known as microalbuminuria.   It's important that we keep your blood pressure and diabetes under tight control to prevent further kidney damage. Will repeat this urine test in a year.     Let me know if you are still having symptoms of fatigue and will proceed with further workup.     Resulted Orders   CBC with platelets   Result Value Ref Range    WBC Count 7.8 4.0 - 11.0 10e3/uL    RBC Count 4.31 3.80 - 5.20 10e6/uL    Hemoglobin 12.9 11.7 - 15.7 g/dL    Hematocrit 41.1 35.0 - 47.0 %    MCV 95 78 - 100 fL    MCH 29.9 26.5 - 33.0 pg    MCHC 31.4 (L) 31.5 - 36.5 g/dL    RDW 13.0 10.0 - 15.0 %    Platelet Count 172 150 - 450 10e3/uL   Renal panel (Alb, BUN, Ca, Cl, CO2, Creat, Gluc, Phos, K, Na)   Result Value Ref Range    Sodium 138 133 - 144 mmol/L    Potassium 4.2 3.4 - 5.3 mmol/L    Chloride 105 94 - 109 mmol/L    Carbon Dioxide (CO2) 27 20 - 32 mmol/L    Anion Gap 6 3 - 14 mmol/L    Urea Nitrogen 32 (H) 7 - 30 mg/dL    Creatinine 1.41 (H) 0.52 - 1.04 mg/dL    Calcium 9.5 8.5 - 10.1 mg/dL    Glucose 173 (H) 70 - 99 mg/dL    Albumin 3.6 3.4 - 5.0 g/dL    Phosphorus 3.7 2.5 - 4.5 mg/dL    GFR Estimate 38 (L) >60 mL/min/1.73m2      Comment:      Effective December 21, 2021 eGFRcr in adults is calculated using the 2021 CKD-EPI creatinine equation which includes age and gender (Marely hu al., NEJ, DOI: 10.1056/BWXXhm6387081)   Albumin Random Urine Quantitative with Creat Ratio   Result Value Ref Range    Creatinine Urine mg/dL 41 mg/dL    Albumin Urine mg/L 26 mg/L    Albumin Urine mg/g Cr  63.41 (H) 0.00 - 25.00 mg/g Cr       Sincerely,    Margaret Chandra M.D/julio

## 2022-01-13 LAB
CREAT UR-MCNC: 41 MG/DL
MICROALBUMIN UR-MCNC: 26 MG/L
MICROALBUMIN/CREAT UR: 63.41 MG/G CR (ref 0–25)

## 2022-02-03 ENCOUNTER — TRANSFERRED RECORDS (OUTPATIENT)
Dept: HEALTH INFORMATION MANAGEMENT | Facility: CLINIC | Age: 77
End: 2022-02-03
Payer: COMMERCIAL

## 2022-02-03 LAB — RETINOPATHY: NEGATIVE

## 2022-04-22 ENCOUNTER — TRANSFERRED RECORDS (OUTPATIENT)
Dept: HEALTH INFORMATION MANAGEMENT | Facility: CLINIC | Age: 77
End: 2022-04-22
Payer: COMMERCIAL

## 2022-04-22 ENCOUNTER — TELEPHONE (OUTPATIENT)
Dept: NEPHROLOGY | Facility: CLINIC | Age: 77
End: 2022-04-22
Payer: COMMERCIAL

## 2022-04-22 DIAGNOSIS — N18.32 STAGE 3B CHRONIC KIDNEY DISEASE (H): Primary | ICD-10-CM

## 2022-04-22 LAB — RETINOPATHY: NEGATIVE

## 2022-04-22 NOTE — TELEPHONE ENCOUNTER
[facility] Call Center    Phone Message    May a detailed message be left on voicemail: yes     Reason for Call: Order(s): Other:   Reason for requested: Lab orders prior to appointment  Date needed: 5/4/22  Provider name: Dr. Johnson      Action Taken: Message routed to:  [facility] ([facility] Neph    Travel Screening: Not Applicable

## 2022-05-04 ENCOUNTER — LAB (OUTPATIENT)
Dept: LAB | Facility: CLINIC | Age: 77
End: 2022-05-04
Payer: COMMERCIAL

## 2022-05-04 ENCOUNTER — OFFICE VISIT (OUTPATIENT)
Dept: FAMILY MEDICINE | Facility: CLINIC | Age: 77
End: 2022-05-04

## 2022-05-04 VITALS
SYSTOLIC BLOOD PRESSURE: 123 MMHG | BODY MASS INDEX: 30.08 KG/M2 | RESPIRATION RATE: 16 BRPM | TEMPERATURE: 97.7 F | WEIGHT: 200.8 LBS | HEART RATE: 98 BPM | OXYGEN SATURATION: 97 % | DIASTOLIC BLOOD PRESSURE: 65 MMHG

## 2022-05-04 DIAGNOSIS — N18.4 CKD (CHRONIC KIDNEY DISEASE) STAGE 4, GFR 15-29 ML/MIN (H): ICD-10-CM

## 2022-05-04 DIAGNOSIS — G89.4 CHRONIC PAIN SYNDROME: ICD-10-CM

## 2022-05-04 DIAGNOSIS — E11.21 TYPE 2 DIABETES MELLITUS WITH DIABETIC NEPHROPATHY, WITHOUT LONG-TERM CURRENT USE OF INSULIN (H): ICD-10-CM

## 2022-05-04 DIAGNOSIS — H26.20 CATARACT WITH COMPLICATION OF LEFT EYE, UNSPECIFIED COMPLICATED CATARACT TYPE: ICD-10-CM

## 2022-05-04 DIAGNOSIS — I73.9 PAD (PERIPHERAL ARTERY DISEASE) (H): ICD-10-CM

## 2022-05-04 DIAGNOSIS — E11.65 TYPE 2 DIABETES MELLITUS WITH HYPERGLYCEMIA, WITHOUT LONG-TERM CURRENT USE OF INSULIN (H): ICD-10-CM

## 2022-05-04 DIAGNOSIS — Z79.891 LONG TERM (CURRENT) USE OF OPIATE ANALGESIC: ICD-10-CM

## 2022-05-04 DIAGNOSIS — N18.32 STAGE 3B CHRONIC KIDNEY DISEASE (H): ICD-10-CM

## 2022-05-04 DIAGNOSIS — I25.810 CORONARY ARTERY DISEASE INVOLVING AUTOLOGOUS ARTERY CORONARY BYPASS GRAFT WITHOUT ANGINA PECTORIS: ICD-10-CM

## 2022-05-04 DIAGNOSIS — Z01.818 PREOPERATIVE EXAMINATION: Primary | ICD-10-CM

## 2022-05-04 DIAGNOSIS — F10.21 ALCOHOL DEPENDENCE IN REMISSION (H): ICD-10-CM

## 2022-05-04 LAB
ALBUMIN SERPL-MCNC: 3.6 G/DL (ref 3.4–5)
ALBUMIN UR-MCNC: NEGATIVE MG/DL
ANION GAP SERPL CALCULATED.3IONS-SCNC: 6 MMOL/L (ref 3–14)
APPEARANCE UR: CLEAR
BACTERIA #/AREA URNS HPF: ABNORMAL /HPF
BILIRUB UR QL STRIP: NEGATIVE
BUN SERPL-MCNC: 36 MG/DL (ref 7–30)
CALCIUM SERPL-MCNC: 9.5 MG/DL (ref 8.5–10.1)
CHLORIDE BLD-SCNC: 103 MMOL/L (ref 94–109)
CO2 SERPL-SCNC: 26 MMOL/L (ref 20–32)
COLOR UR AUTO: YELLOW
CREAT SERPL-MCNC: 1.68 MG/DL (ref 0.52–1.04)
CREAT UR-MCNC: 55 MG/DL
CREAT UR-MCNC: 57 MG/DL
DEPRECATED CALCIDIOL+CALCIFEROL SERPL-MC: 50 UG/L (ref 20–75)
ERYTHROCYTE [DISTWIDTH] IN BLOOD BY AUTOMATED COUNT: 13.2 % (ref 10–15)
GFR SERPL CREATININE-BSD FRML MDRD: 31 ML/MIN/1.73M2
GLUCOSE BLD-MCNC: 241 MG/DL (ref 70–99)
GLUCOSE UR STRIP-MCNC: NEGATIVE MG/DL
HBA1C MFR BLD: 9 % (ref 0–5.6)
HCT VFR BLD AUTO: 40.9 % (ref 35–47)
HGB BLD-MCNC: 12.4 G/DL (ref 11.7–15.7)
HGB UR QL STRIP: NEGATIVE
KETONES UR STRIP-MCNC: NEGATIVE MG/DL
LEUKOCYTE ESTERASE UR QL STRIP: NEGATIVE
MCH RBC QN AUTO: 29 PG (ref 26.5–33)
MCHC RBC AUTO-ENTMCNC: 30.3 G/DL (ref 31.5–36.5)
MCV RBC AUTO: 96 FL (ref 78–100)
NITRATE UR QL: NEGATIVE
PH UR STRIP: 5.5 [PH] (ref 5–7)
PHOSPHATE SERPL-MCNC: 3.5 MG/DL (ref 2.5–4.5)
PLATELET # BLD AUTO: 201 10E3/UL (ref 150–450)
POTASSIUM BLD-SCNC: 5 MMOL/L (ref 3.4–5.3)
PROT UR-MCNC: 0.32 G/L
PROT/CREAT 24H UR: 0.58 G/G CR (ref 0–0.2)
PTH-INTACT SERPL-MCNC: 97 PG/ML (ref 18–80)
RBC # BLD AUTO: 4.28 10E6/UL (ref 3.8–5.2)
RBC #/AREA URNS AUTO: ABNORMAL /HPF
SODIUM SERPL-SCNC: 135 MMOL/L (ref 133–144)
SP GR UR STRIP: 1.01 (ref 1–1.03)
SQUAMOUS #/AREA URNS AUTO: ABNORMAL /LPF
UROBILINOGEN UR STRIP-ACNC: 0.2 E.U./DL
WBC # BLD AUTO: 7 10E3/UL (ref 4–11)
WBC #/AREA URNS AUTO: ABNORMAL /HPF

## 2022-05-04 PROCEDURE — 99214 OFFICE O/P EST MOD 30 MIN: CPT | Performed by: FAMILY MEDICINE

## 2022-05-04 PROCEDURE — 80307 DRUG TEST PRSMV CHEM ANLYZR: CPT | Performed by: FAMILY MEDICINE

## 2022-05-04 PROCEDURE — 80069 RENAL FUNCTION PANEL: CPT

## 2022-05-04 PROCEDURE — 81001 URINALYSIS AUTO W/SCOPE: CPT

## 2022-05-04 PROCEDURE — 82306 VITAMIN D 25 HYDROXY: CPT

## 2022-05-04 PROCEDURE — 36415 COLL VENOUS BLD VENIPUNCTURE: CPT

## 2022-05-04 PROCEDURE — 84156 ASSAY OF PROTEIN URINE: CPT

## 2022-05-04 PROCEDURE — 83036 HEMOGLOBIN GLYCOSYLATED A1C: CPT

## 2022-05-04 PROCEDURE — 83970 ASSAY OF PARATHORMONE: CPT

## 2022-05-04 PROCEDURE — 85027 COMPLETE CBC AUTOMATED: CPT

## 2022-05-04 RX ORDER — LANCETS 33 GAUGE
EACH MISCELLANEOUS
Qty: 100 EACH | Refills: 0 | Status: CANCELLED | OUTPATIENT
Start: 2022-05-04

## 2022-05-04 RX ORDER — TRAMADOL HYDROCHLORIDE 50 MG/1
TABLET ORAL
Qty: 180 TABLET | Refills: 0 | Status: SHIPPED | OUTPATIENT
Start: 2022-05-04 | End: 2022-06-07

## 2022-05-04 RX ORDER — NITROGLYCERIN 0.4 MG/1
0.4 TABLET SUBLINGUAL EVERY 5 MIN PRN
Qty: 60 TABLET | Refills: 1 | Status: SHIPPED | OUTPATIENT
Start: 2022-05-04

## 2022-05-04 RX ORDER — BLOOD SUGAR DIAGNOSTIC
STRIP MISCELLANEOUS
Qty: 100 STRIP | Refills: 3 | Status: SHIPPED | OUTPATIENT
Start: 2022-05-04 | End: 2022-12-09

## 2022-05-04 NOTE — LETTER
May 9, 2022      Zahida Parra  [address] [place] [address]        Dear ,    We are writing to inform you of your test results.    Your Urine drug screen was appropriate. No concerns at this time.    Resulted Orders   Urine Drug Confirmation Panel   Result Value Ref Range    O-Meliton-Tramadol ng/mL >13,000 (H) <50 ng/mL    O-Meliton-Tramadol        Comment:      Unable to calculate: Result value above analytical measurement range    Tramadol ng/mL >13,000 (H) <50 ng/mL    Tramadol        Comment:      Unable to calculate: Result value above analytical measurement range    Narrative    Interpretation:  Absent or none detected  This test was developed and its performance characteristics determined by the [facility],  [facility]. It has not been cleared or approved by the FDA. The laboratory is regulated under CLIA as qualified to perform high-complexity testing. This test is used for clinical purposes. It should not be regarded as investigational or for research.   Urine Creatinine for Drug Screen Panel   Result Value Ref Range    Creatinine Urine for Drug Screen 57 mg/dL      Comment:      The reference range has not been established for creatinine in random urines. The results should be integrated into the clinical context for interpretation.       If you have any questions or concerns, please call the clinic at the number listed above.       Sincerely,    Margaret Chandra M.D/julio

## 2022-05-04 NOTE — PATIENT INSTRUCTIONS
Pre-operative medication management   Stop over the counter vitamins and Fish Oils  Stop Asprin/NSAIDS at least 7-10 days prior to surgery.  Do not take Glipizide, Furosemide and Lisinopril the morning of surgery  May take Metoprolol the morning of surgery with small sips of water.

## 2022-05-04 NOTE — PROGRESS NOTES
[facility]  [address]  [place] [address]  Phone: [phone]  Primary Provider: Ector Chandra  Pre-op Performing Provider: ETCOR CHANDRA      PREOPERATIVE EVALUATION:  Today's date: 5/4/2022    Anamaria Parra is a 76 year old female who presents for a preoperative evaluation.    Surgical Information:  Surgery/Procedure: left eye- lens replacement   Surgery Location: [facility] [place]   Surgeon: Dr. Senior  Surgery Date: 5/25/22  Time of Surgery: TBD  Where patient plans to recover: Other: friend will be staying with her  Fax number for surgical facility: [phone]    Type of Anesthesia Anticipated: General    Assessment & Plan     The proposed surgical procedure is considered LOW risk.    Preoperative examination    Cataract with complication of left eye, unspecified complicated cataract type    Type 2 diabetes mellitus with hyperglycemia, without long-term current use of insulin (H)  - blood glucose (ONETOUCH ULTRA) test strip  Dispense: 100 strip; Refill: 3  - blood glucose (NO BRAND SPECIFIED) lancets standard  Dispense: 100 each; Refill: 3    History of Coronary artery disease involving autologous artery coronary bypass graft without angina pectoris, stable  - Refill: nitroGLYcerin (NITROSTAT) 0.4 MG sublingual tablet  Dispense: 60 tablet; Refill: 1      Chronic pain syndrome  - Refill: traMADol (ULTRAM) 50 MG tablet  Dispense: 180 tablet; Refill: 0    Long term (current) use of opiate analgesic  - [id number] - Urine Drug Confirmation Panel (Comprehensive)    PAD (peripheral artery disease) (H) s/p stent placement.   - Uses compression stockings. No concerns.    Alcohol dependence in remission (H)  Has been sober for many years. Last drink was in the 1990s        Risks and Recommendations:  The patient has the following additional risks and recommendations for perioperative complications:   - No identified additional risk factors other than  previously addressed    Medication Instructions:   - ACE/ARB: HOLD due to exceptional risk of hypotension during surgery.    - Diuretics: HOLD on the day of surgery.   - sulfonylurea (e.g. glyburide, glipizide): HOLD day of surgery    RECOMMENDATION:  APPROVAL GIVEN to proceed with proposed procedure, without further diagnostic evaluation.      Subjective     HPI related to upcoming procedure:     76-year-old pleasant female patient of mine here for preop exam.  Reports having bilateral cataracts and had a left cataract extraction with complication, states that the initial lens placed could not hold and is now scheduled for a left lens replacement.  States that she understands the risks and benefits of the procedure and wishes to proceed.    Denies having any concerns today.  Requesting for some med refills and diabetes supplies refills.    1. Yes - Have you ever had a heart attack or stroke?   2. Yes - Have you ever had surgery on your heart or blood vessels, such as a stent, coronary (heart) bypass, or surgery on an artery in the head, neck, heart, or legs?   3. No - Do you have chest pain when you are physically active?  4. No - Do you have a history of heart failure?  5. No - Do you currently have a cold, bronchitis, or symptoms of other respiratory (head and chest) infections?  6. No - Do you have a cough, shortness of breath, or wheezing?  7. No - Do you or anyone in your family have a history of blood clots?  8. No - Do you or anyone in your family have a serious bleeding problem, such as long-lasting bleeding after surgeries or cuts?  9. No - Have you ever had anemia or been told to take iron pills?  10. No - Have you had any abnormal blood loss such as black, tarry or bloody stools, or abnormal vaginal bleeding?  11. No - Have you ever had a blood transfusion?  12. Yes - Are you willing to have a blood transfusion if it is medically needed before, during, or after your surgery?  13. No - Have you or anyone  in your family ever had problems with anesthesia (sedation for surgery)?  14. No - Do you have sleep apnea, excessive snoring, or daytime drowsiness?   15. No - Do you have any artifical heart valves or other implanted medical devices, such as a pacemaker, defibrillator, or continuous glucose monitor?  16. Yes - Do you have any artifical joints? Left shoulder   17. No - Are you allergic to latex?  18. No - Is there any chance that you may be pregnant?        Health Care Directive:  Patient has a Health Care Directive on file      Preoperative Review of :   reviewed - no record of controlled substances prescribed.      Status of Chronic Conditions:  See problem list for active medical problems.  Problems all longstanding and stable, except as noted/documented.  See ROS for pertinent symptoms related to these conditions.      Review of Systems  Constitutional, neuro, ENT, endocrine, pulmonary, cardiac, gastrointestinal, genitourinary, musculoskeletal, integument and psychiatric systems are negative, except as otherwise noted.    Patient Active Problem List    Diagnosis Date Noted     CKD (chronic kidney disease) stage 3, GFR 30-59 ml/min (H) 08/02/2010     Priority: High     Alcohol dependence in remission (H) 05/04/2022     Priority: Medium     CKD (chronic kidney disease) stage 4, GFR 15-29 ml/min (H) 01/12/2022     Priority: Medium     Gastroesophageal reflux disease without esophagitis 01/04/2018     Priority: Medium     S/P coronary artery stent placement 01/04/2018     Priority: Medium     Type 2 diabetes mellitus with diabetic nephropathy, without long-term current use of insulin (H) 12/20/2016     Priority: Medium     Benign essential hypertension 08/02/2016     Priority: Medium     Coronary artery disease involving autologous artery coronary bypass graft without angina pectoris 08/02/2016     Priority: Medium     Chronic pain syndrome 01/27/2016     Priority: Medium     Patient is followed by Data  Unavailable for ongoing prescription of pain medication.  All refills should be approved by this provider, or covering partner.    Medication(s): tramadol 50 mg .   Maximum quantity per month: 180  Clinic visit frequency required: Q 3 months     Controlled substance agreement on file: Yes       Date(s): 2010    Pain Clinic evaluation in the past: No    DIRE Total Score(s):  No flowsheet data found.    Last [facility] website verification:  none; 09/08/2016    [email or web address]         Morbid obesity due to excess calories (H) 11/06/2015     Priority: Medium     ACP (advance care planning) 10/23/2015     Priority: Medium     Advance Care Planning 10/23/2015: ACP Review and Resources Provided:  Reviewed chart for advance care plan.  Anamaria Parra has no plan or code status on file. Discussed available resources and provided with information. Confirmed code status reflects current choices pending further ACP discussions.  Confirmed/documented legally designated decision maker(s). Added by DAYNA Payne  Advance Directive Problem List Overview:   Name Relationship Phone    Primary Health Care Agent            Alternative Health Care Agent          Discussed advance care planning with patient; however, patient declined at this time. 3/1/2012          Ovarian cyst 06/01/2014     Priority: Medium     right       Partial tear of rotator cuff 03/08/2013     Priority: Medium     Problem list name updated by automated process. Provider to review       AC (acromioclavicular) joint arthritis - right 03/08/2013     Priority: Medium     Shoulder impingement - right 03/08/2013     Priority: Medium     Osteoarthritis of shoulder - right 03/08/2013     Priority: Medium     Hyperlipidemia LDL goal <100 03/16/2011     Priority: Medium     Peripheral neuropathy 03/16/2011     Priority: Medium     Hyperthyroidism 08/02/2010     Priority: Medium     Onychomycosis due to dermatophyte      Priority: Medium     (Problem  list name updated by automated process. Provider to review and confirm.)       Psoriasis      Priority: Medium     PAD (peripheral artery disease) (H)      Priority: Medium     Carotid stenosis      Priority: Medium     mild       Spasmodic torticollis      Priority: Medium      Past Medical History:   Diagnosis Date     ASHD (arteriosclerotic heart disease)      Carotid stenosis     mild     Diabetes mellitus      ETOHism (H)     h/o sober 05/13/97     Menopause 98     Onychomycoses      Ovarian cyst 6/2014    right     Overweight, obesity and other hyperalimentation      PAD (peripheral artery disease) (H)      Psoriasis      Rotator cuff tear, right 2/11/2012     Smoker     quit 88     Spasmodic torticollis      Unspecified essential hypertension      Vitamin D deficiencies      Past Surgical History:   Procedure Laterality Date     ANGIOGRAM  02    with 2 stents     ANGIOGRAM  04     ANGIOGRAM  12/2017, 2 stents in RCA    at [facility]      CLOSED RX CLAVICLE FRACTURE  08/07     CORONARY ARTERY BYPASS  01/14/09     TONSILLECTOMY & ADENOIDECTOMY       [facility] ANESTH,SURGERY OF SHOULDER  02/26/07    left shoulder arthroplasty     [facility] HAND/FINGER SURGERY UNLISTED      right thumb deep lac repair     Current Outpatient Medications   Medication Sig Dispense Refill     aspirin 81 MG tablet Take 81 mg by mouth daily       B-12 OR 1000 MCG DAILY       blood glucose (NO BRAND SPECIFIED) lancets standard Use to test blood sugar 1-2 times daily or as directed. 100 each 3     blood glucose (NO BRAND SPECIFIED) lancets standard Use to test blood sugar 3 times daily or as directed. 100 each 3     blood glucose (ONETOUCH ULTRA) test strip TWICE DAILY TESTING OR AS DIRECTED 100 strip 3     blood glucose monitoring (NO BRAND SPECIFIED) meter device kit Use to test blood sugar 2 times daily or as directed. 1 kit 0     FISH OIL 1000 MG OR CAPS 2 TABLETS DAILY       FOLIC ACID OR 3000 MCG DAILY       furosemide (LASIX) 40 MG tablet Take 1  tablet (40 mg) by mouth daily 90 tablet 3     glipiZIDE (GLUCOTROL XL) 10 MG 24 hr tablet Take 1 tablet (10 mg) by mouth daily 90 tablet 3     Lancets (ONETOUCH DELICA PLUS [id number]) MISC USE TO TEST THREE TIMES DAILY. 100 each 0     lisinopril (ZESTRIL) 20 MG tablet Take 1 tablet (20 mg) by mouth daily 90 tablet 3     metoprolol succinate ER (TOPROL-XL) 50 MG 24 hr tablet TAKE 1 AND 1/2 TABLETS(75 MG) BY MOUTH DAILY 135 tablet 3     MULTI-VITAMIN OR TABS 1 TABLET DAILY       nitroGLYcerin (NITROSTAT) 0.4 MG sublingual tablet Place 1 tablet (0.4 mg) under the tongue every 5 minutes as needed for chest pain up to 3 tablets per episode. 60 tablet 1     omeprazole (PRILOSEC) 40 MG DR capsule TAKE ONE CAPSULE BY MOUTH EVERY OTHER DAY 90 capsule 1     rosuvastatin (CRESTOR) 40 MG tablet Take 1 tablet (40 mg) by mouth daily 90 tablet 3     STOOL SOFTENER OR None Entered       traMADol (ULTRAM) 50 MG tablet TAKE 2 TABLETS(100 MG) BY MOUTH EVERY 8 HOURS AS NEEDED FOR SEVERE PAIN. MAX OF 6 TABLETS DAILY 180 tablet 0     VITAMIN D 1000 UNIT OR CAPS 1 CAPSULE DAILY       Wheat Dextrin (BENEFIBER PO)          Allergies   Allergen Reactions     Dilantin [Phenytoin]      rash        Social History     Tobacco Use     Smoking status: Former Smoker     Years: 30.00     Types: Cigarettes     Start date: 1960     Quit date: 1990     Years since quittin.3     Smokeless tobacco: Never Used   Substance Use Topics     Alcohol use: No     Alcohol/week: 0.0 standard drinks     Comment: history of abuse      Family History   Problem Relation Age of Onset     Hypertension Mother      Cerebrovascular Disease Mother      Cardiovascular Father      C.A.D. Brother      Diabetes Brother      Hypertension Brother      Cardiovascular Brother      History   Drug Use No         Objective     /65   Pulse 98   Temp 97.7  F (36.5  C) (Tympanic)   Resp 16   Wt 91.1 kg (200 lb 12.8 oz)   SpO2 97%   Breastfeeding No   BMI 30.08  kg/m      Physical Exam    GENERAL APPEARANCE: healthy, alert and no distress     EYES: EOMI, PERRL     HENT: ear canals and TM's normal and nose and mouth without ulcers or lesions     NECK: no adenopathy, no asymmetry, masses, or scars and thyroid normal to palpation     RESP: lungs clear to auscultation - no rales, rhonchi or wheezes     CV: regular rates and rhythm, normal S1 S2, no S3 or S4 and no murmur, click or rub     ABDOMEN:  soft, nontender, no HSM or masses and bowel sounds normal     MS: extremities normal- no gross deformities noted, no evidence of inflammation in joints, FROM in all extremities.     SKIN: no suspicious lesions or rashes     NEURO: Normal strength and tone, sensory exam grossly normal, mentation intact and speech normal     PSYCH: mentation appears normal. and affect normal/bright     LYMPHATICS: No cervical adenopathy    Recent Labs   Lab Test 01/12/22  1515 12/03/21  1046 12/03/21  1045 11/13/20  0845   HGB 12.9  --  12.6  --      --  267  --     137  --   --    POTASSIUM 4.2 4.4  --   --    CR 1.41* 2.06*  --  1.51*   A1C  --   --  7.1* 7.4*        Diagnostics:  Labs pending at this time.  Results will be reviewed when available.   No EKG required for low risk surgery (cataract, skin procedure, breast biopsy, etc).    Revised Cardiac Risk Index (RCRI):  The patient has the following serious cardiovascular risks for perioperative complications:   - No serious cardiac risks = 0 points     RCRI Interpretation: 0 points: Class I (very low risk - 0.4% complication rate)           Signed Electronically by: Margaret Chandra MD  Copy of this evaluation report is provided to requesting physician.

## 2022-05-06 LAB
N-NORTRAMADOL/CREAT UR CFM: ABNORMAL NG/MG{CREAT}
O-NORTRAMADOL UR CFM-MCNC: ABNORMAL NG/ML
TRAMADOL CTO UR CFM-MCNC: ABNORMAL NG/ML
TRAMADOL/CREAT UR: ABNORMAL

## 2022-05-09 ENCOUNTER — OFFICE VISIT (OUTPATIENT)
Dept: NEPHROLOGY | Facility: CLINIC | Age: 77
End: 2022-05-09
Attending: FAMILY MEDICINE
Payer: COMMERCIAL

## 2022-05-09 VITALS — BODY MASS INDEX: 29.66 KG/M2 | DIASTOLIC BLOOD PRESSURE: 68 MMHG | WEIGHT: 198 LBS | SYSTOLIC BLOOD PRESSURE: 124 MMHG

## 2022-05-09 DIAGNOSIS — N18.4 CKD (CHRONIC KIDNEY DISEASE) STAGE 4, GFR 15-29 ML/MIN (H): ICD-10-CM

## 2022-05-09 DIAGNOSIS — N25.81 SECONDARY RENAL HYPERPARATHYROIDISM (H): ICD-10-CM

## 2022-05-09 DIAGNOSIS — G89.4 CHRONIC PAIN SYNDROME: Primary | ICD-10-CM

## 2022-05-09 DIAGNOSIS — Z95.5 S/P CORONARY ARTERY STENT PLACEMENT: ICD-10-CM

## 2022-05-09 DIAGNOSIS — E11.21 TYPE 2 DIABETES MELLITUS WITH DIABETIC NEPHROPATHY, WITHOUT LONG-TERM CURRENT USE OF INSULIN (H): ICD-10-CM

## 2022-05-09 PROCEDURE — 99204 OFFICE O/P NEW MOD 45 MIN: CPT | Performed by: INTERNAL MEDICINE

## 2022-05-09 NOTE — LETTER
5/9/2022       RE: Anamaria Parra  [address] [place] [address]     Dear Colleague,    Thank you for referring your patient, Anamaria Parra, to the [facility]. Please see a copy of my visit note below.    Assessment and Plan:  76 year old female with history of proteinuric CKD, DM, CAD s/p CABG and stents in recent years who presents for evaluation. Her Scr baseline is 1.4-1.5 and recently up to 1.8-2  # CKD stage 3b, Scr up to 2 in December 2021, improved back to 1.4 - 1.8 (eGFR 31-38ml/min)  - no clear cause for the worsening, no recent NSAIDs or major illness  - she was taken off metformin and put on glipizide  - A1C has increased to 9 but she is doing better with diet.   - discussed SGLT2 inhibitor.  # Hypertension: on metoprolol, furosemide and lisinopril  - goal is 120//80 if tolerated given proteinuric kidney disease and diabetes.   # Anemia in chronic renal disease:   - Hgb: Stable  - Iron studies: Not checked recently    # Electrolytes:   - Potassium; level: Normal  - Bicarbonate; level: Normal    # Mineral Bone Disorder:   - Secondary renal hyperparathyroidism; PTH level is: 97  Vitamin D adequate at 50     Assessment and plan was discussed with patient and she voiced her understanding and agreement.    Consult:  Anamaria Parra was seen in consultation at the request of Dr. Chandra for CKD management.    Reason for Visit:  Anamaria Parra is a 76 year old female with CKD from diabetes , CAD, who presents for evaluation.    HPI:  She is a very pleasant female with history of diabetes, hypertension, CAD s/p CABG and more recently NSTEMI s/p stents at [facility].   She is otherwise doing fairly well.  She was noted by Dr Chandra with elevated creatinine, up to 2 in December 2021.  Dr Chandra stopped her metformin and on repeat creatinine, it did improve to ~1.5  She denies increased swelling , shortness of  breath. She was happy to see the improvement and is trying to avoid NSAIDs, hydrate well.  She has not been checking blood pressure regularly but can do so.    She has lost weight over the last few years by cutting back, and has gone from peak of 250-260 lbs to under 200 lbs currently.  She denies shortness of breath, has swelling at times but has not been significant.   For her blood pressure she is on metoprolol, furosemide daily and lisinopril.  We discussed her kidney function and reviewed her medications.   She takes tramadol for pain, is not taking NSAIDs currently  We did discuss using SGLT2 inhibitors to treat her diabetes as well as her CKD. They will consider this and let us know.    Renal History:   Kidney Disease and Medical Hx:  h/o HTN: Yes   and h/o DM: Yes     ROS:   A comprehensive review of systems was obtained and negative, except as noted in the HPI or PMH.    Active Medical Problems:  Patient Active Problem List   Diagnosis     Onychomycosis due to dermatophyte     Psoriasis     PAD (peripheral artery disease) (H)     Carotid stenosis     Spasmodic torticollis     CKD (chronic kidney disease) stage 3, GFR 30-59 ml/min (H)     Hyperthyroidism     Hyperlipidemia LDL goal <100     Peripheral neuropathy     ACP (advance care planning)     Partial tear of rotator cuff     AC (acromioclavicular) joint arthritis - right     Shoulder impingement - right     Osteoarthritis of shoulder - right     Ovarian cyst     Morbid obesity due to excess calories (H)     Chronic pain syndrome     Benign essential hypertension     Coronary artery disease involving autologous artery coronary bypass graft without angina pectoris     Type 2 diabetes mellitus with diabetic nephropathy, without long-term current use of insulin (H)     Gastroesophageal reflux disease without esophagitis     S/P coronary artery stent placement     CKD (chronic kidney disease) stage 4, GFR 15-29 ml/min (H)     Alcohol dependence in remission  (H)     PMH:   Medical record was reviewed and PMH was discussed with patient and noted below.  Past Medical History:   Diagnosis Date     ASHD (arteriosclerotic heart disease)      Carotid stenosis     mild     Diabetes mellitus      ETOHism (H)     h/o sober 97     Menopause 98     Onychomycoses      Ovarian cyst 2014    right     Overweight, obesity and other hyperalimentation      PAD (peripheral artery disease) (H)      Psoriasis      Rotator cuff tear, right 2012     Smoker     quit 88     Spasmodic torticollis      Unspecified essential hypertension      Vitamin D deficiencies      PSH:   Past Surgical History:   Procedure Laterality Date     ANGIOGRAM  02    with 2 stents     ANGIOGRAM  04     ANGIOGRAM  2017, 2 stents in RCA    at [facility]      CLOSED RX CLAVICLE FRACTURE       CORONARY ARTERY BYPASS  09     TONSILLECTOMY & ADENOIDECTOMY       [facility] ANESTH,SURGERY OF SHOULDER  07    left shoulder arthroplasty     [facility] HAND/FINGER SURGERY UNLISTED      right thumb deep lac repair       Family Hx:   Family History   Problem Relation Age of Onset     Hypertension Mother      Cerebrovascular Disease Mother      Cardiovascular Father      C.A.D. Brother      Diabetes Brother      Hypertension Brother      Cardiovascular Brother      Personal Hx:   Social History     Tobacco Use     Smoking status: Former Smoker     Years: 30.00     Types: Cigarettes     Start date: 1960     Quit date: 1990     Years since quittin.3     Smokeless tobacco: Never Used   Substance Use Topics     Alcohol use: No     Alcohol/week: 0.0 standard drinks     Comment: history of abuse        Allergies:  Allergies   Allergen Reactions     Dilantin [Phenytoin]      rash       Medications:  Current Outpatient Medications   Medication Sig     aspirin 81 MG tablet Take 81 mg by mouth daily     B-12 OR 1000 MCG DAILY     blood glucose (NO BRAND SPECIFIED) lancets standard Use to test blood sugar 1-2 times  daily or as directed.     blood glucose (NO BRAND SPECIFIED) lancets standard Use to test blood sugar 3 times daily or as directed.     blood glucose (ONETOUCH ULTRA) test strip TWICE DAILY TESTING OR AS DIRECTED     blood glucose monitoring (NO BRAND SPECIFIED) meter device kit Use to test blood sugar 2 times daily or as directed.     FISH OIL 1000 MG OR CAPS 2 TABLETS DAILY     FOLIC ACID OR 3000 MCG DAILY     furosemide (LASIX) 40 MG tablet Take 1 tablet (40 mg) by mouth daily     glipiZIDE (GLUCOTROL XL) 10 MG 24 hr tablet Take 1 tablet (10 mg) by mouth daily     Lancets (ONETOUCH DELICA PLUS [id number]) MISC USE TO TEST THREE TIMES DAILY.     lisinopril (ZESTRIL) 20 MG tablet Take 1 tablet (20 mg) by mouth daily     metoprolol succinate ER (TOPROL-XL) 50 MG 24 hr tablet TAKE 1 AND 1/2 TABLETS(75 MG) BY MOUTH DAILY     MULTI-VITAMIN OR TABS 1 TABLET DAILY     nitroGLYcerin (NITROSTAT) 0.4 MG sublingual tablet Place 1 tablet (0.4 mg) under the tongue every 5 minutes as needed for chest pain up to 3 tablets per episode.     omeprazole (PRILOSEC) 40 MG DR capsule TAKE ONE CAPSULE BY MOUTH EVERY OTHER DAY     rosuvastatin (CRESTOR) 40 MG tablet Take 1 tablet (40 mg) by mouth daily     STOOL SOFTENER OR None Entered     traMADol (ULTRAM) 50 MG tablet TAKE 2 TABLETS(100 MG) BY MOUTH EVERY 8 HOURS AS NEEDED FOR SEVERE PAIN. MAX OF 6 TABLETS DAILY     VITAMIN D 1000 UNIT OR CAPS 1 CAPSULE DAILY     Wheat Dextrin (BENEFIBER PO)      No current facility-administered medications for this visit.      Vitals:  /68   Wt 89.8 kg (198 lb)   BMI 29.66 kg/m      Exam:  GENERAL APPEARANCE: alert and no distress  HENT: mouth without ulcers or lesions  LYMPHATICS: no cervical or supraclavicular nodes  RESP: lungs clear to auscultation - no rales, rhonchi or wheezes  CV: regular rhythm, normal rate, no rub, no murmur  EDEMA: no LE edema bilaterally  ABDOMEN: soft, nondistended, nontender, bowel sounds normal  MS: extremities  normal - no gross deformities noted, no evidence of inflammation in joints, no muscle tenderness  SKIN: no rash    LABS:   CMP  Recent Labs   Lab Test 05/04/22  0924 01/12/22  1515 12/03/21  1046 11/13/20  0845 07/22/20  1056 07/02/19  0840 05/21/19  1117    138 137  --  138  --  136   POTASSIUM 5.0 4.2 4.4  --  4.1  --  4.3   CHLORIDE 103 105 103  --  102  --  102   CO2 26 27 25  --  25  --  23   ANIONGAP 6 6 9  --  11  --  11   * 173* 169*  --  119*  --  166*   BUN 36* 32* 45*  --  28  --  33*   CR 1.68* 1.41* 2.06* 1.51* 1.75* 1.36* 1.38*   GFRESTIMATED 31* 38* 23* 33* 28* 38* 37*   GFRESTBLACK  --   --   --  38* 32* 44* 43*   MIKKI 9.5 9.5 9.5  --  8.9  --  9.7     Recent Labs   Lab Test 12/03/21  1046 07/22/20  1056 05/21/19  1117 05/14/18  0834   BILITOTAL 0.4 0.5 0.5 0.4   ALKPHOS 66 70 82 84   ALT 35 27 31 29   AST 11 14 16 19     CBC  Recent Labs   Lab Test 05/04/22  0922 01/12/22  1515 12/03/21  1045 08/13/18  0854   HGB 12.4 12.9 12.6 13.1   WBC 7.0 7.8 8.7 6.0   RBC 4.28 4.31 4.21 4.28   HCT 40.9 41.1 40.4 40.9   MCV 96 95 96 96   MCH 29.0 29.9 29.9 30.6   MCHC 30.3* 31.4* 31.2* 32.0   RDW 13.2 13.0 13.1 13.6    172 267 228     URINE STUDIES  Recent Labs   Lab Test 05/04/22  0751   COLOR Yellow   APPEARANCE Clear   URINEGLC Negative   URINEBILI Negative   URINEKETONE Negative   SG 1.010   UBLD Negative   URINEPH 5.5   PROTEIN Negative   UROBILINOGEN 0.2   NITRITE Negative   LEUKEST Negative   RBCU 0-2   WBCU 0-5     Recent Labs   Lab Test 05/04/22  0751   UTPG 0.58*     PTH  Recent Labs   Lab Test 05/04/22  0924   PTHI 97*     IRON STUDIES  No lab results found.      Esther Johnson MD      Again, thank you for allowing me to participate in the care of your patient.      Sincerely,    Esther Johnson MD

## 2022-05-09 NOTE — LETTER
5/9/2022       RE: Anamaria Parra  [address] [place] [address]     Dear Colleague,    Thank you for referring your patient, Anamaria Parra, to the [facility] [place] at [facility]. Please see a copy of my visit note below.    Assessment and Plan:  76 year old female with history of proteinuric CKD, DM, CAD s/p CABG and stents in recent years who presents for evaluation. Her Scr baseline is 1.4-1.5 and recently up to 1.8-2  # CKD stage 3b, Scr up to 2 in December 2021, improved back to 1.4 - 1.8 (eGFR 31-38ml/min)  - no clear cause for the worsening, no recent NSAIDs or major illness  - she was taken off metformin and put on glipizide  - A1C has increased to 9 but she is doing better with diet.   - discussed SGLT2 inhibitor.  # Hypertension: on metoprolol, furosemide and lisinopril  - goal is 120//80 if tolerated given proteinuric kidney disease and diabetes.   # Anemia in chronic renal disease:   - Hgb: Stable  - Iron studies: Not checked recently    # Electrolytes:   - Potassium; level: Normal  - Bicarbonate; level: Normal    # Mineral Bone Disorder:   - Secondary renal hyperparathyroidism; PTH level is: 97  Vitamin D adequate at 50     Assessment and plan was discussed with patient and she voiced her understanding and agreement.    Consult:  Anamaria Parra was seen in consultation at the request of Dr. Chandra for CKD management.    Reason for Visit:  Anamaria Parra is a 76 year old female with CKD from diabetes , CAD, who presents for evaluation.    HPI:  She is a very pleasant female with history of diabetes, hypertension, CAD s/p CABG and more recently NSTEMI s/p stents at [facility].   She is otherwise doing fairly well.  She was noted by Dr Chandra with elevated creatinine, up to 2 in December 2021.  Dr Chandra stopped her metformin and on repeat creatinine, it did improve to ~1.5  She denies increased swelling , shortness of  breath. She was happy to see the improvement and is trying to avoid NSAIDs, hydrate well.  She has not been checking blood pressure regularly but can do so.    She has lost weight over the last few years by cutting back, and has gone from peak of 250-260 lbs to under 200 lbs currently.  She denies shortness of breath, has swelling at times but has not been significant.   For her blood pressure she is on metoprolol, furosemide daily and lisinopril.  We discussed her kidney function and reviewed her medications.   She takes tramadol for pain, is not taking NSAIDs currently  We did discuss using SGLT2 inhibitors to treat her diabetes as well as her CKD. They will consider this and let us know.    Renal History:   Kidney Disease and Medical Hx:  h/o HTN: Yes   and h/o DM: Yes     ROS:   A comprehensive review of systems was obtained and negative, except as noted in the HPI or PMH.    Active Medical Problems:  Patient Active Problem List   Diagnosis     Onychomycosis due to dermatophyte     Psoriasis     PAD (peripheral artery disease) (H)     Carotid stenosis     Spasmodic torticollis     CKD (chronic kidney disease) stage 3, GFR 30-59 ml/min (H)     Hyperthyroidism     Hyperlipidemia LDL goal <100     Peripheral neuropathy     ACP (advance care planning)     Partial tear of rotator cuff     AC (acromioclavicular) joint arthritis - right     Shoulder impingement - right     Osteoarthritis of shoulder - right     Ovarian cyst     Morbid obesity due to excess calories (H)     Chronic pain syndrome     Benign essential hypertension     Coronary artery disease involving autologous artery coronary bypass graft without angina pectoris     Type 2 diabetes mellitus with diabetic nephropathy, without long-term current use of insulin (H)     Gastroesophageal reflux disease without esophagitis     S/P coronary artery stent placement     CKD (chronic kidney disease) stage 4, GFR 15-29 ml/min (H)     Alcohol dependence in remission  (H)     PMH:   Medical record was reviewed and PMH was discussed with patient and noted below.  Past Medical History:   Diagnosis Date     ASHD (arteriosclerotic heart disease)      Carotid stenosis     mild     Diabetes mellitus      ETOHism (H)     h/o sober 97     Menopause 89     Onychomycoses      Ovarian cyst 2014    right     Overweight, obesity and other hyperalimentation      PAD (peripheral artery disease) (H)      Psoriasis      Rotator cuff tear, right 2012     Smoker     quit 88     Spasmodic torticollis      Unspecified essential hypertension      Vitamin D deficiencies      PSH:   Past Surgical History:   Procedure Laterality Date     ANGIOGRAM  02    with 2 stents     ANGIOGRAM  04     ANGIOGRAM  2017, 2 stents in RCA    at [facility]      CLOSED RX CLAVICLE FRACTURE       CORONARY ARTERY BYPASS  09     TONSILLECTOMY & ADENOIDECTOMY       RUST ANESTH,SURGERY OF SHOULDER  07    left shoulder arthroplasty     RUST HAND/FINGER SURGERY UNLISTED      right thumb deep lac repair       Family Hx:   Family History   Problem Relation Age of Onset     Hypertension Mother      Cerebrovascular Disease Mother      Cardiovascular Father      C.A.D. Brother      Diabetes Brother      Hypertension Brother      Cardiovascular Brother      Personal Hx:   Social History     Tobacco Use     Smoking status: Former Smoker     Years: 30.00     Types: Cigarettes     Start date: 1960     Quit date: 1990     Years since quittin.3     Smokeless tobacco: Never Used   Substance Use Topics     Alcohol use: No     Alcohol/week: 0.0 standard drinks     Comment: history of abuse        Allergies:  Allergies   Allergen Reactions     Dilantin [Phenytoin]      rash       Medications:  Current Outpatient Medications   Medication Sig     aspirin 81 MG tablet Take 81 mg by mouth daily     B-12 OR 1000 MCG DAILY     blood glucose (NO BRAND SPECIFIED) lancets standard Use to test blood sugar 1-2 times  daily or as directed.     blood glucose (NO BRAND SPECIFIED) lancets standard Use to test blood sugar 3 times daily or as directed.     blood glucose (ONETOUCH ULTRA) test strip TWICE DAILY TESTING OR AS DIRECTED     blood glucose monitoring (NO BRAND SPECIFIED) meter device kit Use to test blood sugar 2 times daily or as directed.     FISH OIL 1000 MG OR CAPS 2 TABLETS DAILY     FOLIC ACID OR 3000 MCG DAILY     furosemide (LASIX) 40 MG tablet Take 1 tablet (40 mg) by mouth daily     glipiZIDE (GLUCOTROL XL) 10 MG 24 hr tablet Take 1 tablet (10 mg) by mouth daily     Lancets (ONETOUCH DELICA PLUS [id number]) MISC USE TO TEST THREE TIMES DAILY.     lisinopril (ZESTRIL) 20 MG tablet Take 1 tablet (20 mg) by mouth daily     metoprolol succinate ER (TOPROL-XL) 50 MG 24 hr tablet TAKE 1 AND 1/2 TABLETS(75 MG) BY MOUTH DAILY     MULTI-VITAMIN OR TABS 1 TABLET DAILY     nitroGLYcerin (NITROSTAT) 0.4 MG sublingual tablet Place 1 tablet (0.4 mg) under the tongue every 5 minutes as needed for chest pain up to 3 tablets per episode.     omeprazole (PRILOSEC) 40 MG DR capsule TAKE ONE CAPSULE BY MOUTH EVERY OTHER DAY     rosuvastatin (CRESTOR) 40 MG tablet Take 1 tablet (40 mg) by mouth daily     STOOL SOFTENER OR None Entered     traMADol (ULTRAM) 50 MG tablet TAKE 2 TABLETS(100 MG) BY MOUTH EVERY 8 HOURS AS NEEDED FOR SEVERE PAIN. MAX OF 6 TABLETS DAILY     VITAMIN D 1000 UNIT OR CAPS 1 CAPSULE DAILY     Wheat Dextrin (BENEFIBER PO)      No current facility-administered medications for this visit.      Vitals:  /68   Wt 89.8 kg (198 lb)   BMI 29.66 kg/m      Exam:  GENERAL APPEARANCE: alert and no distress  HENT: mouth without ulcers or lesions  LYMPHATICS: no cervical or supraclavicular nodes  RESP: lungs clear to auscultation - no rales, rhonchi or wheezes  CV: regular rhythm, normal rate, no rub, no murmur  EDEMA: no LE edema bilaterally  ABDOMEN: soft, nondistended, nontender, bowel sounds normal  MS: extremities  normal - no gross deformities noted, no evidence of inflammation in joints, no muscle tenderness  SKIN: no rash    LABS:   CMP  Recent Labs   Lab Test 05/04/22  0924 01/12/22  1515 12/03/21  1046 11/13/20  0845 07/22/20  1056 07/02/19  0840 05/21/19  1117    138 137  --  138  --  136   POTASSIUM 5.0 4.2 4.4  --  4.1  --  4.3   CHLORIDE 103 105 103  --  102  --  102   CO2 26 27 25  --  25  --  23   ANIONGAP 6 6 9  --  11  --  11   * 173* 169*  --  119*  --  166*   BUN 36* 32* 45*  --  28  --  33*   CR 1.68* 1.41* 2.06* 1.51* 1.75* 1.36* 1.38*   GFRESTIMATED 31* 38* 23* 33* 28* 38* 37*   GFRESTBLACK  --   --   --  38* 32* 44* 43*   MIKKI 9.5 9.5 9.5  --  8.9  --  9.7     Recent Labs   Lab Test 12/03/21  1046 07/22/20  1056 05/21/19  1117 05/14/18  0834   BILITOTAL 0.4 0.5 0.5 0.4   ALKPHOS 66 70 82 84   ALT 35 27 31 29   AST 11 14 16 19     CBC  Recent Labs   Lab Test 05/04/22  0922 01/12/22  1515 12/03/21  1045 08/13/18  0854   HGB 12.4 12.9 12.6 13.1   WBC 7.0 7.8 8.7 6.0   RBC 4.28 4.31 4.21 4.28   HCT 40.9 41.1 40.4 40.9   MCV 96 95 96 96   MCH 29.0 29.9 29.9 30.6   MCHC 30.3* 31.4* 31.2* 32.0   RDW 13.2 13.0 13.1 13.6    172 267 228     URINE STUDIES  Recent Labs   Lab Test 05/04/22  0751   COLOR Yellow   APPEARANCE Clear   URINEGLC Negative   URINEBILI Negative   URINEKETONE Negative   SG 1.010   UBLD Negative   URINEPH 5.5   PROTEIN Negative   UROBILINOGEN 0.2   NITRITE Negative   LEUKEST Negative   RBCU 0-2   WBCU 0-5     Recent Labs   Lab Test 05/04/22  0751   UTPG 0.58*     PTH  Recent Labs   Lab Test 05/04/22  0924   PTHI 97*     IRON STUDIES  No lab results found.      Esther Johnson MD

## 2022-05-09 NOTE — PATIENT INSTRUCTIONS
SGLT2 inhibitors (canagliflozin, empagliflozin, dapagliflozin)  - ? Insurance    Dietary Approaches to Stop Hypertension (The DASH Diet)   What is hypertension?   Hypertension is the term for blood pressure that is consistently higher than normal. Blood pressure is the force of blood against artery walls. Blood pressure can be unhealthy if it is above 120/80. The higher your blood pressure, the greater the health risk.     High blood pressure can be controlled if you take these steps:   Maintain a healthy weight.   Be physically active.   Follow a healthy eating plan, which includes foods lower in salt and sodium.   If you drink alcoholic beverages, do so in moderation.   As noted in this list, diet affects high blood pressure. Following the DASH diet and reducing the amount of sodium in your diet will help lower your blood pressure. It will also help prevent high blood pressure.     What is the DASH diet?   Dietary Approaches to Stop Hypertension (DASH) is a diet that is low in saturated fat, cholesterol, and total fat. It emphasizes fruits, vegetables, and low-fat dairy foods. The DASH diet also includes whole-grain products, fish, poultry, and nuts. It encourages fewer servings of red meat, sweets, and sugar-containing beverages. It is rich in magnesium, potassium, and calcium, as well as protein and fiber.     How do I get started on the DASH diet?   The DASH diet requires no special foods and has no hard-to-follow recipes. Start by seeing how DASH compares with your current eating habits.  The DASH eating plan shown is based on 2,000 calories a day. Your health care provider or a dietitian can help you determine how many calories a day you need. Most adults need somewhere between 1600 and 2800 calories a day. Serving sizes will vary between 1/2 cup and 1 1/4 cups.     Check the product's nutrition label to determine serving sizes of particular products.    Food Group   Number of Servings   Examples of Serving  Size    Grains and grain products   7 to 8   1 slice of bread    1 cup ready-to-eat cold cereal    1/2 cup cooked rice, pasta, or   cereal    Vegetables   4 to 5   1 cup raw leafy vegetable    1/2 cup cooked vegetable    6 oz. vegetable juice    Fruits   4 to 5   1 medium fruit       1/4 cup dried fruit    1/2 cup fresh, frozen, or canned fruit    6 oz fruit juice    Low-fat or fat-free dairy foods   2 to 3   8 oz milk    1 cup yogurt    1 1/2 oz cheese    Lean meats, poultry, or fish   2 or fewer   3 oz cooked lean meat, skinless poultry, or fish    Nuts, seeds, and dry beans   4 to 5 per week   1/3 cup or 1 1/2 oz nuts    1 tablespoon or 1/2 oz seeds    1/2 cup cooked dry beans     Fats and oils   2 to 3   1 teaspoon soft margarine    1 tablespoon low-fat mayonnaise    2 tablespoons light salad dressing    1 teaspoon vegetable oil   Sweets   5 per week   1 tablespoon sugar              8 oz lemonade    1 tablespoon jelly or jam     1/2 oz jelly beans     Make changes gradually. Here are some suggestions that might help:     If you now eat 1 or 2 servings of vegetables a day, add a serving at lunch and another at dinner.   If you don't eat fruit now or have only juice at breakfast, add a serving to your meals or have it as a snack.   Drink milk or water with lunch or dinner instead of soda, sugar-sweetened tea, or alcohol. Choose low-fat (1%) or fat-free (skim) dairy products to reduce how much saturated fat, total fat, cholesterol, and calories you eat. If you have trouble digesting dairy products, try taking lactase enzyme pills or drops (available at drugstores and groceries) with the dairy foods. Or buy lactose-free milk or milk with lactase enzyme added to it.   Read food labels on margarines and salad dressings to choose products lowest in fat.   If you now eat large portions of meat, cut back gradually--by a half or a third at each meal. Limit meat to 6 ounces a day (2 servings). Three to four ounces is  about the size of a deck of cards.   Have 2 or more vegetarian-style (meatless) meals each week. Increase servings of vegetables, rice, pasta, and beans in all meals. Try casseroles and pasta, and stir-bray dishes, which have less meat and more vegetables, grains, and beans.   Use fruits canned in their own juice. Fresh fruits require little or no preparation. Dried fruits are a good choice to carry with you or to have ready in the car.   Try these snacks ideas: unsalted pretzels or nuts mixed with raisins, cassius crackers, low-fat and fat-free yogurt and frozen yogurt, popcorn with no salt or butter added, and raw vegetables.   Choose whole grain foods to get more nutrients, including minerals and fiber. For example, choose whole-wheat bread or whole-grain cereals.   Use fresh, frozen, or no-salt-added canned vegetables.     Remember to also reduce the salt and sodium in your diet. Try to have no more than 2000 milligrams (mg) of sodium per day, with a goal of further reducing the sodium to 1500 mg per day. Three important ways to reduce sodium are:     Use reduced-sodium or no-salt-added food products.   Use less salt when you prepare foods and do not add salt to your food at the table.   Read fool labels. Aim for foods that are less than 5 percent of the daily value of sodium.     The DASH eating plan was not designed for weight loss. But it contains many lower calorie foods, such as fruits and vegetables. You can make it lower in calories by replacing higher calorie foods with more fruits and vegetables. Some ideas to increase fruits and vegetables and decrease calories include:     Eat a medium apple instead of four shortbread cookies. You'll save 80 calories.   Eat 1/4 cup of dried apricots instead of a 2-ounce bag of pork rinds. You'll save 230 calories.   Have a hamburger that's 3 ounces instead of 6 ounces. Add a 1/2 cup serving of carrots and a 1/2 cup serving of spinach. You'll save more than 200 calories.    Instead of 5 ounces of chicken, have a stir bray with 2 ounces of chicken and 1 and 1/2 cups of raw vegetables. Use a small amount of vegetable oil. You'll save 50 calories.   Have a 1/2 cup serving of low-fat frozen yogurt instead of a 1 and 1/2 ounce milk chocolate bar. You'll save about 110 calories.   Use low-fat or fat-free condiments, such as fat free salad dressings.   Eat smaller portions--cut back gradually.   Use food labels to compare fat content in packaged foods. Items marked low-fat or fat-free may be lower in fat without being lower in calories than their regular versions.   Limit foods with lots of added sugar, such as pies, flavored yogurts, candy bars, ice cream, sherbet, regular soft drinks, and fruit drinks.   Drink water or club soda instead of cola or other soda drinks.     Based on National Institutes of Health Guidelines. Published by [employer].   Copyright   2004 [employer] and/or one of its subsidiaries. All Rights Reserved.

## 2022-05-09 NOTE — PROGRESS NOTES
Assessment and Plan:  76 year old female with history of proteinuric CKD, DM, CAD s/p CABG and stents in recent years who presents for evaluation. Her Scr baseline is 1.4-1.5 and recently up to 1.8-2  # CKD stage 3b, Scr up to 2 in December 2021, improved back to 1.4 - 1.8 (eGFR 31-38ml/min)  - no clear cause for the worsening, no recent NSAIDs or major illness  - she was taken off metformin and put on glipizide  - A1C has increased to 9 but she is doing better with diet.   - discussed SGLT2 inhibitor.  # Hypertension: on metoprolol, furosemide and lisinopril  - goal is 120//80 if tolerated given proteinuric kidney disease and diabetes.   # Anemia in chronic renal disease:   - Hgb: Stable  - Iron studies: Not checked recently    # Electrolytes:   - Potassium; level: Normal  - Bicarbonate; level: Normal    # Mineral Bone Disorder:   - Secondary renal hyperparathyroidism; PTH level is: 97  Vitamin D adequate at 50     Assessment and plan was discussed with patient and she voiced her understanding and agreement.    Consult:  Anamaria Parra was seen in consultation at the request of Dr. Chandra for CKD management.    Reason for Visit:  Anamaria Parra is a 76 year old female with CKD from diabetes , CAD, who presents for evaluation.    HPI:  She is a very pleasant female with history of diabetes, hypertension, CAD s/p CABG and more recently NSTEMI s/p stents at [facility].   She is otherwise doing fairly well.  She was noted by Dr Chandra with elevated creatinine, up to 2 in December 2021.  Dr Chandra stopped her metformin and on repeat creatinine, it did improve to ~1.5  She denies increased swelling , shortness of breath. She was happy to see the improvement and is trying to avoid NSAIDs, hydrate well.  She has not been checking blood pressure regularly but can do so.    She has lost weight over the last few years by cutting back, and has gone from peak of 250-260 lbs to under 200 lbs currently.  She denies  shortness of breath, has swelling at times but has not been significant.   For her blood pressure she is on metoprolol, furosemide daily and lisinopril.  We discussed her kidney function and reviewed her medications.   She takes tramadol for pain, is not taking NSAIDs currently  We did discuss using SGLT2 inhibitors to treat her diabetes as well as her CKD. They will consider this and let us know.    Renal History:   Kidney Disease and Medical Hx:  h/o HTN: Yes   and h/o DM: Yes     ROS:   A comprehensive review of systems was obtained and negative, except as noted in the HPI or PMH.    Active Medical Problems:  Patient Active Problem List   Diagnosis     Onychomycosis due to dermatophyte     Psoriasis     PAD (peripheral artery disease) (H)     Carotid stenosis     Spasmodic torticollis     CKD (chronic kidney disease) stage 3, GFR 30-59 ml/min (H)     Hyperthyroidism     Hyperlipidemia LDL goal <100     Peripheral neuropathy     ACP (advance care planning)     Partial tear of rotator cuff     AC (acromioclavicular) joint arthritis - right     Shoulder impingement - right     Osteoarthritis of shoulder - right     Ovarian cyst     Morbid obesity due to excess calories (H)     Chronic pain syndrome     Benign essential hypertension     Coronary artery disease involving autologous artery coronary bypass graft without angina pectoris     Type 2 diabetes mellitus with diabetic nephropathy, without long-term current use of insulin (H)     Gastroesophageal reflux disease without esophagitis     S/P coronary artery stent placement     CKD (chronic kidney disease) stage 4, GFR 15-29 ml/min (H)     Alcohol dependence in remission (H)     PMH:   Medical record was reviewed and PMH was discussed with patient and noted below.  Past Medical History:   Diagnosis Date     ASHD (arteriosclerotic heart disease)      Carotid stenosis     mild     Diabetes mellitus      ETOHism (H)     h/o sober 05/13/97     Menopause 98      Onychomycoses      Ovarian cyst 2014    right     Overweight, obesity and other hyperalimentation      PAD (peripheral artery disease) (H)      Psoriasis      Rotator cuff tear, right 2012     Smoker     quit 88     Spasmodic torticollis      Unspecified essential hypertension      Vitamin D deficiencies      PSH:   Past Surgical History:   Procedure Laterality Date     ANGIOGRAM  02    with 2 stents     ANGIOGRAM  04     ANGIOGRAM  2017, 2 stents in RCA    at [facility]      CLOSED RX CLAVICLE FRACTURE       CORONARY ARTERY BYPASS  09     TONSILLECTOMY & ADENOIDECTOMY       [facility] ANESTH,SURGERY OF SHOULDER  07    left shoulder arthroplasty     [facility] HAND/FINGER SURGERY UNLISTED      right thumb deep lac repair       Family Hx:   Family History   Problem Relation Age of Onset     Hypertension Mother      Cerebrovascular Disease Mother      Cardiovascular Father      C.A.D. Brother      Diabetes Brother      Hypertension Brother      Cardiovascular Brother      Personal Hx:   Social History     Tobacco Use     Smoking status: Former Smoker     Years: 30.00     Types: Cigarettes     Start date: 1960     Quit date: 1990     Years since quittin.3     Smokeless tobacco: Never Used   Substance Use Topics     Alcohol use: No     Alcohol/week: 0.0 standard drinks     Comment: history of abuse        Allergies:  Allergies   Allergen Reactions     Dilantin [Phenytoin]      rash       Medications:  Current Outpatient Medications   Medication Sig     aspirin 81 MG tablet Take 81 mg by mouth daily     B-12 OR 1000 MCG DAILY     blood glucose (NO BRAND SPECIFIED) lancets standard Use to test blood sugar 1-2 times daily or as directed.     blood glucose (NO BRAND SPECIFIED) lancets standard Use to test blood sugar 3 times daily or as directed.     blood glucose (ONETOUCH ULTRA) test strip TWICE DAILY TESTING OR AS DIRECTED     blood glucose monitoring (NO BRAND SPECIFIED) meter device kit Use to test  blood sugar 2 times daily or as directed.     FISH OIL 1000 MG OR CAPS 2 TABLETS DAILY     FOLIC ACID OR 3000 MCG DAILY     furosemide (LASIX) 40 MG tablet Take 1 tablet (40 mg) by mouth daily     glipiZIDE (GLUCOTROL XL) 10 MG 24 hr tablet Take 1 tablet (10 mg) by mouth daily     Lancets (ONETOUCH DELICA PLUS [id number]) MISC USE TO TEST THREE TIMES DAILY.     lisinopril (ZESTRIL) 20 MG tablet Take 1 tablet (20 mg) by mouth daily     metoprolol succinate ER (TOPROL-XL) 50 MG 24 hr tablet TAKE 1 AND 1/2 TABLETS(75 MG) BY MOUTH DAILY     MULTI-VITAMIN OR TABS 1 TABLET DAILY     nitroGLYcerin (NITROSTAT) 0.4 MG sublingual tablet Place 1 tablet (0.4 mg) under the tongue every 5 minutes as needed for chest pain up to 3 tablets per episode.     omeprazole (PRILOSEC) 40 MG DR capsule TAKE ONE CAPSULE BY MOUTH EVERY OTHER DAY     rosuvastatin (CRESTOR) 40 MG tablet Take 1 tablet (40 mg) by mouth daily     STOOL SOFTENER OR None Entered     traMADol (ULTRAM) 50 MG tablet TAKE 2 TABLETS(100 MG) BY MOUTH EVERY 8 HOURS AS NEEDED FOR SEVERE PAIN. MAX OF 6 TABLETS DAILY     VITAMIN D 1000 UNIT OR CAPS 1 CAPSULE DAILY     Wheat Dextrin (BENEFIBER PO)      No current facility-administered medications for this visit.      Vitals:  /68   Wt 89.8 kg (198 lb)   BMI 29.66 kg/m      Exam:  GENERAL APPEARANCE: alert and no distress  HENT: mouth without ulcers or lesions  LYMPHATICS: no cervical or supraclavicular nodes  RESP: lungs clear to auscultation - no rales, rhonchi or wheezes  CV: regular rhythm, normal rate, no rub, no murmur  EDEMA: no LE edema bilaterally  ABDOMEN: soft, nondistended, nontender, bowel sounds normal  MS: extremities normal - no gross deformities noted, no evidence of inflammation in joints, no muscle tenderness  SKIN: no rash    LABS:   CMP  Recent Labs   Lab Test 05/04/22  0924 01/12/22  1515 12/03/21  1046 11/13/20  0845 07/22/20  1056 07/02/19  0840 05/21/19  1117    138 137  --  138  --  136    POTASSIUM 5.0 4.2 4.4  --  4.1  --  4.3   CHLORIDE 103 105 103  --  102  --  102   CO2 26 27 25  --  25  --  23   ANIONGAP 6 6 9  --  11  --  11   * 173* 169*  --  119*  --  166*   BUN 36* 32* 45*  --  28  --  33*   CR 1.68* 1.41* 2.06* 1.51* 1.75* 1.36* 1.38*   GFRESTIMATED 31* 38* 23* 33* 28* 38* 37*   GFRESTBLACK  --   --   --  38* 32* 44* 43*   MIKKI 9.5 9.5 9.5  --  8.9  --  9.7     Recent Labs   Lab Test 12/03/21  1046 07/22/20  1056 05/21/19  1117 05/14/18  0834   BILITOTAL 0.4 0.5 0.5 0.4   ALKPHOS 66 70 82 84   ALT 35 27 31 29   AST 11 14 16 19     CBC  Recent Labs   Lab Test 05/04/22  0922 01/12/22  1515 12/03/21  1045 08/13/18  0854   HGB 12.4 12.9 12.6 13.1   WBC 7.0 7.8 8.7 6.0   RBC 4.28 4.31 4.21 4.28   HCT 40.9 41.1 40.4 40.9   MCV 96 95 96 96   MCH 29.0 29.9 29.9 30.6   MCHC 30.3* 31.4* 31.2* 32.0   RDW 13.2 13.0 13.1 13.6    172 267 228     URINE STUDIES  Recent Labs   Lab Test 05/04/22  0751   COLOR Yellow   APPEARANCE Clear   URINEGLC Negative   URINEBILI Negative   URINEKETONE Negative   SG 1.010   UBLD Negative   URINEPH 5.5   PROTEIN Negative   UROBILINOGEN 0.2   NITRITE Negative   LEUKEST Negative   RBCU 0-2   WBCU 0-5     Recent Labs   Lab Test 05/04/22  0751   UTPG 0.58*     PTH  Recent Labs   Lab Test 05/04/22  0924   PTHI 97*     IRON STUDIES  No lab results found.      Esther Johnson MD

## 2022-07-01 ENCOUNTER — TRANSFERRED RECORDS (OUTPATIENT)
Dept: HEALTH INFORMATION MANAGEMENT | Facility: CLINIC | Age: 77
End: 2022-07-01

## 2022-07-05 ENCOUNTER — TELEPHONE (OUTPATIENT)
Dept: FAMILY MEDICINE | Facility: CLINIC | Age: 77
End: 2022-07-05

## 2022-07-05 DIAGNOSIS — G89.4 CHRONIC PAIN SYNDROME: ICD-10-CM

## 2022-07-05 RX ORDER — TRAMADOL HYDROCHLORIDE 50 MG/1
TABLET ORAL
Qty: 180 TABLET | Refills: 0 | Status: SHIPPED | OUTPATIENT
Start: 2022-07-05 | End: 2022-08-03

## 2022-07-05 NOTE — TELEPHONE ENCOUNTER
Routing refill request to provider for review/approval because:  Drug not on the [facility] refill protocol.  Last Written Prescription Date:  6/7/22  Last Fill Quantity: 180,  # refills: 0   Last office visit: 5/4/2022 with prescribing provider:   Future Office Visit:

## 2022-07-05 NOTE — TELEPHONE ENCOUNTER
Patient requesting prescription for traMADol (ULTRAM) 50 MG tablet to be transferred to [facility].

## 2022-07-08 ENCOUNTER — TRANSFERRED RECORDS (OUTPATIENT)
Dept: FAMILY MEDICINE | Facility: CLINIC | Age: 77
End: 2022-07-08

## 2022-08-12 ENCOUNTER — TRANSFERRED RECORDS (OUTPATIENT)
Dept: HEALTH INFORMATION MANAGEMENT | Facility: CLINIC | Age: 77
End: 2022-08-12

## 2022-10-12 ENCOUNTER — TELEPHONE (OUTPATIENT)
Dept: FAMILY MEDICINE | Facility: CLINIC | Age: 77
End: 2022-10-12

## 2022-10-12 NOTE — TELEPHONE ENCOUNTER
"Patient reports that her eye doctor started her on a medication  \"a water pill\" that relieves e the eye pressure.  Acetazolamide 2 mg bid.  She is to return in 1 week to have eye pressure recheck.    Patient reports she is also on lasix daily and did say this to her eye doctor, but wants to ask Dr. Chandra if it is okay to take both meds?    Please advise in provider's absence,  Heather Gonzalez RN  [facility]    "

## 2022-10-13 NOTE — TELEPHONE ENCOUNTER
Covering provider.  Patient's records and blood pressure reviewed.  The acetazolamide prescribed is most likely for glaucoma.  May take acetazolamide and furosemide.  However, if she noticed low blood pressure readings (<100/60) or dizziness symptoms, please call us back.

## 2022-10-13 NOTE — TELEPHONE ENCOUNTER
Patient notified of below and voiced understanding and agreement.  Heather Gonzalez RN  [facility]

## 2022-10-30 DIAGNOSIS — G89.4 CHRONIC PAIN SYNDROME: ICD-10-CM

## 2022-11-02 RX ORDER — TRAMADOL HYDROCHLORIDE 50 MG/1
TABLET ORAL
Qty: 180 TABLET | Refills: 0 | Status: SHIPPED | OUTPATIENT
Start: 2022-11-02 | End: 2022-12-05

## 2022-12-01 DIAGNOSIS — G89.4 CHRONIC PAIN SYNDROME: ICD-10-CM

## 2022-12-05 RX ORDER — TRAMADOL HYDROCHLORIDE 50 MG/1
TABLET ORAL
Qty: 180 TABLET | Refills: 0 | Status: SHIPPED | OUTPATIENT
Start: 2022-12-05 | End: 2023-01-04

## 2022-12-09 ENCOUNTER — OFFICE VISIT (OUTPATIENT)
Dept: FAMILY MEDICINE | Facility: CLINIC | Age: 77
End: 2022-12-09
Payer: COMMERCIAL

## 2022-12-09 VITALS
WEIGHT: 189 LBS | BODY MASS INDEX: 28.32 KG/M2 | DIASTOLIC BLOOD PRESSURE: 64 MMHG | HEART RATE: 93 BPM | OXYGEN SATURATION: 100 % | RESPIRATION RATE: 18 BRPM | SYSTOLIC BLOOD PRESSURE: 134 MMHG | TEMPERATURE: 97.6 F

## 2022-12-09 DIAGNOSIS — N18.4 CKD (CHRONIC KIDNEY DISEASE) STAGE 4, GFR 15-29 ML/MIN (H): ICD-10-CM

## 2022-12-09 DIAGNOSIS — Z78.0 POSTMENOPAUSAL ESTROGEN DEFICIENCY: ICD-10-CM

## 2022-12-09 DIAGNOSIS — E11.21 TYPE 2 DIABETES MELLITUS WITH DIABETIC NEPHROPATHY, WITHOUT LONG-TERM CURRENT USE OF INSULIN (H): ICD-10-CM

## 2022-12-09 DIAGNOSIS — E78.5 HYPERLIPIDEMIA LDL GOAL <100: ICD-10-CM

## 2022-12-09 DIAGNOSIS — I10 HYPERTENSION GOAL BP (BLOOD PRESSURE) < 140/90: ICD-10-CM

## 2022-12-09 DIAGNOSIS — E11.65 TYPE 2 DIABETES MELLITUS WITH HYPERGLYCEMIA, WITHOUT LONG-TERM CURRENT USE OF INSULIN (H): Primary | ICD-10-CM

## 2022-12-09 DIAGNOSIS — K21.9 GASTROESOPHAGEAL REFLUX DISEASE WITHOUT ESOPHAGITIS: ICD-10-CM

## 2022-12-09 DIAGNOSIS — I25.810 CORONARY ARTERY DISEASE INVOLVING AUTOLOGOUS ARTERY CORONARY BYPASS GRAFT WITHOUT ANGINA PECTORIS: ICD-10-CM

## 2022-12-09 DIAGNOSIS — Z23 NEED FOR VACCINATION: ICD-10-CM

## 2022-12-09 LAB
ALBUMIN SERPL-MCNC: 3.6 G/DL (ref 3.4–5)
ALP SERPL-CCNC: 87 U/L (ref 40–150)
ALT SERPL W P-5'-P-CCNC: 34 U/L (ref 0–50)
ANION GAP SERPL CALCULATED.3IONS-SCNC: 10 MMOL/L (ref 3–14)
AST SERPL W P-5'-P-CCNC: 17 U/L (ref 0–45)
BILIRUB SERPL-MCNC: 0.4 MG/DL (ref 0.2–1.3)
BUN SERPL-MCNC: 44 MG/DL (ref 7–30)
CALCIUM SERPL-MCNC: 9.3 MG/DL (ref 8.5–10.1)
CHLORIDE BLD-SCNC: 106 MMOL/L (ref 94–109)
CHOLEST SERPL-MCNC: 109 MG/DL
CO2 SERPL-SCNC: 22 MMOL/L (ref 20–32)
CREAT SERPL-MCNC: 1.76 MG/DL (ref 0.52–1.04)
CREAT UR-MCNC: 30 MG/DL
FASTING STATUS PATIENT QL REPORTED: YES
GFR SERPL CREATININE-BSD FRML MDRD: 29 ML/MIN/1.73M2
GLUCOSE BLD-MCNC: 149 MG/DL (ref 70–99)
HBA1C MFR BLD: 8.2 % (ref 0–5.6)
HDLC SERPL-MCNC: 45 MG/DL
HGB BLD-MCNC: 12.6 G/DL (ref 11.7–15.7)
LDLC SERPL CALC-MCNC: 39 MG/DL
MICROALBUMIN UR-MCNC: 47 MG/L
MICROALBUMIN/CREAT UR: 156.67 MG/G CR (ref 0–25)
NONHDLC SERPL-MCNC: 64 MG/DL
POTASSIUM BLD-SCNC: 4.2 MMOL/L (ref 3.4–5.3)
PROT SERPL-MCNC: 7.6 G/DL (ref 6.8–8.8)
SODIUM SERPL-SCNC: 138 MMOL/L (ref 133–144)
TRIGL SERPL-MCNC: 127 MG/DL
TSH SERPL DL<=0.005 MIU/L-ACNC: 0.81 MU/L (ref 0.4–4)

## 2022-12-09 PROCEDURE — 85018 HEMOGLOBIN: CPT | Performed by: FAMILY MEDICINE

## 2022-12-09 PROCEDURE — 82043 UR ALBUMIN QUANTITATIVE: CPT | Performed by: FAMILY MEDICINE

## 2022-12-09 PROCEDURE — 80053 COMPREHEN METABOLIC PANEL: CPT | Performed by: FAMILY MEDICINE

## 2022-12-09 PROCEDURE — 99214 OFFICE O/P EST MOD 30 MIN: CPT | Mod: 25 | Performed by: FAMILY MEDICINE

## 2022-12-09 PROCEDURE — 84443 ASSAY THYROID STIM HORMONE: CPT | Performed by: FAMILY MEDICINE

## 2022-12-09 PROCEDURE — 0134A COVID-19 VACCINE BIVALENT BOOSTER 18+ (MODERNA): CPT | Performed by: FAMILY MEDICINE

## 2022-12-09 PROCEDURE — 36415 COLL VENOUS BLD VENIPUNCTURE: CPT | Performed by: FAMILY MEDICINE

## 2022-12-09 PROCEDURE — 83036 HEMOGLOBIN GLYCOSYLATED A1C: CPT | Performed by: FAMILY MEDICINE

## 2022-12-09 PROCEDURE — G0008 ADMIN INFLUENZA VIRUS VAC: HCPCS | Performed by: FAMILY MEDICINE

## 2022-12-09 PROCEDURE — 90662 IIV NO PRSV INCREASED AG IM: CPT | Performed by: FAMILY MEDICINE

## 2022-12-09 PROCEDURE — 91313 COVID-19 VACCINE BIVALENT BOOSTER 18+ (MODERNA): CPT | Performed by: FAMILY MEDICINE

## 2022-12-09 PROCEDURE — 80061 LIPID PANEL: CPT | Performed by: FAMILY MEDICINE

## 2022-12-09 RX ORDER — METOPROLOL SUCCINATE 50 MG/1
TABLET, EXTENDED RELEASE ORAL
Qty: 135 TABLET | Refills: 3 | Status: SHIPPED | OUTPATIENT
Start: 2022-12-09 | End: 2023-11-09

## 2022-12-09 RX ORDER — LATANOPROST 50 UG/ML
SOLUTION/ DROPS OPHTHALMIC
COMMUNITY
Start: 2022-11-18

## 2022-12-09 RX ORDER — TRAMADOL HYDROCHLORIDE 50 MG/1
TABLET ORAL
Qty: 180 TABLET | Refills: 0 | Status: CANCELLED | OUTPATIENT
Start: 2022-12-09

## 2022-12-09 RX ORDER — ACETAZOLAMIDE 250 MG/1
250 TABLET ORAL 2 TIMES DAILY
COMMUNITY
Start: 2022-11-18 | End: 2023-08-15

## 2022-12-09 RX ORDER — BRIMONIDINE TARTRATE 1.5 MG/ML
SOLUTION/ DROPS OPHTHALMIC
COMMUNITY
Start: 2022-10-31

## 2022-12-09 RX ORDER — ROSUVASTATIN CALCIUM 40 MG/1
40 TABLET, COATED ORAL DAILY
Qty: 90 TABLET | Refills: 3 | Status: SHIPPED | OUTPATIENT
Start: 2022-12-09 | End: 2024-01-31

## 2022-12-09 RX ORDER — LANCETS 33 GAUGE
EACH MISCELLANEOUS
Qty: 100 EACH | Refills: 0 | Status: CANCELLED | OUTPATIENT
Start: 2022-12-09

## 2022-12-09 RX ORDER — BLOOD SUGAR DIAGNOSTIC
STRIP MISCELLANEOUS
Qty: 100 STRIP | Refills: 3 | Status: SHIPPED | OUTPATIENT
Start: 2022-12-09 | End: 2024-01-31

## 2022-12-09 RX ORDER — DORZOLAMIDE HCL 20 MG/ML
SOLUTION/ DROPS OPHTHALMIC
COMMUNITY
Start: 2022-12-07

## 2022-12-09 RX ORDER — TIMOLOL MALEATE 5 MG/ML
SOLUTION/ DROPS OPHTHALMIC
COMMUNITY
Start: 2022-12-07

## 2022-12-09 RX ORDER — OMEPRAZOLE 40 MG/1
CAPSULE, DELAYED RELEASE ORAL
Qty: 90 CAPSULE | Refills: 1 | Status: SHIPPED | OUTPATIENT
Start: 2022-12-09 | End: 2024-01-31

## 2022-12-09 RX ORDER — LISINOPRIL 20 MG/1
20 TABLET ORAL DAILY
Qty: 90 TABLET | Refills: 0 | Status: SHIPPED | OUTPATIENT
Start: 2022-12-09 | End: 2023-05-19

## 2022-12-09 RX ORDER — GLIPIZIDE 10 MG/1
10 TABLET, FILM COATED, EXTENDED RELEASE ORAL DAILY
Qty: 90 TABLET | Refills: 0 | Status: SHIPPED | OUTPATIENT
Start: 2022-12-09 | End: 2023-05-19

## 2022-12-09 RX ORDER — FUROSEMIDE 40 MG
40 TABLET ORAL DAILY
Qty: 90 TABLET | Refills: 3 | Status: SHIPPED | OUTPATIENT
Start: 2022-12-09 | End: 2024-04-17

## 2022-12-09 ASSESSMENT — PAIN SCALES - GENERAL: PAINLEVEL: NO PAIN (0)

## 2022-12-09 NOTE — PROGRESS NOTES
Assessment & Plan     Type 2 diabetes mellitus with hyperglycemia, without long-term current use of insulin (H), improving. A1c  Today 8.2  - Refill: glipiZIDE (GLUCOTROL XL) 10 MG 24 hr tablet  Dispense: 90 tablet; Refill: 0  - blood glucose (ONETOUCH ULTRA) test strip  Dispense: 100 strip; Refill: 3  - Continue Therapeutic lifestyle changes.     Type 2 diabetes mellitus with diabetic nephropathy, without long-term current use of insulin (H)  - HEMOGLOBIN A1C    Hypertension goal BP (blood pressure) < 140/90, controlled.   - Refill: furosemide (LASIX) 40 MG tablet  Dispense: 90 tablet; Refill: 3  - Refill: lisinopril (ZESTRIL) 20 MG tablet  Dispense: 90 tablet; Refill: 0  - Refill: metoprolol succinate ER (TOPROL XL) 50 MG 24 hr tablet  Dispense: 135 tablet; Refill: 3    Hyperlipidemia LDL goal <100  - Refill: rosuvastatin (CRESTOR) 40 MG tablet  Dispense: 90 tablet; Refill: 3    CKD (chronic kidney disease) stage 4, GFR 15-29 ml/min (H)  - Albumin Random Urine Quantitative with Creat Ratio  - COMPREHENSIVE METABOLIC PANEL  - Hemoglobin      Coronary artery disease involving autologous artery coronary bypass graft without angina pectoris  - Lipid panel reflex to direct LDL Non-fasting    Gastroesophageal reflux disease without esophagitis  - Refill: omeprazole (PRILOSEC) 40 MG DR capsule  Dispense: 90 capsule; Refill: 1    Postmenopausal estrogen deficiency  - DEXA HIP/PELVIS/SPINE - Future    Need for vaccination  - COVID-19,PF,MODERNA BIVALENT (18+YRS)  - INFLUENZA, QUAD, HIGH DOSE, PF, 65YR + (FLUZONE HD)        Return in about 4 months (around 4/9/2023) for Diabetes Follow Up with a HgbA1C prior to visit.    Margaret Chandra MD  [facility] GUILLERMO Teague is a 77 year old, presenting for the following health issues:  Lipids, Diabetes, and Hypertension      HPI     Diabetes Follow-up  Glipizide 10 mg/day- tolerating well. No side effects.   How often are you checking your blood  sugar? One time daily  What time of day are you checking your blood sugars (select all that apply)?  Before meals  Have you had any blood sugars above 200?  Yes   Have you had any blood sugars below 70?  No    What symptoms do you notice when your blood sugar is low?  None    What concerns do you have today about your diabetes? None     Do you have any of these symptoms? (Select all that apply)  No numbness or tingling in feet.  No redness, sores or blisters on feet.  No complaints of excessive thirst.  No reports of blurry vision.  No significant changes to weight.  Patient declines Foot Exam.     Hyperlipidemia Follow-Up      Are you regularly taking any medication or supplement to lower your cholesterol?   Yes- rosuvastatin    Are you having muscle aches or other side effects that you think could be caused by your cholesterol lowering medication?  No   Last lipid panel-  Recent Labs   Lab Test 12/03/21  1046 07/22/20  1056 08/02/16  0846 06/23/15  1049   CHOL 117 126   < > 139   HDL 48* 43*   < > 51   LDL 45 56   < > 67   TRIG 120 137   < > 105   CHOLHDLRATIO  --   --   --  2.7    < > = values in this interval not displayed.         Hypertension Follow-up      Do you check your blood pressure regularly outside of the clinic? Yes     Are you following a low salt diet? Yes    Are your blood pressures ever more than 140 on the top number (systolic) OR more   than 90 on the bottom number (diastolic), for example 140/90? No    BP Readings from Last 2 Encounters:   12/09/22 134/64   05/09/22 124/68     Hemoglobin A1C (%)   Date Value   12/09/2022 8.2 (H)   05/04/2022 9.0 (H)   11/13/2020 7.4 (H)   07/22/2020 7.4 (H)     LDL Cholesterol Calculated (mg/dL)   Date Value   12/03/2021 45   07/22/2020 56   08/13/2018 61       CAD - Patient has a longstanding history of moderate-severe CAD s/p bypass surgery in 2009. Patient denies recent chest pain or NTG use, denies exercise induced dyspnea or PND.     GERD- controlled on  PPI as needed.     CKD, stage III- last renal labs revealed,   Component      Latest Ref Rng & Units 5/4/2022   Creatinine      0.52 - 1.04 mg/dL 1.68 (H)     Component      Latest Ref Rng & Units 5/4/2022   GFR Estimate      >60 mL/min/1.73m2 31 (L)       HEALTH CARE MAINTENANCE: due for DEXA      Review of Systems   Constitutional, HEENT, cardiovascular, pulmonary, gi and gu systems are negative, except as otherwise noted.      Objective    /64   Pulse 93   Temp 97.6  F (36.4  C) (Tympanic)   Resp 18   Wt 85.7 kg (189 lb)   SpO2 100%   BMI 28.32 kg/m    Body mass index is 28.32 kg/m .  Physical Exam   GENERAL: healthy, alert and no distress  RESP: lungs clear to auscultation - no rales, rhonchi or wheezes  CV: regular rate and rhythm, normal S1 S2, no S3 or S4, no murmur, click or rub, no peripheral edema and peripheral pulses strong  Diabetic foot exam: Declined exam today.     DATA  Reviewed and discussed with patient prior to discharge.  Results for orders placed or performed in visit on 12/09/22   HEMOGLOBIN A1C     Status: Abnormal   Result Value Ref Range    Hemoglobin A1C 8.2 (H) 0.0 - 5.6 %   Hemoglobin     Status: Normal   Result Value Ref Range    Hemoglobin 12.6 11.7 - 15.7 g/dL

## 2022-12-09 NOTE — LETTER
December 16, 2022      Zahida Parra  [address] [place] [address]        Dear ,    We are writing to inform you of your test results.    Your recent labs showed,      A1c was 8.2 like we discussed in the office. This improved compared to 7 months ago. Well done!     Urine microalbumin test showed evidence of a blood protein (albumin) in your urine, a complication of diabetes known as microalbuminuria.  It's important that we keep your blood pressure and diabetes under tight control to prevent further kidney damage. Will repeat this urine test in a year.     Cholesterol panel looks good. HDL (good cholesterol) was a little low. You can bridge any nutritional gaps by taking an OTC Fish oil supplement/day.      Thyroid function test was normal.      Complete Metabolic Panel (panel that checks liver function, kidney function and electrolytes) was normal except that your urea nitrogen was elevated which indicates that you were slightly dehydrated that day and kidney function was slightly depressed from baseline. I recommend that you continue efforts to keep well hydrated daily.   Continue to follow up with your Nephrologist.   Resulted Orders   HEMOGLOBIN A1C   Result Value Ref Range    Hemoglobin A1C 8.2 (H) 0.0 - 5.6 %      Comment:      Normal <5.7%   Prediabetes 5.7-6.4%    Diabetes 6.5% or higher     Note: Adopted from ADA consensus guidelines.   Albumin Random Urine Quantitative with Creat Ratio   Result Value Ref Range    Creatinine Urine mg/dL 30 mg/dL    Albumin Urine mg/L 47 mg/L    Albumin Urine mg/g Cr 156.67 (H) 0.00 - 25.00 mg/g Cr   COMPREHENSIVE METABOLIC PANEL   Result Value Ref Range    Sodium 138 133 - 144 mmol/L    Potassium 4.2 3.4 - 5.3 mmol/L    Chloride 106 94 - 109 mmol/L    Carbon Dioxide (CO2) 22 20 - 32 mmol/L    Anion Gap 10 3 - 14 mmol/L    Urea Nitrogen 44 (H) 7 - 30 mg/dL    Creatinine 1.76 (H) 0.52 - 1.04 mg/dL    Calcium 9.3 8.5 - 10.1 mg/dL    Glucose 149 (H) 70 -  99 mg/dL    Alkaline Phosphatase 87 40 - 150 U/L    AST 17 0 - 45 U/L    ALT 34 0 - 50 U/L    Protein Total 7.6 6.8 - 8.8 g/dL    Albumin 3.6 3.4 - 5.0 g/dL    Bilirubin Total 0.4 0.2 - 1.3 mg/dL    GFR Estimate 29 (L) >60 mL/min/1.73m2      Comment:      Effective December 21, 2021 eGFRcr in adults is calculated using the 2021 CKD-EPI creatinine equation which includes age and gender (Marely et al., NEJ, DOI: 10.1056/LKUEgm1626871)   Lipid panel reflex to direct LDL Non-fasting   Result Value Ref Range    Cholesterol 109 <200 mg/dL    Triglycerides 127 <150 mg/dL    Direct Measure HDL 45 (L) >=50 mg/dL    LDL Cholesterol Calculated 39 <=100 mg/dL    Non HDL Cholesterol 64 <130 mg/dL    Patient Fasting > 8hrs? Yes     Narrative    Cholesterol  Desirable:  <200 mg/dL    Triglycerides  Normal:  Less than 150 mg/dL  Borderline High:  150-199 mg/dL  High:  200-499 mg/dL  Very High:  Greater than or equal to 500 mg/dL    Direct Measure HDL  Female:  Greater than or equal to 50 mg/dL   Male:  Greater than or equal to 40 mg/dL    LDL Cholesterol  Desirable:  <100mg/dL  Above Desirable:  100-129 mg/dL   Borderline High:  130-159 mg/dL   High:  160-189 mg/dL   Very High:  >= 190 mg/dL    Non HDL Cholesterol  Desirable:  130 mg/dL  Above Desirable:  130-159 mg/dL  Borderline High:  160-189 mg/dL  High:  190-219 mg/dL  Very High:  Greater than or equal to 220 mg/dL   Hemoglobin   Result Value Ref Range    Hemoglobin 12.6 11.7 - 15.7 g/dL   TSH WITH FREE T4 REFLEX   Result Value Ref Range    TSH 0.81 0.40 - 4.00 mU/L       If you have any questions or concerns, please call the clinic at the number listed above.       Sincerely,    Margaret Chandra MD/kriso

## 2023-01-02 DIAGNOSIS — G89.4 CHRONIC PAIN SYNDROME: ICD-10-CM

## 2023-01-04 RX ORDER — TRAMADOL HYDROCHLORIDE 50 MG/1
TABLET ORAL
Qty: 180 TABLET | Refills: 0 | Status: SHIPPED | OUTPATIENT
Start: 2023-01-04 | End: 2023-02-01

## 2023-02-01 DIAGNOSIS — G89.4 CHRONIC PAIN SYNDROME: ICD-10-CM

## 2023-02-01 RX ORDER — TRAMADOL HYDROCHLORIDE 50 MG/1
TABLET ORAL
Qty: 180 TABLET | Refills: 0 | Status: SHIPPED | OUTPATIENT
Start: 2023-02-01 | End: 2023-03-01

## 2023-03-16 ENCOUNTER — TELEPHONE (OUTPATIENT)
Dept: FAMILY MEDICINE | Facility: CLINIC | Age: 78
End: 2023-03-16

## 2023-03-16 NOTE — TELEPHONE ENCOUNTER
Called patient in regards to annual wellness visit-- patient says she will schedule when she comes in for her appointment on 4/14 with PCP.     Lindsey PADILLA,   [facility]

## 2023-03-29 DIAGNOSIS — G89.4 CHRONIC PAIN SYNDROME: ICD-10-CM

## 2023-03-29 RX ORDER — TRAMADOL HYDROCHLORIDE 50 MG/1
TABLET ORAL
Qty: 180 TABLET | Refills: 0 | Status: SHIPPED | OUTPATIENT
Start: 2023-03-29 | End: 2023-05-02

## 2023-04-01 ENCOUNTER — TRANSFERRED RECORDS (OUTPATIENT)
Dept: MULTI SPECIALTY CLINIC | Facility: CLINIC | Age: 78
End: 2023-04-01

## 2023-04-01 LAB — RETINOPATHY: NORMAL

## 2023-04-26 ENCOUNTER — TELEPHONE (OUTPATIENT)
Dept: PHARMACY | Facility: OTHER | Age: 78
End: 2023-04-26
Payer: COMMERCIAL

## 2023-04-26 NOTE — TELEPHONE ENCOUNTER
I called patient to offer an MTM visit as referred by his [facility] insurance plan. I spoke with patient and she's not interested at this time. Please let us know if we may assist in the future.    Elisa Allen, PharmD, [facility]  Medication Therapy Management Pharmacist  Pager: [phone]

## 2023-05-19 ENCOUNTER — OFFICE VISIT (OUTPATIENT)
Dept: FAMILY MEDICINE | Facility: CLINIC | Age: 78
End: 2023-05-19
Payer: COMMERCIAL

## 2023-05-19 VITALS
HEART RATE: 97 BPM | RESPIRATION RATE: 20 BRPM | SYSTOLIC BLOOD PRESSURE: 130 MMHG | TEMPERATURE: 97.5 F | BODY MASS INDEX: 29.26 KG/M2 | HEIGHT: 67 IN | WEIGHT: 186.4 LBS | DIASTOLIC BLOOD PRESSURE: 56 MMHG | OXYGEN SATURATION: 96 %

## 2023-05-19 DIAGNOSIS — I10 HYPERTENSION GOAL BP (BLOOD PRESSURE) < 140/90: ICD-10-CM

## 2023-05-19 DIAGNOSIS — Z79.891 LONG TERM (CURRENT) USE OF OPIATE ANALGESIC: ICD-10-CM

## 2023-05-19 DIAGNOSIS — N18.4 CKD (CHRONIC KIDNEY DISEASE) STAGE 4, GFR 15-29 ML/MIN (H): ICD-10-CM

## 2023-05-19 DIAGNOSIS — E11.65 TYPE 2 DIABETES MELLITUS WITH HYPERGLYCEMIA, WITHOUT LONG-TERM CURRENT USE OF INSULIN (H): Primary | ICD-10-CM

## 2023-05-19 LAB
AMPHETAMINES UR QL: NOT DETECTED
BARBITURATES UR QL SCN: NOT DETECTED
BENZODIAZ UR QL SCN: NOT DETECTED
BUPRENORPHINE UR QL: NOT DETECTED
CANNABINOIDS UR QL: NOT DETECTED
COCAINE UR QL SCN: NOT DETECTED
CREAT UR-MCNC: 30 MG/DL
D-METHAMPHET UR QL: NOT DETECTED
HBA1C MFR BLD: 7.8 % (ref 0–5.6)
HGB BLD-MCNC: 13.1 G/DL (ref 11.7–15.7)
METHADONE UR QL SCN: NOT DETECTED
MICROALBUMIN UR-MCNC: 69 MG/L
MICROALBUMIN/CREAT UR: 230 MG/G CR (ref 0–25)
OPIATES UR QL SCN: NOT DETECTED
OXYCODONE UR QL SCN: NOT DETECTED
PCP UR QL SCN: NOT DETECTED
PROPOXYPH UR QL: NOT DETECTED
TRICYCLICS UR QL SCN: NOT DETECTED

## 2023-05-19 PROCEDURE — 85018 HEMOGLOBIN: CPT | Performed by: FAMILY MEDICINE

## 2023-05-19 PROCEDURE — 80306 DRUG TEST PRSMV INSTRMNT: CPT | Performed by: FAMILY MEDICINE

## 2023-05-19 PROCEDURE — 82043 UR ALBUMIN QUANTITATIVE: CPT | Performed by: FAMILY MEDICINE

## 2023-05-19 PROCEDURE — 83036 HEMOGLOBIN GLYCOSYLATED A1C: CPT | Performed by: FAMILY MEDICINE

## 2023-05-19 PROCEDURE — 36415 COLL VENOUS BLD VENIPUNCTURE: CPT | Performed by: FAMILY MEDICINE

## 2023-05-19 PROCEDURE — 82570 ASSAY OF URINE CREATININE: CPT | Mod: 59 | Performed by: FAMILY MEDICINE

## 2023-05-19 PROCEDURE — 99214 OFFICE O/P EST MOD 30 MIN: CPT | Performed by: FAMILY MEDICINE

## 2023-05-19 RX ORDER — GLIPIZIDE 10 MG/1
10 TABLET, FILM COATED, EXTENDED RELEASE ORAL DAILY
Qty: 90 TABLET | Refills: 3 | Status: SHIPPED | OUTPATIENT
Start: 2023-05-19 | End: 2024-06-05

## 2023-05-19 RX ORDER — LISINOPRIL 20 MG/1
20 TABLET ORAL DAILY
Qty: 90 TABLET | Refills: 3 | Status: SHIPPED | OUTPATIENT
Start: 2023-05-19 | End: 2024-04-17

## 2023-05-19 ASSESSMENT — PAIN SCALES - GENERAL: PAINLEVEL: NO PAIN (0)

## 2023-05-19 NOTE — PROGRESS NOTES
Assessment & Plan     Type 2 diabetes mellitus with hyperglycemia, without long-term current use of insulin (H), controlled. A1c 7.8 today. Goal A1c < 8.0  - HEMOGLOBIN A1C  - Refill: glipiZIDE (GLUCOTROL XL) 10 MG 24 hr tablet  Dispense: 90 tablet; Refill: 3    CKD (chronic kidney disease) stage 4, GFR 15-29 ml/min (H)  - Albumin Random Urine Quantitative with Creat Ratio  - Hemoglobin  - Comprehensive metabolic panel (BMP + Alb, Alk Phos, ALT, AST, Total. Bili, TP)      Hypertension goal BP (blood pressure) < 140/90, controlled  - Refill:  lisinopril (ZESTRIL) 20 MG tablet  Dispense: 90 tablet; Refill: 3    Long term (current) use of opiate analgesic  - Drug Abuse Screen Panel 13, Urine (Pain Care Package) - lab collect      Return in about 3 months (around 8/19/2023) for Diabetes Follow Up with a HgbA1C prior to visit.      Margaret Chandra MD  [facility] GUILLERMO Teague is a 77 year old, presenting for the following health issues:  Diabetes (recheck) and Hypertension (refills)        5/19/2023     2:24 PM   Additional Questions   Roomed by Lion DUFFY   Accompanied by NANCY         5/19/2023     2:24 PM   Patient Reported Additional Medications   Patient reports taking the following new medications NANCY ROWE     Diabetes Follow-up  Currently on glipizide 10 mg daily.  Tolerating well, no side effects reported.  How often are you checking your blood sugar? One time daily  What time of day are you checking your blood sugars (select all that apply)?  Before meals  Have you had any blood sugars above 200?  No  Have you had any blood sugars below 70?  No    What symptoms do you notice when your blood sugar is low?  None    What concerns do you have today about your diabetes? None     Do you have any of these symptoms? (Select all that apply)  No numbness or tingling in feet.  No redness, sores or blisters on feet.  No complaints of excessive thirst.  No reports of blurry vision.  No  "significant changes to weight.    Have you had a diabetic eye exam in the last 12 months? Yes- Date of last eye exam: 04/2023,  Location: [facility] [place]    BP Readings from Last 2 Encounters:   05/19/23 130/56   12/09/22 134/64     Hemoglobin A1C (%)   Date Value   05/19/2023 7.8 (H)   12/09/2022 8.2 (H)   11/13/2020 7.4 (H)   07/22/2020 7.4 (H)     LDL Cholesterol Calculated (mg/dL)   Date Value   12/09/2022 39   12/03/2021 45   07/22/2020 56   08/13/2018 61           How many servings of fruits and vegetables do you eat daily?  0-1    On average, how many sweetened beverages do you drink each day (Examples: soda, juice, sweet tea, etc.  Do NOT count diet or artificially sweetened beverages)?   3     How many days per week do you exercise enough to make your heart beat faster? 3 or less    How many minutes a day do you exercise enough to make your heart beat faster? 9 or less    How many days per week do you miss taking your medication? 0    Hypertension Follow-up      Do you check your blood pressure regularly outside of the clinic? Yes     Are you following a low salt diet? Yes    Are your blood pressures ever more than 140 on the top number (systolic) OR more   than 90 on the bottom number (diastolic), for example 140/90? No      Chronic kidney disease-  Recent labs-   Component      Latest Ref Rng 12/9/2022  3:19 PM   Creatinine      0.52 - 1.04 mg/dL 1.76 (H)       Component      Latest Ref Rng 12/9/2022  3:19 PM   GFR Estimate      >60 mL/min/1.73m2 29 (L)       HEALTH CARE MAINTENANCE: Declines vaccines.    Review of Systems   Constitutional, HEENT, cardiovascular, pulmonary, gi and gu systems are negative, except as otherwise noted.      Objective    /56   Pulse 97   Temp 97.5  F (36.4  C) (Temporal)   Resp 20   Ht 1.705 m (5' 7.13\")   Wt 84.6 kg (186 lb 6.4 oz)   SpO2 96%   BMI 29.08 kg/m    Body mass index is 29.08 kg/m .  Physical Exam   GENERAL: healthy, alert and no distress  RESP: " lungs clear to auscultation - no rales, rhonchi or wheezes  CV: regular rate and rhythm, normal S1 S2, no S3 or S4, no murmur, click or rub, no peripheral edema and peripheral pulses strong  PSYCH: mentation appears normal, affect normal/bright    DATA  Reviewed and discussed with patient prior to discharge.  Results for orders placed or performed in visit on 05/19/23   Hemoglobin     Status: Normal   Result Value Ref Range    Hemoglobin 13.1 11.7 - 15.7 g/dL   HEMOGLOBIN A1C     Status: Abnormal   Result Value Ref Range    Hemoglobin A1C 7.8 (H) 0.0 - 5.6 %

## 2023-05-19 NOTE — LETTER
May 22, 2023      Zahida Parra  [address] [place] [address]        Dear ,    We are writing to inform you of your test results.    Your recent labs showed-     Urine drug screen was appropriate.   Hemoglobin was normal- no evidence of anemia.   Urine microalbumin test showed evidence of a blood protein (albumin) in your urine, a complication of diabetes known as microalbuminuria.  It's important that we keep your blood pressure and diabetes under tight control to prevent further kidney damage. Will repeat this urine test in a year.     A1c was 7.8 like we discussed in the office.      Continue your current medication.     Resulted Orders   Hemoglobin   Result Value Ref Range    Hemoglobin 13.1 11.7 - 15.7 g/dL   Albumin Random Urine Quantitative with Creat Ratio   Result Value Ref Range    Creatinine Urine mg/dL 30 mg/dL    Albumin Urine mg/L 69 mg/L    Albumin Urine mg/g Cr 230.00 (H) 0.00 - 25.00 mg/g Cr   HEMOGLOBIN A1C   Result Value Ref Range    Hemoglobin A1C 7.8 (H) 0.0 - 5.6 %      Comment:      Normal <5.7%   Prediabetes 5.7-6.4%    Diabetes 6.5% or higher     Note: Adopted from ADA consensus guidelines.   Drug Abuse Screen Panel 13, Urine (Pain Care Package) - lab collect   Result Value Ref Range    Cannabinoids (03-[id number]-9-THC) Not Detected Not Detected, Indeterminate      Comment:      Cutoff for a negative cannabinoid is 50 ng/mL or less.    Phencyclidine Not Detected Not Detected, Indeterminate      Comment:      Cutoff for a negative PCP is 25 ng/mL or less.    Cocaine (Benzoylecgonine) Not Detected Not Detected, Indeterminate      Comment:      Cutoff for a negative cocaine is 150 ng/ml or less.    Methamphetamine (d-Methamphetamine) Not Detected Not Detected, Indeterminate      Comment:      Cutoff for a negative methamphetamine is 500 ng/ml or less.    Opiates (Morphine) Not Detected Not Detected, Indeterminate      Comment:      Cutoff for a negative opiate is 100  ng/ml or less.    Amphetamine (d-Amphetamine) Not Detected Not Detected, Indeterminate      Comment:      Cutoff for a negative amphetamine is 500 ng/mL or less.    Benzodiazepines (Nordiazepam) Not Detected Not Detected, Indeterminate      Comment:      Cutoff for a negative benzodiazepine is 150 ng/ml or less.    Tricyclic Antidepressants (Desipramine) Not Detected Not Detected, Indeterminate      Comment:      Cutoff for a negative tricyclic antidepressant is 300 ng/ml or less.    Methadone Not Detected Not Detected, Indeterminate      Comment:      Cutoff for a negative methadone is 200 ng/ml or less.    Barbiturates (Butalbital) Not Detected Not Detected, Indeterminate      Comment:      Cutoff for a negative barbituate is 200 ng/ml or less.    Oxycodone Not Detected Not Detected, Indeterminate      Comment:      Cutoff for a negative oxycodone is 100 ng/mL or less.    Propoxyphene (Norpropoxyphene) Not Detected Not Detected, Indeterminate      Comment:      Cutoff for a negative propoxyphene is 300 ng/ml or less.    Buprenorphine Not Detected Not Detected, Indeterminate      Comment:      Cutoff for a negative buprenorphine is 10 ng/ml or less.       If you have any questions or concerns, please call the clinic at the number listed above.       Sincerely,      Margaret Chandra MD/kriso

## 2023-05-30 DIAGNOSIS — G89.4 CHRONIC PAIN SYNDROME: ICD-10-CM

## 2023-05-30 RX ORDER — TRAMADOL HYDROCHLORIDE 50 MG/1
TABLET ORAL
Qty: 180 TABLET | Refills: 0 | Status: SHIPPED | OUTPATIENT
Start: 2023-05-30 | End: 2023-06-29

## 2023-06-29 DIAGNOSIS — G89.4 CHRONIC PAIN SYNDROME: ICD-10-CM

## 2023-06-29 RX ORDER — TRAMADOL HYDROCHLORIDE 50 MG/1
TABLET ORAL
Qty: 180 TABLET | Refills: 0 | Status: SHIPPED | OUTPATIENT
Start: 2023-06-29 | End: 2023-08-04

## 2023-08-02 DIAGNOSIS — G89.4 CHRONIC PAIN SYNDROME: ICD-10-CM

## 2023-08-04 RX ORDER — TRAMADOL HYDROCHLORIDE 50 MG/1
TABLET ORAL
Qty: 180 TABLET | Refills: 0 | Status: SHIPPED | OUTPATIENT
Start: 2023-08-04 | End: 2023-09-07

## 2023-08-15 ENCOUNTER — OFFICE VISIT (OUTPATIENT)
Dept: FAMILY MEDICINE | Facility: CLINIC | Age: 78
End: 2023-08-15
Payer: COMMERCIAL

## 2023-08-15 VITALS
OXYGEN SATURATION: 94 % | RESPIRATION RATE: 16 BRPM | HEART RATE: 95 BPM | SYSTOLIC BLOOD PRESSURE: 110 MMHG | WEIGHT: 184.8 LBS | HEIGHT: 67 IN | BODY MASS INDEX: 29 KG/M2 | TEMPERATURE: 97.1 F | DIASTOLIC BLOOD PRESSURE: 56 MMHG

## 2023-08-15 DIAGNOSIS — H26.9 CATARACT OF RIGHT EYE, UNSPECIFIED CATARACT TYPE: ICD-10-CM

## 2023-08-15 DIAGNOSIS — E11.65 TYPE 2 DIABETES MELLITUS WITH HYPERGLYCEMIA, WITHOUT LONG-TERM CURRENT USE OF INSULIN (H): ICD-10-CM

## 2023-08-15 DIAGNOSIS — N18.4 CKD (CHRONIC KIDNEY DISEASE) STAGE 4, GFR 15-29 ML/MIN (H): ICD-10-CM

## 2023-08-15 DIAGNOSIS — Z01.818 PREOP GENERAL PHYSICAL EXAM: Primary | ICD-10-CM

## 2023-08-15 PROCEDURE — 99214 OFFICE O/P EST MOD 30 MIN: CPT | Performed by: FAMILY MEDICINE

## 2023-08-15 ASSESSMENT — PAIN SCALES - GENERAL: PAINLEVEL: NO PAIN (0)

## 2023-08-15 NOTE — PATIENT INSTRUCTIONS
For informational purposes only. Not to replace the advice of your health care provider. Copyright   2003,  [employer] [facility]. All rights reserved. Clinically reviewed by Hanna Courtney MD. Native [id number] - REV .  Preparing for Your Surgery  Getting started  A nurse will call you to review your health history and instructions. They will give you an arrival time based on your scheduled surgery time. Please be ready to share:  Your doctor's clinic name and phone number  Your medical, surgical, and anesthesia history  A list of allergies and sensitivities  A list of medicines, including herbal treatments and over-the-counter drugs  Whether the patient has a legal guardian (ask how to send us the papers in advance)  Please tell us if you're pregnant--or if there's any chance you might be pregnant. Some surgeries may injure a fetus (unborn baby), so they require a pregnancy test. Surgeries that are safe for a fetus don't always need a test, and you can choose whether to have one.   If you have a child who's having surgery, please ask for a copy of Preparing for Your Child's Surgery.    Preparing for surgery  Within 10 to 30 days of surgery: Have a pre-op exam (sometimes called an H&P, or History and Physical). This can be done at a clinic or pre-operative center.  If you're having a , you may not need this exam. Talk to your care team.  At your pre-op exam, talk to your care team about all medicines you take. If you need to stop any medicines before surgery, ask when to start taking them again.  We do this for your safety. Many medicines can make you bleed too much during surgery. Some change how well surgery (anesthesia) drugs work.  Call your insurance company to let them know you're having surgery. (If you don't have insurance, call [phone].)  Call your clinic if there's any change in your health. This includes signs of a cold or flu (sore throat, runny nose, cough, rash, fever). It also  includes a scrape or scratch near the surgery site.  If you have questions on the day of surgery, call your hospital or surgery center.  Eating and drinking guidelines  For your safety: Unless your surgeon tells you otherwise, follow the guidelines below.  Eat and drink as usual until 8 hours before you arrive for surgery. After that, no food or milk.  Drink clear liquids until 2 hours before you arrive. These are liquids you can see through, like water, Gatorade, and Propel Water. They also include plain black coffee and tea (no cream or milk), candy, and breath mints. You can spit out gum when you arrive.  If you drink alcohol: Stop drinking it the night before surgery.  If your care team tells you to take medicine on the morning of surgery, it's okay to take it with a sip of water.  Preventing infection  Shower or bathe the night before and morning of your surgery. Follow the instructions your clinic gave you. (If no instructions, use regular soap.)  Don't shave or clip hair near your surgery site. We'll remove the hair if needed.  Don't smoke or vape the morning of surgery. You may chew nicotine gum up to 2 hours before surgery. A nicotine patch is okay.  Note: Some surgeries require you to completely quit smoking and nicotine. Check with your surgeon.  Your care team will make every effort to keep you safe from infection. We will:  Clean our hands often with soap and water (or an alcohol-based hand rub).  Clean the skin at your surgery site with a special soap that kills germs.  Give you a special gown to keep you warm. (Cold raises the risk of infection.)  Wear special hair covers, masks, gowns and gloves during surgery.  Give antibiotic medicine, if prescribed. Not all surgeries need antibiotics.  What to bring on the day of surgery  Photo ID and insurance card  Copy of your health care directive, if you have one  Glasses and hearing aids (bring cases)  You can't wear contacts during surgery  Inhaler and eye  drops, if you use them (tell us about these when you arrive)  CPAP machine or breathing device, if you use them  A few personal items, if spending the night  If you have . . .  A pacemaker, ICD (cardiac defibrillator) or other implant: Bring the ID card.  An implanted stimulator: Bring the remote control.  A legal guardian: Bring a copy of the certified (court-stamped) guardianship papers.  Please remove any jewelry, including body piercings. Leave jewelry and other valuables at home.  If you're going home the day of surgery  You must have a responsible adult drive you home. They should stay with you overnight as well.  If you don't have someone to stay with you, and you aren't safe to go home alone, we may keep you overnight. Insurance often won't pay for this.  After surgery  If it's hard to control your pain or you need more pain medicine, please call your surgeon's office.  Questions?   If you have any questions for your care team, list them here: _________________________________________________________________________________________________________________________________________________________________________ ____________________________________ ____________________________________ ____________________________________    How to Take Your Medication Before Surgery  - HOLD (do not take) Aspirin for 7 days  - HOLD (do not take) Furosemide, Lisinopril the morning of surgery

## 2023-08-15 NOTE — PROGRESS NOTES
[facility] [place]  [address] [facility]  [place] [address]  Phone: [phone]  Primary Provider: Margaret Hunt  Pre-op Performing Provider: MARGARET HUNT    PREOPERATIVE EVALUATION:  Today's date: 8/15/2023    Anamaria Parra is a 78 year old female who presents for a preoperative evaluation.      8/15/2023     9:04 AM   Additional Questions   Roomed by Lion DUFFY   Accompanied by NA         8/15/2023     9:04 AM   Patient Reported Additional Medications   Patient reports taking the following new medications NA       Surgical Information:  Surgery/Procedure: R eye Cataract Surgery  Surgery Location: [facility]   Surgeon: Dr Mago Cox  Surgery Date: 08/30/2023  Time of Surgery: TBD  Where patient plans to recover: At home with family  Fax number for surgical facility: [phone]    Assessment & Plan     The proposed surgical procedure is considered LOW risk.    Preop general physical exam      Cataract of right eye, unspecified cataract type      Type 2 diabetes mellitus with hyperglycemia, without long-term current use of insulin (H)  Stable. Recent A1c 7.8    CKD (chronic kidney disease) stage 4, GFR 15-29 ml/min (H)  - Stable.          - No identified additional risk factors other than previously addressed    Antiplatelet or Anticoagulation Medication Instructions:   - aspirin: Discontinue aspirin 7-10 days prior to procedure to reduce bleeding risk. It should be resumed postoperatively.     Additional Medication Instructions:  Patient is to take all scheduled medications on the day of surgery EXCEPT for modifications listed below:   - ACE/ARB: HOLD on day of surgery (minimum 11 hours for general anesthesia).   - Diuretics: HOLD on the day of surgery.    RECOMMENDATION:  APPROVAL GIVEN to proceed with proposed procedure, without further diagnostic evaluation.    Subjective       HPI related to upcoming procedure: R eye Cataract Surgery    78-year-old pleasant female  patient of mine here for a preop exam.  She has a known history of cataracts-due for right cataract extraction.  States that she understands the risks and benefits of the procedure and wishes to proceed.  Denies having any new concerns today.  No recent illnesses.        8/15/2023     9:08 AM   Preop Questions   1. Have you ever had a heart attack or stroke? YES    2. Have you ever had surgery on your heart or blood vessels, such as a stent placement, a coronary artery bypass, or surgery on an artery in your head, neck, heart, or legs? YES    3. Do you have chest pain with activity? No   4. Do you have a history of  heart failure? No   5. Do you currently have a cold, bronchitis or symptoms of other infection? No   6. Do you have a cough, shortness of breath, or wheezing? No   7. Do you or anyone in your family have previous history of blood clots? No   8. Do you or does anyone in your family have a serious bleeding problem such as prolonged bleeding following surgeries or cuts? No   9. Have you ever had problems with anemia or been told to take iron pills? No   10. Have you had any abnormal blood loss such as black, tarry or bloody stools, or abnormal vaginal bleeding? No   11. Have you ever had a blood transfusion? No   12. Are you willing to have a blood transfusion if it is medically needed before, during, or after your surgery? Yes   13. Have you or any of your relatives ever had problems with anesthesia? No   14. Do you have sleep apnea, excessive snoring or daytime drowsiness? No   15. Do you have any artifical heart valves or other implanted medical devices like a pacemaker, defibrillator, or continuous glucose monitor? No   16. Do you have artificial joints? YES    17. Are you allergic to latex? No     Health Care Directive:  Patient has a Health Care Directive on file      Preoperative Review of :   reviewed - controlled substances reflected in medication list.      Status of Chronic Conditions:  See  problem list for active medical problems.  Problems all longstanding and stable, except as noted/documented.  See ROS for pertinent symptoms related to these conditions.    Review of Systems  CONSTITUTIONAL: NEGATIVE for fever, chills, change in weight  INTEGUMENTARY/SKIN: NEGATIVE for worrisome rashes, moles or lesions  EYES: NEGATIVE for vision changes or irritation  ENT/MOUTH: NEGATIVE for ear, mouth and throat problems  RESP: NEGATIVE for significant cough or SOB  CV: NEGATIVE for chest pain, palpitations or peripheral edema  GI: NEGATIVE for nausea, abdominal pain, heartburn, or change in bowel habits  : NEGATIVE for frequency, dysuria, or hematuria  MUSCULOSKELETAL: NEGATIVE for significant arthralgias or myalgia  NEURO: NEGATIVE for weakness, dizziness or paresthesias  ENDOCRINE: NEGATIVE for temperature intolerance, skin/hair changes  HEME: NEGATIVE for bleeding problems  PSYCHIATRIC: NEGATIVE for changes in mood or affect    Patient Active Problem List    Diagnosis Date Noted    CKD (chronic kidney disease) stage 3, GFR 30-59 ml/min (H) 08/02/2010     Priority: High    Alcohol dependence in remission (H) 05/04/2022     Priority: Medium    CKD (chronic kidney disease) stage 4, GFR 15-29 ml/min (H) 01/12/2022     Priority: Medium    Gastroesophageal reflux disease without esophagitis 01/04/2018     Priority: Medium    S/P coronary artery stent placement 01/04/2018     Priority: Medium    Type 2 diabetes mellitus with hyperglycemia, without long-term current use of insulin (H) 12/20/2016     Priority: Medium    Hypertension goal BP (blood pressure) < 140/90 08/02/2016     Priority: Medium    Coronary artery disease involving autologous artery coronary bypass graft without angina pectoris 08/02/2016     Priority: Medium    Chronic pain syndrome 01/27/2016     Priority: Medium     Patient is followed by Data Unavailable for ongoing prescription of pain medication.  All refills should be approved by this  provider, or covering partner.    Medication(s): tramadol 50 mg .   Maximum quantity per month: 180  Clinic visit frequency required: Q 3 months     Controlled substance agreement on file: Yes       Date(s): 2010    Pain Clinic evaluation in the past: No    DIRE Total Score(s):  No flowsheet data found.    Last [facility] website verification:  none; 09/08/2016    [email or web address]        Morbid obesity due to excess calories (H) 11/06/2015     Priority: Medium    ACP (advance care planning) 10/23/2015     Priority: Medium     Advance Care Planning 10/23/2015: ACP Review and Resources Provided:  Reviewed chart for advance care plan.  Anamaria Parra has no plan or code status on file. Discussed available resources and provided with information. Confirmed code status reflects current choices pending further ACP discussions.  Confirmed/documented legally designated decision maker(s). Added by DAYNA Payne  Advance Directive Problem List Overview:   Name Relationship Phone    Primary Health Care Agent            Alternative Health Care Agent          Discussed advance care planning with patient; however, patient declined at this time. 3/1/2012         Ovarian cyst 06/01/2014     Priority: Medium     right      Partial tear of rotator cuff 03/08/2013     Priority: Medium     Problem list name updated by automated process. Provider to review      AC (acromioclavicular) joint arthritis - right 03/08/2013     Priority: Medium    Shoulder impingement - right 03/08/2013     Priority: Medium    Osteoarthritis of shoulder - right 03/08/2013     Priority: Medium    Hyperlipidemia LDL goal <100 03/16/2011     Priority: Medium    Peripheral neuropathy 03/16/2011     Priority: Medium    Hyperthyroidism 08/02/2010     Priority: Medium    Onychomycosis due to dermatophyte      Priority: Medium     (Problem list name updated by automated process. Provider to review and confirm.)      Psoriasis      Priority: Medium     PAD (peripheral artery disease) (H)      Priority: Medium    Carotid stenosis      Priority: Medium     mild      Spasmodic torticollis      Priority: Medium      Past Medical History:   Diagnosis Date    ASHD (arteriosclerotic heart disease)     Carotid stenosis     mild    Diabetes mellitus     ETOHism (H)     h/o sober 05/13/97    Menopause 98    Onychomycoses     Ovarian cyst 6/2014    right    Overweight, obesity and other hyperalimentation     PAD (peripheral artery disease) (H)     Psoriasis     Rotator cuff tear, right 2/11/2012    Smoker     quit 88    Spasmodic torticollis     Unspecified essential hypertension     Vitamin D deficiencies      Past Surgical History:   Procedure Laterality Date    ANGIOGRAM  02    with 2 stents    ANGIOGRAM  04    ANGIOGRAM  12/2017, 2 stents in RCA    at [facility]     CLOSED RX CLAVICLE FRACTURE  08/07    CORONARY ARTERY BYPASS  01/14/09    TONSILLECTOMY & ADENOIDECTOMY      [facility] ANESTH,SURGERY OF SHOULDER  02/26/07    left shoulder arthroplasty    [facility] HAND/FINGER SURGERY UNLISTED      right thumb deep lac repair     Current Outpatient Medications   Medication Sig Dispense Refill    aspirin 81 MG tablet Take 81 mg by mouth daily      B-12 OR 1000 MCG DAILY      blood glucose (NO BRAND SPECIFIED) lancets standard Use to test blood sugar 1-2 times daily or as directed. 100 each 3    blood glucose (NO BRAND SPECIFIED) lancets standard Use to test blood sugar 3 times daily or as directed. 100 each 3    blood glucose (ONETOUCH ULTRA) test strip TWICE DAILY TESTING OR AS DIRECTED 100 strip 3    blood glucose monitoring (NO BRAND SPECIFIED) meter device kit Use to test blood sugar 2 times daily or as directed. 1 kit 0    brimonidine (ALPHAGAN-P) 0.15 % ophthalmic solution INSTILL 1 DROP IN LEFT EYE TWICE DAILY      dorzolamide (TRUSOPT) 2 % ophthalmic solution       FISH OIL 1000 MG OR CAPS 2 TABLETS DAILY      FOLIC ACID OR 3000 MCG DAILY      furosemide (LASIX) 40 MG tablet Take 1  tablet (40 mg) by mouth daily 90 tablet 3    glipiZIDE (GLUCOTROL XL) 10 MG 24 hr tablet Take 1 tablet (10 mg) by mouth daily 90 tablet 3    latanoprost (XALATAN) 0.005 % ophthalmic solution INSTILL 1 DROP IN LEFT EYE EVERY EVENING      lisinopril (ZESTRIL) 20 MG tablet Take 1 tablet (20 mg) by mouth daily 90 tablet 3    metoprolol succinate ER (TOPROL XL) 50 MG 24 hr tablet TAKE 1 AND 1/2 TABLETS(75 MG) BY MOUTH DAILY Strength: 50 mg 135 tablet 3    MULTI-VITAMIN OR TABS 1 TABLET DAILY      nitroGLYcerin (NITROSTAT) 0.4 MG sublingual tablet Place 1 tablet (0.4 mg) under the tongue every 5 minutes as needed for chest pain up to 3 tablets per episode. 60 tablet 1    omeprazole (PRILOSEC) 40 MG DR capsule TAKE ONE CAPSULE BY MOUTH EVERY OTHER DAY 90 capsule 1    rosuvastatin (CRESTOR) 40 MG tablet Take 1 tablet (40 mg) by mouth daily 90 tablet 3    STOOL SOFTENER OR None Entered      timolol maleate (TIMOPTIC) 0.5 % ophthalmic solution       traMADol (ULTRAM) 50 MG tablet TAKE 2 TABLETS(100 MG) BY MOUTH EVERY 8 HOURS AS NEEDED FOR SEVERE PAIN. MAX OF 6 TABLETS DAILY 180 tablet 0    VITAMIN D 1000 UNIT OR CAPS 1 CAPSULE DAILY      Wheat Dextrin (BENEFIBER PO)          Allergies   Allergen Reactions    Dilantin [Phenytoin]      rash        Social History     Tobacco Use    Smoking status: Former     Years: 30.00     Types: Cigarettes     Start date: 1960     Quit date: 1990     Years since quittin.6     Passive exposure: Never    Smokeless tobacco: Never   Substance Use Topics    Alcohol use: No     Alcohol/week: 0.0 standard drinks of alcohol     Comment: history of abuse      Family History   Problem Relation Age of Onset    Hypertension Mother     Cerebrovascular Disease Mother     Cardiovascular Father     C.A.D. Brother     Diabetes Brother     Hypertension Brother     Cardiovascular Brother      History   Drug Use No         Objective     /56   Pulse 95   Temp 97.1  F (36.2  C) (Temporal)    "Resp 16   Ht 1.705 m (5' 7.13\")   Wt 83.8 kg (184 lb 12.8 oz)   SpO2 94%   BMI 28.84 kg/m      Physical Exam    GENERAL APPEARANCE: healthy, alert and no distress     EYES: EOMI, PERRL     HENT: ear canals and TM's normal and nose and mouth without ulcers or lesions     NECK: no adenopathy, no asymmetry, masses, or scars and thyroid normal to palpation     RESP: lungs clear to auscultation - no rales, rhonchi or wheezes     CV: regular rates and rhythm, normal S1 S2, no S3 or S4 and no murmur, click or rub     ABDOMEN:  soft, nontender, no HSM or masses and bowel sounds normal     MS: extremities normal- no gross deformities noted, no evidence of inflammation in joints, FROM in all extremities.     SKIN: no suspicious lesions or rashes     NEURO: Normal strength and tone, sensory exam grossly normal, mentation intact and speech normal     PSYCH: mentation appears normal. and affect normal/bright     LYMPHATICS: No cervical adenopathy    Recent Labs   Lab Test 05/19/23  1413 12/09/22  1519 05/04/22  0924 05/04/22  0923 05/04/22  0922 01/12/22  1515   HGB 13.1 12.6  --   --  12.4 12.9   PLT  --   --   --   --  201 172   NA  --  138 135  --   --  138   POTASSIUM  --  4.2 5.0  --   --  4.2   CR  --  1.76* 1.68*  --   --  1.41*   A1C 7.8* 8.2*  --    < >  --   --     < > = values in this interval not displayed.        Diagnostics:  No labs were ordered during this visit.   No EKG required for low risk surgery (cataract, skin procedure, breast biopsy, etc).    Revised Cardiac Risk Index (RCRI):  The patient has the following serious cardiovascular risks for perioperative complications:   - No serious cardiac risks = 0 points     RCRI Interpretation: 0 points: Class I (very low risk - 0.4% complication rate)         Signed Electronically by: Margaret Chandra MD  Copy of this evaluation report is provided to requesting physician.      "

## 2023-08-31 ENCOUNTER — TELEPHONE (OUTPATIENT)
Dept: FAMILY MEDICINE | Facility: CLINIC | Age: 78
End: 2023-08-31
Payer: COMMERCIAL

## 2023-08-31 NOTE — TELEPHONE ENCOUNTER
Surgical Information:  Surgery/Procedure: R eye Cataract Surgery  Surgery Location: [facility]   Surgeon: Dr Abraham Cox  Surgery Date: 08/30/2023    Patient had her 1 day follow up with Dr. Cox today.   He anticipates that Zahida will not regain her sight in her right eye for 10-15  more days.   She has 2 eye drops that are placed 4 times/day & 1 eye drop that is  ordered 6 times per day.     Patient lives alone, however her son is with her today.   She reports very limited vision in her Left eye.     Zahida states that Dr. Cox is willing to write a referral for her to go to a Rehab facility until her vision improves in her right eye.    Patient called to get approval  from Dr. Chandra for her to be placed in a Temporary Rehab facility (given her other health issues).     Please review and advise.     Fabiana Ignacio RN BSN  [facility]

## 2023-09-01 NOTE — TELEPHONE ENCOUNTER
I called and spoke to patient, advised her provider is fine with her going to a temporary rehab (TCU).    She says her son is dealing with this, she will let him know and will follow up with eye doctor for this.   She wonders if we have a facility we recommend.   I advised we do not, advised her to call her insurance to see what facilities are covered.    Patient verbalized understanding of and agreement with plan.    Hilda CHAHAL RN  [facility] T[facility]

## 2023-09-01 NOTE — TELEPHONE ENCOUNTER
Huddled with pcp, advised by pcp to call pt to let her know that pcp has approved pt to go to Temporary Rehab facility. Pcp attempted to communicate care for TCU for pt. It has been advised by pcp that pt's surgeon (Dr. Cox) needs to place referral for her to go to a Rehab facility and pcp will take over pt's care once referral has been placed by Dr. Cox.     Called pt to relay above message. Pt verbalized good understanding and will have her son continue the communication for her to be placed at TCU.     Micaela Hill RN on 9/1/2023 at 2:19 PM

## 2023-09-01 NOTE — TELEPHONE ENCOUNTER
Patient's son Ministerio states that his mother has been approved for a spot in Transitional Care at the [facility].      Patient's insurance has approved to cover the first 20 days. They anticipate Zahida being there 1-2 weeks.     They can admit her today or tomorrow. However, they need an order faxed to them at:   [phone].     Ministerio requests a call back at [phone] once the referral has been faxed.     Fabiana Ignacio RN BSN  [facility]

## 2023-09-04 DIAGNOSIS — G89.4 CHRONIC PAIN SYNDROME: ICD-10-CM

## 2023-09-05 DIAGNOSIS — G89.4 CHRONIC PAIN SYNDROME: ICD-10-CM

## 2023-09-07 RX ORDER — TRAMADOL HYDROCHLORIDE 50 MG/1
TABLET ORAL
Qty: 180 TABLET | Refills: 0 | Status: SHIPPED | OUTPATIENT
Start: 2023-09-07 | End: 2023-10-08

## 2023-09-07 RX ORDER — TRAMADOL HYDROCHLORIDE 50 MG/1
TABLET ORAL
Qty: 180 TABLET | OUTPATIENT
Start: 2023-09-07

## 2023-09-20 ENCOUNTER — TRANSFERRED RECORDS (OUTPATIENT)
Dept: HEALTH INFORMATION MANAGEMENT | Facility: CLINIC | Age: 78
End: 2023-09-20
Payer: COMMERCIAL

## 2023-09-20 LAB — RETINOPATHY: NEGATIVE

## 2023-10-05 DIAGNOSIS — G89.4 CHRONIC PAIN SYNDROME: ICD-10-CM

## 2023-10-08 RX ORDER — TRAMADOL HYDROCHLORIDE 50 MG/1
TABLET ORAL
Qty: 180 TABLET | Refills: 0 | Status: SHIPPED | OUTPATIENT
Start: 2023-10-08 | End: 2023-11-09

## 2023-10-25 ENCOUNTER — TRANSFERRED RECORDS (OUTPATIENT)
Dept: HEALTH INFORMATION MANAGEMENT | Facility: CLINIC | Age: 78
End: 2023-10-25
Payer: COMMERCIAL

## 2023-10-25 LAB — RETINOPATHY: NEGATIVE

## 2023-10-26 ENCOUNTER — OFFICE VISIT (OUTPATIENT)
Dept: FAMILY MEDICINE | Facility: OTHER | Age: 78
End: 2023-10-26
Payer: COMMERCIAL

## 2023-10-26 VITALS
HEART RATE: 88 BPM | HEIGHT: 67 IN | WEIGHT: 192.5 LBS | SYSTOLIC BLOOD PRESSURE: 122 MMHG | TEMPERATURE: 97.7 F | RESPIRATION RATE: 16 BRPM | OXYGEN SATURATION: 95 % | DIASTOLIC BLOOD PRESSURE: 62 MMHG | BODY MASS INDEX: 30.21 KG/M2

## 2023-10-26 DIAGNOSIS — Z01.818 PRE-OP EXAM: Primary | ICD-10-CM

## 2023-10-26 DIAGNOSIS — F10.21 ALCOHOL DEPENDENCE IN REMISSION (H): ICD-10-CM

## 2023-10-26 DIAGNOSIS — N25.81 SECONDARY RENAL HYPERPARATHYROIDISM (H): ICD-10-CM

## 2023-10-26 DIAGNOSIS — H33.311 RETINAL TEAR OF RIGHT EYE: ICD-10-CM

## 2023-10-26 DIAGNOSIS — I73.9 PAD (PERIPHERAL ARTERY DISEASE) (H): ICD-10-CM

## 2023-10-26 DIAGNOSIS — G89.4 CHRONIC PAIN SYNDROME: ICD-10-CM

## 2023-10-26 DIAGNOSIS — E66.01 MORBID OBESITY DUE TO EXCESS CALORIES (H): ICD-10-CM

## 2023-10-26 DIAGNOSIS — E11.21 TYPE 2 DIABETES MELLITUS WITH DIABETIC NEPHROPATHY, WITHOUT LONG-TERM CURRENT USE OF INSULIN (H): ICD-10-CM

## 2023-10-26 DIAGNOSIS — N18.4 CKD (CHRONIC KIDNEY DISEASE) STAGE 4, GFR 15-29 ML/MIN (H): ICD-10-CM

## 2023-10-26 DIAGNOSIS — I25.810 CORONARY ARTERY DISEASE INVOLVING AUTOLOGOUS ARTERY CORONARY BYPASS GRAFT WITHOUT ANGINA PECTORIS: ICD-10-CM

## 2023-10-26 PROCEDURE — 99214 OFFICE O/P EST MOD 30 MIN: CPT | Performed by: PHYSICIAN ASSISTANT

## 2023-10-26 RX ORDER — KETOROLAC TROMETHAMINE 4 MG/ML
SOLUTION/ DROPS OPHTHALMIC
COMMUNITY
Start: 2023-10-14 | End: 2024-04-17

## 2023-10-26 RX ORDER — PREDNISOLONE ACETATE 10 MG/ML
SUSPENSION/ DROPS OPHTHALMIC
COMMUNITY
Start: 2023-10-11

## 2023-10-26 RX ORDER — ATROPINE SULFATE 10 MG/ML
SOLUTION/ DROPS OPHTHALMIC
COMMUNITY
Start: 2023-10-19 | End: 2024-04-17

## 2023-10-26 ASSESSMENT — PAIN SCALES - GENERAL: PAINLEVEL: MODERATE PAIN (4)

## 2023-10-26 NOTE — PROGRESS NOTES
[facility]  [address]  [facility] [place] [facility] [address]  Phone: [phone]  Primary Provider: Margaret Chandra  Pre-op Performing Provider: ALEJANDRA ANDREWS    PREOPERATIVE EVALUATION:  Today's date: 10/26/2023    Zahida is a 78 year old female who presents for a preoperative evaluation.      10/26/2023     9:51 AM   Additional Questions   Roomed by Brook   Accompanied by Future daughter in law         10/26/2023     9:51 AM   Patient Reported Additional Medications   Patient reports taking the following new medications none       Surgical Information:  Surgery/Procedure:Vitrectomy Right Eye  Surgery Location: [facility]  Surgeon: Unknown  Surgery Date: 10/27/2023  Time of Surgery: TBD  Where patient plans to recover: At home with family  Fax number for surgical facility: [phone]    Assessment & Plan     The proposed surgical procedure is considered LOW risk.      ICD-10-CM    1. Pre-op exam  Z01.818       2. Secondary renal hyperparathyroidism (H24)  N25.81       3. Alcohol dependence in remission (H)  F10.21       4. PAD (peripheral artery disease) (H24)  I73.9       5. Morbid obesity due to excess calories (H)  E66.01       6. Type 2 diabetes mellitus with diabetic nephropathy, without long-term current use of insulin (H)  E11.21       7. Chronic pain syndrome  G89.4       8. Coronary artery disease involving autologous artery coronary bypass graft without angina pectoris  I25.810       9. CKD (chronic kidney disease) stage 4, GFR 15-29 ml/min (H)  N18.4       10. Retinal tear of right eye  H33.311           Last A1c in May was 7.8. Sugars have been up and down lately but consistently below 200. She will follow-up with PCP after surgery. Updated A1c not ordered today as it will not affect status of emergency surgery scheduled for tomorrow.        - No identified additional risk factors other than previously addressed    Antiplatelet or  Anticoagulation Medication Instructions:  Hold aspirin and fish oil until after surgery.    Additional Medication Instructions:  Hold glipizide and diuretic the day of surgery.    RECOMMENDATION:  APPROVAL GIVEN to proceed with proposed procedure, without further diagnostic evaluation.      Subjective       HPI related to upcoming procedure: She had recent cataract surgery but unfortunately has had some complications since then and now has a right retinal tear so will be undergoing emergency surgery tomorrow as noted above.         10/26/2023     9:50 AM   Preop Questions   1. Have you ever had a heart attack or stroke? YES - CABG in 2002 with re-stenting in 2018   2. Have you ever had surgery on your heart or blood vessels, such as a stent placement, a coronary artery bypass, or surgery on an artery in your head, neck, heart, or legs? YES - CABG in 2022 with re-stenting in 2018   3. Do you have chest pain with activity? No   4. Do you have a history of  heart failure? No   5. Do you currently have a cold, bronchitis or symptoms of other infection? No   6. Do you have a cough, shortness of breath, or wheezing? No   7. Do you or anyone in your family have previous history of blood clots? No   8. Do you or does anyone in your family have a serious bleeding problem such as prolonged bleeding following surgeries or cuts? No   9. Have you ever had problems with anemia or been told to take iron pills? YES   10. Have you had any abnormal blood loss such as black, tarry or bloody stools, or abnormal vaginal bleeding? No   11. Have you ever had a blood transfusion? No   12. Are you willing to have a blood transfusion if it is medically needed before, during, or after your surgery? Yes   13. Have you or any of your relatives ever had problems with anesthesia? No   14. Do you have sleep apnea, excessive snoring or daytime drowsiness? No   15. Do you have any artifical heart valves or other implanted medical devices like a  pacemaker, defibrillator, or continuous glucose monitor? No   16. Do you have artificial joints? YES - left shoulder   17. Are you allergic to latex? No       Health Care Directive:  Patient has a Health Care Directive on file      Preoperative Review of :   reviewed - no record of controlled substances prescribed.      Status of Chronic Conditions:  See problem list for active medical problems.  Problems all longstanding and stable, except as noted/documented.  See ROS for pertinent symptoms related to these conditions.    Review of Systems  CONSTITUTIONAL: NEGATIVE for fever, chills, change in weight  INTEGUMENTARY/SKIN: NEGATIVE for worrisome rashes, moles or lesions  EYES: NEGATIVE for vision changes or irritation  ENT/MOUTH: NEGATIVE for ear, mouth and throat problems  RESP: NEGATIVE for significant cough or SOB  CV: NEGATIVE for chest pain, palpitations or peripheral edema  GI: NEGATIVE for nausea, abdominal pain, heartburn, or change in bowel habits  : NEGATIVE for frequency, dysuria, or hematuria  MUSCULOSKELETAL: NEGATIVE for significant arthralgias or myalgia  NEURO: NEGATIVE for weakness, dizziness or paresthesias  ENDOCRINE: NEGATIVE for temperature intolerance, skin/hair changes  HEME: NEGATIVE for bleeding problems  PSYCHIATRIC: NEGATIVE for changes in mood or affect    Patient Active Problem List    Diagnosis Date Noted    CKD (chronic kidney disease) stage 3, GFR 30-59 ml/min (H) 08/02/2010     Priority: High    Alcohol dependence in remission (H) 05/04/2022     Priority: Medium    CKD (chronic kidney disease) stage 4, GFR 15-29 ml/min (H) 01/12/2022     Priority: Medium    Gastroesophageal reflux disease without esophagitis 01/04/2018     Priority: Medium    S/P coronary artery stent placement 01/04/2018     Priority: Medium    Type 2 diabetes mellitus with hyperglycemia, without long-term current use of insulin (H) 12/20/2016     Priority: Medium    Hypertension goal BP (blood pressure) <  140/90 08/02/2016     Priority: Medium    Coronary artery disease involving autologous artery coronary bypass graft without angina pectoris 08/02/2016     Priority: Medium    Chronic pain syndrome 01/27/2016     Priority: Medium     Patient is followed by Data Unavailable for ongoing prescription of pain medication.  All refills should be approved by this provider, or covering partner.    Medication(s): tramadol 50 mg .   Maximum quantity per month: 180  Clinic visit frequency required: Q 3 months     Controlled substance agreement on file: Yes       Date(s): 2010    Pain Clinic evaluation in the past: No    DIRE Total Score(s):  No flowsheet data found.    [facility] website verification:  none; 09/08/2016    [email or web address]        Morbid obesity due to excess calories (H) 11/06/2015     Priority: Medium    ACP (advance care planning) 10/23/2015     Priority: Medium     Advance Care Planning 10/23/2015: ACP Review and Resources Provided:  Reviewed chart for advance care plan.  Anamaria Parra has no plan or code status on file. Discussed available resources and provided with information. Confirmed code status reflects current choices pending further ACP discussions.  Confirmed/documented legally designated decision maker(s). Added by DAYNA Payne  Advance Directive Problem List Overview:   Name Relationship Phone    Primary Health Care Agent            Alternative Health Care Agent          Discussed advance care planning with patient; however, patient declined at this time. 3/1/2012         Ovarian cyst 06/01/2014     Priority: Medium     right      Partial tear of rotator cuff 03/08/2013     Priority: Medium     Problem list name updated by automated process. Provider to review      AC (acromioclavicular) joint arthritis - right 03/08/2013     Priority: Medium    Shoulder impingement - right 03/08/2013     Priority: Medium    Osteoarthritis of shoulder - right 03/08/2013     Priority:  Medium    Hyperlipidemia LDL goal <100 03/16/2011     Priority: Medium    Peripheral neuropathy 03/16/2011     Priority: Medium    Hyperthyroidism 08/02/2010     Priority: Medium    Onychomycosis due to dermatophyte      Priority: Medium     (Problem list name updated by automated process. Provider to review and confirm.)      Psoriasis      Priority: Medium    PAD (peripheral artery disease) (H24)      Priority: Medium    Carotid stenosis      Priority: Medium     mild      Spasmodic torticollis      Priority: Medium      Past Medical History:   Diagnosis Date    ASHD (arteriosclerotic heart disease)     Carotid stenosis     mild    Diabetes mellitus     ETOHism (H)     h/o sober 05/13/97    Menopause 98    Onychomycoses     Ovarian cyst 6/2014    right    Overweight, obesity and other hyperalimentation     PAD (peripheral artery disease) (H24)     Psoriasis     Rotator cuff tear, right 2/11/2012    Smoker     quit 88    Spasmodic torticollis     Unspecified essential hypertension     Vitamin D deficiencies      Past Surgical History:   Procedure Laterality Date    ANGIOGRAM  02    with 2 stents    ANGIOGRAM  04    ANGIOGRAM  12/2017, 2 stents in RCA    at [facility]     CLOSED RX CLAVICLE FRACTURE  08/07    CORONARY ARTERY BYPASS  01/14/09    TONSILLECTOMY & ADENOIDECTOMY      [facility] ANESTH,SURGERY OF SHOULDER  02/26/07    left shoulder arthroplasty    [facility] HAND/FINGER SURGERY UNLISTED      right thumb deep lac repair     Current Outpatient Medications   Medication Sig Dispense Refill    aspirin 81 MG tablet Take 81 mg by mouth daily      atropine 1 % ophthalmic solution INSTILL 1 DROP IN RIGHT EYE EVERY DAY      B-12 OR 1000 MCG DAILY      blood glucose (ONETOUCH ULTRA) test strip TWICE DAILY TESTING OR AS DIRECTED 100 strip 3    blood glucose monitoring (NO BRAND SPECIFIED) meter device kit Use to test blood sugar 2 times daily or as directed. 1 kit 0    brimonidine (ALPHAGAN-P) 0.15 % ophthalmic solution INSTILL 1 DROP  IN LEFT EYE TWICE DAILY      dorzolamide (TRUSOPT) 2 % ophthalmic solution       FISH OIL 1000 MG OR CAPS 2 TABLETS DAILY      FOLIC ACID OR 3000 MCG DAILY      furosemide (LASIX) 40 MG tablet Take 1 tablet (40 mg) by mouth daily 90 tablet 3    glipiZIDE (GLUCOTROL XL) 10 MG 24 hr tablet Take 1 tablet (10 mg) by mouth daily 90 tablet 3    ketorolac tromethamine (ACULAR-LS) 0.4 % SOLN ophthalmic solution INSTILL 1 DROP IN RIGHT EYE FOUR TIMES DAILY      latanoprost (XALATAN) 0.005 % ophthalmic solution INSTILL 1 DROP IN LEFT EYE EVERY EVENING      lisinopril (ZESTRIL) 20 MG tablet Take 1 tablet (20 mg) by mouth daily 90 tablet 3    metoprolol succinate ER (TOPROL XL) 50 MG 24 hr tablet TAKE 1 AND 1/2 TABLETS(75 MG) BY MOUTH DAILY Strength: 50 mg 135 tablet 3    MULTI-VITAMIN OR TABS 1 TABLET DAILY      nitroGLYcerin (NITROSTAT) 0.4 MG sublingual tablet Place 1 tablet (0.4 mg) under the tongue every 5 minutes as needed for chest pain up to 3 tablets per episode. 60 tablet 1    omeprazole (PRILOSEC) 40 MG DR capsule TAKE ONE CAPSULE BY MOUTH EVERY OTHER DAY 90 capsule 1    prednisoLONE acetate (PRED FORTE) 1 % ophthalmic suspension SHAKE LIQUID AND INSTILL 1 DROP IN RIGHT EYE 6 TIMES A DAY      rosuvastatin (CRESTOR) 40 MG tablet Take 1 tablet (40 mg) by mouth daily 90 tablet 3    timolol maleate (TIMOPTIC) 0.5 % ophthalmic solution       traMADol (ULTRAM) 50 MG tablet TAKE 2 TABLETS(100 MG) BY MOUTH EVERY 8 HOURS AS NEEDED FOR SEVERE PAIN. MAX OF 6 TABLETS DAILY 180 tablet 0    VITAMIN D 1000 UNIT OR CAPS 1 CAPSULE DAILY      Wheat Dextrin (BENEFIBER PO)       blood glucose (NO BRAND SPECIFIED) lancets standard Use to test blood sugar 1-2 times daily or as directed. (Patient not taking: Reported on 10/26/2023) 100 each 3    blood glucose (NO BRAND SPECIFIED) lancets standard Use to test blood sugar 3 times daily or as directed. (Patient not taking: Reported on 10/26/2023) 100 each 3    STOOL SOFTENER OR None Entered  "(Patient not taking: Reported on 10/26/2023)         Allergies   Allergen Reactions    Dilantin [Phenytoin]      rash        Social History     Tobacco Use    Smoking status: Former     Years: 30     Types: Cigarettes     Start date: 1960     Quit date: 1990     Years since quittin.8     Passive exposure: Never    Smokeless tobacco: Never   Substance Use Topics    Alcohol use: No     Alcohol/week: 0.0 standard drinks of alcohol     Comment: history of abuse      History   Drug Use No         Objective     /62   Pulse 88   Temp 97.7  F (36.5  C) (Temporal)   Resp 16   Ht 1.702 m (5' 7\")   Wt 87.3 kg (192 lb 8 oz)   SpO2 95%   Breastfeeding No   BMI 30.15 kg/m      Physical Exam    GENERAL APPEARANCE: healthy, alert and no distress     EYES: EOMI, PERRL     HENT: ear canals and TM's normal and nose and mouth without ulcers or lesions     NECK: no adenopathy, no asymmetry, masses, or scars and thyroid normal to palpation     RESP: lungs clear to auscultation - no rales, rhonchi or wheezes     CV: regular rates and rhythm, normal S1 S2, no S3 or S4 and no murmur, click or rub     ABDOMEN:  soft, nontender, no HSM or masses and bowel sounds normal     MS: extremities normal- no gross deformities noted, no evidence of inflammation in joints, FROM in all extremities. Neck torticollis appreciated.     SKIN: no suspicious lesions or rashes     NEURO: Normal strength and tone, sensory exam grossly normal, mentation intact and speech normal. Gait not tested.     PSYCH: mentation appears normal. and affect normal/bright     LYMPHATICS: No cervical adenopathy    Recent Labs   Lab Test 23  1413 22  1519 22  0924 22  0923 22  0922 22  1515   HGB 13.1 12.6  --   --  12.4 12.9   PLT  --   --   --   --  201 172   NA  --  138 135  --   --  138   POTASSIUM  --  4.2 5.0  --   --  4.2   CR  --  1.76* 1.68*  --   --  1.41*   A1C 7.8* 8.2*  --    < >  --   --     < > = values " in this interval not displayed.        Diagnostics:  No labs were ordered during this visit.   No EKG required for low risk surgery (cataract, skin procedure, breast biopsy, etc).    Revised Cardiac Risk Index (RCRI):  The patient has the following serious cardiovascular risks for perioperative complications:   - Coronary Artery Disease (MI, positive stress test, angina, Qs on EKG) = 1 point     RCRI Interpretation: 1 point: Class II (low risk - 0.9% complication rate)         Signed Electronically by: Santhosh Wakefield PA-C  Copy of this evaluation report is provided to requesting physician.

## 2023-10-26 NOTE — PATIENT INSTRUCTIONS
Preparing for Your Surgery  Getting started  A nurse will call you to review your health history and instructions. They will give you an arrival time based on your scheduled surgery time. Please be ready to share:  Your doctor's clinic name and phone number  Your medical, surgical, and anesthesia history  A list of allergies and sensitivities  A list of medicines, including herbal treatments and over-the-counter drugs  Whether the patient has a legal guardian (ask how to send us the papers in advance)  Please tell us if you're pregnant--or if there's any chance you might be pregnant. Some surgeries may injure a fetus (unborn baby), so they require a pregnancy test. Surgeries that are safe for a fetus don't always need a test, and you can choose whether to have one.   If you have a child who's having surgery, please ask for a copy of Preparing for Your Child's Surgery.    Preparing for surgery  Within 10 to 30 days of surgery: Have a pre-op exam (sometimes called an H&P, or History and Physical). This can be done at a clinic or pre-operative center.  If you're having a , you may not need this exam. Talk to your care team.  At your pre-op exam, talk to your care team about all medicines you take. If you need to stop any medicines before surgery, ask when to start taking them again.  We do this for your safety. Many medicines can make you bleed too much during surgery. Some change how well surgery (anesthesia) drugs work.  Call your insurance company to let them know you're having surgery. (If you don't have insurance, call [phone].)  Call your clinic if there's any change in your health. This includes signs of a cold or flu (sore throat, runny nose, cough, rash, fever). It also includes a scrape or scratch near the surgery site.  If you have questions on the day of surgery, call your hospital or surgery center.  Eating and drinking guidelines  For your safety: Unless your surgeon tells you otherwise,  follow the guidelines below.  Eat and drink as usual until 8 hours before you arrive for surgery. After that, no food or milk.  Drink clear liquids until 2 hours before you arrive. These are liquids you can see through, like water, Gatorade, and Propel Water. They also include plain black coffee and tea (no cream or milk), candy, and breath mints. You can spit out gum when you arrive.  If you drink alcohol: Stop drinking it the night before surgery.  If your care team tells you to take medicine on the morning of surgery, it's okay to take it with a sip of water.  Preventing infection  Shower or bathe the night before and morning of your surgery. Follow the instructions your clinic gave you. (If no instructions, use regular soap.)  Don't shave or clip hair near your surgery site. We'll remove the hair if needed.  Don't smoke or vape the morning of surgery. You may chew nicotine gum up to 2 hours before surgery. A nicotine patch is okay.  Note: Some surgeries require you to completely quit smoking and nicotine. Check with your surgeon.  Your care team will make every effort to keep you safe from infection. We will:  Clean our hands often with soap and water (or an alcohol-based hand rub).  Clean the skin at your surgery site with a special soap that kills germs.  Give you a special gown to keep you warm. (Cold raises the risk of infection.)  Wear special hair covers, masks, gowns and gloves during surgery.  Give antibiotic medicine, if prescribed. Not all surgeries need antibiotics.  What to bring on the day of surgery  Photo ID and insurance card  Copy of your health care directive, if you have one  Glasses and hearing aids (bring cases)  You can't wear contacts during surgery  Inhaler and eye drops, if you use them (tell us about these when you arrive)  CPAP machine or breathing device, if you use them  A few personal items, if spending the night  If you have . . .  A pacemaker, ICD (cardiac defibrillator) or other  implant: Bring the ID card.  An implanted stimulator: Bring the remote control.  A legal guardian: Bring a copy of the certified (court-stamped) guardianship papers.  Please remove any jewelry, including body piercings. Leave jewelry and other valuables at home.  If you're going home the day of surgery  You must have a responsible adult drive you home. They should stay with you overnight as well.  If you don't have someone to stay with you, and you aren't safe to go home alone, we may keep you overnight. Insurance often won't pay for this.  After surgery  If it's hard to control your pain or you need more pain medicine, please call your surgeon's office.  Questions?   If you have any questions for your care team, list them here: _________________________________________________________________________________________________________________________________________________________________________ ____________________________________ ____________________________________ ____________________________________  For informational purposes only. Not to replace the advice of your health care provider. Copyright   2003, 2019 [facility]. All rights reserved. Clinically reviewed by Hanna Courtney MD. SMARTworks 327758 - REV 12/22.    How to Take Your Medication Before Surgery  Hold aspirin, fish oil, diuretic and glipizide until after surgery.

## 2023-11-07 DIAGNOSIS — G89.4 CHRONIC PAIN SYNDROME: ICD-10-CM

## 2023-11-07 DIAGNOSIS — I10 HYPERTENSION GOAL BP (BLOOD PRESSURE) < 140/90: ICD-10-CM

## 2023-11-09 RX ORDER — METOPROLOL SUCCINATE 50 MG/1
TABLET, EXTENDED RELEASE ORAL
Qty: 135 TABLET | Refills: 0 | Status: SHIPPED | OUTPATIENT
Start: 2023-11-09 | End: 2024-01-31

## 2023-11-09 RX ORDER — TRAMADOL HYDROCHLORIDE 50 MG/1
TABLET ORAL
Qty: 180 TABLET | Refills: 0 | Status: SHIPPED | OUTPATIENT
Start: 2023-11-09 | End: 2023-12-07

## 2023-12-07 DIAGNOSIS — G89.4 CHRONIC PAIN SYNDROME: ICD-10-CM

## 2023-12-07 RX ORDER — TRAMADOL HYDROCHLORIDE 50 MG/1
TABLET ORAL
Qty: 180 TABLET | Refills: 0 | Status: SHIPPED | OUTPATIENT
Start: 2023-12-07 | End: 2024-01-04

## 2024-01-03 DIAGNOSIS — G89.4 CHRONIC PAIN SYNDROME: ICD-10-CM

## 2024-01-04 RX ORDER — TRAMADOL HYDROCHLORIDE 50 MG/1
TABLET ORAL
Qty: 180 TABLET | Refills: 0 | Status: SHIPPED | OUTPATIENT
Start: 2024-01-04 | End: 2024-02-02

## 2024-01-31 DIAGNOSIS — K21.9 GASTROESOPHAGEAL REFLUX DISEASE WITHOUT ESOPHAGITIS: ICD-10-CM

## 2024-01-31 DIAGNOSIS — E78.5 HYPERLIPIDEMIA LDL GOAL <100: ICD-10-CM

## 2024-01-31 DIAGNOSIS — E11.65 TYPE 2 DIABETES MELLITUS WITH HYPERGLYCEMIA, WITHOUT LONG-TERM CURRENT USE OF INSULIN (H): ICD-10-CM

## 2024-01-31 DIAGNOSIS — G89.4 CHRONIC PAIN SYNDROME: ICD-10-CM

## 2024-01-31 DIAGNOSIS — I10 HYPERTENSION GOAL BP (BLOOD PRESSURE) < 140/90: ICD-10-CM

## 2024-01-31 RX ORDER — OMEPRAZOLE 40 MG/1
CAPSULE, DELAYED RELEASE ORAL
Qty: 90 CAPSULE | Refills: 0 | Status: SHIPPED | OUTPATIENT
Start: 2024-01-31 | End: 2024-06-07

## 2024-01-31 RX ORDER — BLOOD SUGAR DIAGNOSTIC
STRIP MISCELLANEOUS
Qty: 100 STRIP | Refills: 3 | Status: SHIPPED | OUTPATIENT
Start: 2024-01-31

## 2024-01-31 RX ORDER — ROSUVASTATIN CALCIUM 40 MG/1
40 TABLET, COATED ORAL DAILY
Qty: 90 TABLET | Refills: 0 | Status: SHIPPED | OUTPATIENT
Start: 2024-01-31 | End: 2024-09-23

## 2024-01-31 RX ORDER — METOPROLOL SUCCINATE 50 MG/1
TABLET, EXTENDED RELEASE ORAL
Qty: 135 TABLET | Refills: 0 | Status: SHIPPED | OUTPATIENT
Start: 2024-01-31 | End: 2024-06-07

## 2024-02-02 RX ORDER — TRAMADOL HYDROCHLORIDE 50 MG/1
TABLET ORAL
Qty: 180 TABLET | Refills: 0 | Status: SHIPPED | OUTPATIENT
Start: 2024-02-02 | End: 2024-06-08

## 2024-02-12 ENCOUNTER — TRANSFERRED RECORDS (OUTPATIENT)
Dept: MULTI SPECIALTY CLINIC | Facility: CLINIC | Age: 79
End: 2024-02-12

## 2024-02-12 LAB — RETINOPATHY: NORMAL

## 2024-02-23 ENCOUNTER — LAB REQUISITION (OUTPATIENT)
Dept: LAB | Facility: CLINIC | Age: 79
End: 2024-02-23
Payer: COMMERCIAL

## 2024-02-23 DIAGNOSIS — D64.9 ANEMIA, UNSPECIFIED: ICD-10-CM

## 2024-02-23 DIAGNOSIS — I10 ESSENTIAL (PRIMARY) HYPERTENSION: ICD-10-CM

## 2024-02-26 LAB
ANION GAP SERPL CALCULATED.3IONS-SCNC: 10 MMOL/L (ref 7–15)
BUN SERPL-MCNC: 30.2 MG/DL (ref 8–23)
CALCIUM SERPL-MCNC: 9.3 MG/DL (ref 8.8–10.2)
CHLORIDE SERPL-SCNC: 107 MMOL/L (ref 98–107)
CREAT SERPL-MCNC: 1.18 MG/DL (ref 0.51–0.95)
DEPRECATED HCO3 PLAS-SCNC: 26 MMOL/L (ref 22–29)
EGFRCR SERPLBLD CKD-EPI 2021: 46 ML/MIN/1.73M2
ERYTHROCYTE [DISTWIDTH] IN BLOOD BY AUTOMATED COUNT: 13.5 % (ref 10–15)
GLUCOSE SERPL-MCNC: 124 MG/DL (ref 70–99)
HCT VFR BLD AUTO: 39 % (ref 35–47)
HGB BLD-MCNC: 12.4 G/DL (ref 11.7–15.7)
MCH RBC QN AUTO: 30.3 PG (ref 26.5–33)
MCHC RBC AUTO-ENTMCNC: 31.8 G/DL (ref 31.5–36.5)
MCV RBC AUTO: 95 FL (ref 78–100)
PLATELET # BLD AUTO: 326 10E3/UL (ref 150–450)
POTASSIUM SERPL-SCNC: 4.4 MMOL/L (ref 3.4–5.3)
RBC # BLD AUTO: 4.09 10E6/UL (ref 3.8–5.2)
SODIUM SERPL-SCNC: 143 MMOL/L (ref 135–145)
VIT D+METAB SERPL-MCNC: 52 NG/ML (ref 20–50)
WBC # BLD AUTO: 6.4 10E3/UL (ref 4–11)

## 2024-02-26 PROCEDURE — P9604 ONE-WAY ALLOW PRORATED TRIP: HCPCS | Mod: ORL | Performed by: FAMILY MEDICINE

## 2024-02-26 PROCEDURE — 85027 COMPLETE CBC AUTOMATED: CPT | Mod: ORL | Performed by: FAMILY MEDICINE

## 2024-02-26 PROCEDURE — 82306 VITAMIN D 25 HYDROXY: CPT | Mod: ORL | Performed by: FAMILY MEDICINE

## 2024-02-26 PROCEDURE — 80048 BASIC METABOLIC PNL TOTAL CA: CPT | Mod: ORL | Performed by: FAMILY MEDICINE

## 2024-02-26 PROCEDURE — 36415 COLL VENOUS BLD VENIPUNCTURE: CPT | Mod: ORL | Performed by: FAMILY MEDICINE

## 2024-02-27 ENCOUNTER — TRANSFERRED RECORDS (OUTPATIENT)
Dept: HEALTH INFORMATION MANAGEMENT | Facility: CLINIC | Age: 79
End: 2024-02-27
Payer: COMMERCIAL

## 2024-02-27 ENCOUNTER — LAB REQUISITION (OUTPATIENT)
Dept: LAB | Facility: CLINIC | Age: 79
End: 2024-02-27
Payer: COMMERCIAL

## 2024-02-27 DIAGNOSIS — N18.30 CHRONIC KIDNEY DISEASE, STAGE 3 UNSPECIFIED (H): ICD-10-CM

## 2024-02-28 PROCEDURE — P9604 ONE-WAY ALLOW PRORATED TRIP: HCPCS | Mod: ORL | Performed by: FAMILY MEDICINE

## 2024-02-28 PROCEDURE — 80048 BASIC METABOLIC PNL TOTAL CA: CPT | Mod: ORL | Performed by: FAMILY MEDICINE

## 2024-02-28 PROCEDURE — 36415 COLL VENOUS BLD VENIPUNCTURE: CPT | Mod: ORL | Performed by: FAMILY MEDICINE

## 2024-02-29 ENCOUNTER — LAB REQUISITION (OUTPATIENT)
Dept: LAB | Facility: CLINIC | Age: 79
End: 2024-02-29
Payer: COMMERCIAL

## 2024-02-29 DIAGNOSIS — I10 ESSENTIAL (PRIMARY) HYPERTENSION: ICD-10-CM

## 2024-02-29 DIAGNOSIS — D64.9 ANEMIA, UNSPECIFIED: ICD-10-CM

## 2024-02-29 LAB
ANION GAP SERPL CALCULATED.3IONS-SCNC: 12 MMOL/L (ref 7–15)
BUN SERPL-MCNC: 39.6 MG/DL (ref 8–23)
CALCIUM SERPL-MCNC: 9.5 MG/DL (ref 8.8–10.2)
CHLORIDE SERPL-SCNC: 104 MMOL/L (ref 98–107)
CREAT SERPL-MCNC: 1.5 MG/DL (ref 0.51–0.95)
DEPRECATED HCO3 PLAS-SCNC: 24 MMOL/L (ref 22–29)
EGFRCR SERPLBLD CKD-EPI 2021: 35 ML/MIN/1.73M2
GLUCOSE SERPL-MCNC: 150 MG/DL (ref 70–99)
POTASSIUM SERPL-SCNC: 5.5 MMOL/L (ref 3.4–5.3)
SODIUM SERPL-SCNC: 140 MMOL/L (ref 135–145)

## 2024-03-01 ENCOUNTER — LAB REQUISITION (OUTPATIENT)
Dept: LAB | Facility: CLINIC | Age: 79
End: 2024-03-01
Payer: COMMERCIAL

## 2024-03-01 DIAGNOSIS — I10 ESSENTIAL (PRIMARY) HYPERTENSION: ICD-10-CM

## 2024-03-01 DIAGNOSIS — D64.9 ANEMIA, UNSPECIFIED: ICD-10-CM

## 2024-03-01 LAB
ERYTHROCYTE [DISTWIDTH] IN BLOOD BY AUTOMATED COUNT: 13.4 % (ref 10–15)
HCT VFR BLD AUTO: 35 % (ref 35–47)
HGB BLD-MCNC: 11.2 G/DL (ref 11.7–15.7)
MCH RBC QN AUTO: 30.4 PG (ref 26.5–33)
MCHC RBC AUTO-ENTMCNC: 32 G/DL (ref 31.5–36.5)
MCV RBC AUTO: 95 FL (ref 78–100)
PLATELET # BLD AUTO: 255 10E3/UL (ref 150–450)
RBC # BLD AUTO: 3.68 10E6/UL (ref 3.8–5.2)
WBC # BLD AUTO: 8 10E3/UL (ref 4–11)

## 2024-03-01 PROCEDURE — P9604 ONE-WAY ALLOW PRORATED TRIP: HCPCS | Mod: ORL | Performed by: FAMILY MEDICINE

## 2024-03-01 PROCEDURE — 85027 COMPLETE CBC AUTOMATED: CPT | Mod: ORL | Performed by: FAMILY MEDICINE

## 2024-03-01 PROCEDURE — 80048 BASIC METABOLIC PNL TOTAL CA: CPT | Mod: ORL | Performed by: FAMILY MEDICINE

## 2024-03-01 PROCEDURE — 36415 COLL VENOUS BLD VENIPUNCTURE: CPT | Mod: ORL | Performed by: FAMILY MEDICINE

## 2024-03-02 LAB
ANION GAP SERPL CALCULATED.3IONS-SCNC: 12 MMOL/L (ref 7–15)
BUN SERPL-MCNC: 38.2 MG/DL (ref 8–23)
CALCIUM SERPL-MCNC: 8.6 MG/DL (ref 8.8–10.2)
CHLORIDE SERPL-SCNC: 104 MMOL/L (ref 98–107)
CREAT SERPL-MCNC: 1.52 MG/DL (ref 0.51–0.95)
DEPRECATED HCO3 PLAS-SCNC: 23 MMOL/L (ref 22–29)
EGFRCR SERPLBLD CKD-EPI 2021: 34 ML/MIN/1.73M2
GLUCOSE SERPL-MCNC: 175 MG/DL (ref 70–99)
POTASSIUM SERPL-SCNC: 3.8 MMOL/L (ref 3.4–5.3)
SODIUM SERPL-SCNC: 139 MMOL/L (ref 135–145)

## 2024-03-04 LAB
ANION GAP SERPL CALCULATED.3IONS-SCNC: 11 MMOL/L (ref 7–15)
BUN SERPL-MCNC: 29.8 MG/DL (ref 8–23)
CALCIUM SERPL-MCNC: 9.3 MG/DL (ref 8.8–10.2)
CHLORIDE SERPL-SCNC: 105 MMOL/L (ref 98–107)
CREAT SERPL-MCNC: 1.36 MG/DL (ref 0.51–0.95)
DEPRECATED HCO3 PLAS-SCNC: 24 MMOL/L (ref 22–29)
EGFRCR SERPLBLD CKD-EPI 2021: 39 ML/MIN/1.73M2
ERYTHROCYTE [DISTWIDTH] IN BLOOD BY AUTOMATED COUNT: 13.5 % (ref 10–15)
GLUCOSE SERPL-MCNC: 113 MG/DL (ref 70–99)
HCT VFR BLD AUTO: 39.5 % (ref 35–47)
HGB BLD-MCNC: 12.4 G/DL (ref 11.7–15.7)
MCH RBC QN AUTO: 30.7 PG (ref 26.5–33)
MCHC RBC AUTO-ENTMCNC: 31.4 G/DL (ref 31.5–36.5)
MCV RBC AUTO: 98 FL (ref 78–100)
PLATELET # BLD AUTO: 292 10E3/UL (ref 150–450)
POTASSIUM SERPL-SCNC: 4.1 MMOL/L (ref 3.4–5.3)
RBC # BLD AUTO: 4.04 10E6/UL (ref 3.8–5.2)
SODIUM SERPL-SCNC: 140 MMOL/L (ref 135–145)
WBC # BLD AUTO: 6 10E3/UL (ref 4–11)

## 2024-03-04 PROCEDURE — 36415 COLL VENOUS BLD VENIPUNCTURE: CPT | Mod: ORL | Performed by: FAMILY MEDICINE

## 2024-03-04 PROCEDURE — 80048 BASIC METABOLIC PNL TOTAL CA: CPT | Mod: ORL | Performed by: FAMILY MEDICINE

## 2024-03-04 PROCEDURE — P9604 ONE-WAY ALLOW PRORATED TRIP: HCPCS | Mod: ORL | Performed by: FAMILY MEDICINE

## 2024-03-04 PROCEDURE — 85027 COMPLETE CBC AUTOMATED: CPT | Mod: ORL | Performed by: FAMILY MEDICINE

## 2024-03-05 ENCOUNTER — TELEPHONE (OUTPATIENT)
Dept: FAMILY MEDICINE | Facility: CLINIC | Age: 79
End: 2024-03-05
Payer: COMMERCIAL

## 2024-03-06 NOTE — TELEPHONE ENCOUNTER
General Call    Contacts         Type Contact Phone/Fax    03/05/2024 06:43 PM CST Phone (Incoming) Zahida Parra (Self) [phone] (H)          Reason for Call: Pt is in transitional care and they recommend a podiatrist for pt and need a referral from Dr. hCandra. Can you call pt's son, Saurav, at [phone]? Thank you!    Okay to leave a detailed message?: Yes at [phone]

## 2024-03-06 NOTE — TELEPHONE ENCOUNTER
No consent on file for Saurav, as requested in previous note. Unable to speak to this person without a verbal from the patient.     RN attempted to dial the patient to get verbal or more information regarding need for referral. RN left message to return call to clinic [phone].  (RN did not leave specific details on voicemail for confidential reasons)      Betsy Downey RN on 3/6/2024 at 10:46 AM

## 2024-03-07 ENCOUNTER — LAB REQUISITION (OUTPATIENT)
Dept: LAB | Facility: CLINIC | Age: 79
End: 2024-03-07
Payer: COMMERCIAL

## 2024-03-07 DIAGNOSIS — N18.30 CHRONIC KIDNEY DISEASE, STAGE 3 UNSPECIFIED (H): ICD-10-CM

## 2024-03-07 DIAGNOSIS — I13.0 HYPERTENSIVE HEART AND CHRONIC KIDNEY DISEASE WITH HEART FAILURE AND STAGE 1 THROUGH STAGE 4 CHRONIC KIDNEY DISEASE, OR UNSPECIFIED CHRONIC KIDNEY DISEASE (H): ICD-10-CM

## 2024-03-08 LAB
ANION GAP SERPL CALCULATED.3IONS-SCNC: 8 MMOL/L (ref 7–15)
BUN SERPL-MCNC: 34.1 MG/DL (ref 8–23)
CALCIUM SERPL-MCNC: 9.1 MG/DL (ref 8.8–10.2)
CHLORIDE SERPL-SCNC: 103 MMOL/L (ref 98–107)
CREAT SERPL-MCNC: 1.46 MG/DL (ref 0.51–0.95)
DEPRECATED HCO3 PLAS-SCNC: 29 MMOL/L (ref 22–29)
EGFRCR SERPLBLD CKD-EPI 2021: 36 ML/MIN/1.73M2
GLUCOSE SERPL-MCNC: 84 MG/DL (ref 70–99)
POTASSIUM SERPL-SCNC: 4.1 MMOL/L (ref 3.4–5.3)
SODIUM SERPL-SCNC: 140 MMOL/L (ref 135–145)

## 2024-03-08 PROCEDURE — P9604 ONE-WAY ALLOW PRORATED TRIP: HCPCS | Mod: ORL | Performed by: FAMILY MEDICINE

## 2024-03-08 PROCEDURE — 36415 COLL VENOUS BLD VENIPUNCTURE: CPT | Mod: ORL | Performed by: FAMILY MEDICINE

## 2024-03-08 PROCEDURE — 80048 BASIC METABOLIC PNL TOTAL CA: CPT | Mod: ORL | Performed by: FAMILY MEDICINE

## 2024-03-13 ENCOUNTER — MEDICAL CORRESPONDENCE (OUTPATIENT)
Dept: HEALTH INFORMATION MANAGEMENT | Facility: CLINIC | Age: 79
End: 2024-03-13
Payer: COMMERCIAL

## 2024-03-14 ENCOUNTER — MEDICAL CORRESPONDENCE (OUTPATIENT)
Dept: HEALTH INFORMATION MANAGEMENT | Facility: CLINIC | Age: 79
End: 2024-03-14

## 2024-03-14 NOTE — TELEPHONE ENCOUNTER
Patient's son Ministerio called back.     I reminded him that we need the Consent to communicate form completed and he verbalized a good understanding.    He states that the Podiatrist only visits the TCU  once a month and his mom is actually being discharged to home tomorrow.   They hope to place Zahida in an Assisted Living facility soon.     Ministerio states that his mom has been referred to Podiatry in the past, however she has never made an appointment.     They are willing to schedule a virtual visit to discuss the Podiatry referral if needed.     Please review and advise.     Fabiana Ignacio RN BSN  [facility]

## 2024-03-14 NOTE — TELEPHONE ENCOUNTER
Called Saurav below.  Patient is in TCU.  Unable to give any information, no consent to communicate.  May also ask TCU that she is at for referral if needed.    Courtney BSN RN  Triage Nurse  [facility]

## 2024-03-19 ENCOUNTER — MEDICAL CORRESPONDENCE (OUTPATIENT)
Dept: HEALTH INFORMATION MANAGEMENT | Facility: CLINIC | Age: 79
End: 2024-03-19

## 2024-03-19 ENCOUNTER — TELEPHONE (OUTPATIENT)
Dept: FAMILY MEDICINE | Facility: CLINIC | Age: 79
End: 2024-03-19
Payer: COMMERCIAL

## 2024-03-19 NOTE — TELEPHONE ENCOUNTER
Routing to provider    Home care calling for requested home care orders        Home Care is calling regarding an established patient with [facility].       Requesting orders from: Margaret Chandra  Provider is following patient: Yes  Is this a 60-day recertification request?  No    Orders Requested    Physical Therapy  Request for initial certification (first set of orders)   Frequency:  1x/wk for 1 wks ,then 2x week for 2 weeks then 1 x week for 1 week ,then 1x every 2 weeks for 4 weeks      Focusis therapeutic exercise, functional activity , gait and balance training    Occupational Therapy  Request for initial certification (first set of orders)   Frequency:  1x/wk for 1 wks for ADL's    HHA 1  X week for 3 weeks for bathing and grooming      Patient also asking for tramodol to be every 4 hours not every 6 hours  as ordered    Patient wants to discontinue ambien,    Patient would like to use docusate not Miralax prn     Patient would like order for Polyethylene glycol eye drops OTC.     Confirmed ok to leave a detailed message with call back.  Contact information confirmed and updated as needed.    [facility] care [phone]    Christine M Klisch, RN

## 2024-03-22 ENCOUNTER — TELEPHONE (OUTPATIENT)
Dept: FAMILY MEDICINE | Facility: CLINIC | Age: 79
End: 2024-03-22
Payer: COMMERCIAL

## 2024-03-22 NOTE — TELEPHONE ENCOUNTER
Noted.   Verbal OK for Physical Therapy Orders.   Patient is overdue for a Med check visit - please have her schedule a Tele Med check visit. We can address additional concerns below.

## 2024-03-22 NOTE — TELEPHONE ENCOUNTER
Forms received from: [facility]   Phone number listed: [phone]   Fax listed: [phone]  Date received: 3/19/24  Form description: Physician order ID # [id number] & [id number]/home health certification & plan of care  Once forms are completed, please return to [facility] via fax.  Is patient requesting to be contacted when forms are completed: na  Phone: na  Form placed:  Dr. Prateek Duffy

## 2024-03-22 NOTE — TELEPHONE ENCOUNTER
RN left Detailed voicemail to Kash PT for physical therapy orders below.     RN attempted to call pt and states that she did not request medications and states that she is home now and will schedule an appointment with pcp at a later time as she acknowledges a med check is needed. Pt to call clinic at a later time to schedule appointment with pcp when her son has availability to take her to appointment.     Micaela Hill RN on 3/22/2024 at 10:18 AM

## 2024-03-25 ENCOUNTER — TELEPHONE (OUTPATIENT)
Dept: FAMILY MEDICINE | Facility: CLINIC | Age: 79
End: 2024-03-25
Payer: COMMERCIAL

## 2024-03-26 ENCOUNTER — TELEPHONE (OUTPATIENT)
Dept: FAMILY MEDICINE | Facility: CLINIC | Age: 79
End: 2024-03-26
Payer: COMMERCIAL

## 2024-03-26 NOTE — TELEPHONE ENCOUNTER
Forms received from: [facility]    Phone number listed: [phone]   Fax listed: [phone]  Date received: 3/21/24  Form description: Client coordination note report  Once forms are completed, please return to [facility]  via fax.  Is patient requesting to be contacted when forms are completed: na  Phone: na  Form placed:  Dr. Prateek Duffy

## 2024-03-27 ENCOUNTER — TELEPHONE (OUTPATIENT)
Dept: FAMILY MEDICINE | Facility: CLINIC | Age: 79
End: 2024-03-27
Payer: COMMERCIAL

## 2024-03-27 ENCOUNTER — MEDICAL CORRESPONDENCE (OUTPATIENT)
Dept: HEALTH INFORMATION MANAGEMENT | Facility: CLINIC | Age: 79
End: 2024-03-27
Payer: COMMERCIAL

## 2024-03-27 DIAGNOSIS — Z53.9 DIAGNOSIS NOT YET DEFINED: Primary | ICD-10-CM

## 2024-03-27 PROCEDURE — G0180 MD CERTIFICATION HHA PATIENT: HCPCS | Performed by: FAMILY MEDICINE

## 2024-03-27 NOTE — TELEPHONE ENCOUNTER
Forms received from: [facility]   Phone number listed: [phone]   Fax listed: [phone]  Date received: 3/26/24  Form description: Physician order ID # [id number] & Add on discipline ID # [id number]  Once forms are completed, please return to [facility] via fax.  Is patient requesting to be contacted when forms are completed: na  Phone: na  Form placed:  Dr. Prateek Duffy

## 2024-03-27 NOTE — TELEPHONE ENCOUNTER
Subjective:     Interval History: 12/13/22-Patients chart is reviewed. Note she was admitted originally on 12/9 with shortness of breath and findings of pneumonia.  She has a history of COPD as well.  Patient reportedly has had some ongoing weight loss and decreased oral intake as well.  Speech therapy did evaluate her with recommendations for thin liquid diet as well as findings of Candida.  According to her chart review she had reportedly has had prior esophageal dilations as well however there are no records in our facility database supporting this here.  Patient's family reports that she has had some ongoing dysphagia and decreased oral intake therefore she was evaluated by Dr. Sexton on 12/10 however no endoscopy was performed at that time.  GI has now been reconsulted to evaluate for possible EGD versus possible PEG placement.  Patient is seen resting in bed.  She will attempt to answer questions however uncertain as to her mental status at this time due to no family available.  It is noted on chart review that patient's daughter states she is not normally confused and was noted to be sitting more so this morning.  She does state that she just does not want to eat and she seems indicate it does not hurt when she does eat.  She does continue at this time on O2 via nonrebreather.  Noted on yesterday she did have a upper GI done however patient was unable to tolerate drinking the barium therefore no fluoroscopy exam was performed.    Review of Systems   Unable to perform ROS: Mental status change   Objective:     Vital Signs (Most Recent):  Temp: 97.6 °F (36.4 °C) (12/14/22 0800)  Pulse: 60 (12/14/22 0800)  Resp: 20 (12/14/22 0800)  BP: (!) 174/63 (12/14/22 0800)  SpO2: 97 % (12/14/22 0800)   Vital Signs (24h Range):  Temp:  [96 °F (35.6 °C)-98.6 °F (37 °C)] 97.6 °F (36.4 °C)  Pulse:  [52-63] 59  Resp:  [16-20] 20  SpO2:  [92 %-98 %] 97 %  BP: (156-178)/(54-71) 174/63     Weight: 39.1 kg (86 lb 1.6 oz)  Forms faxed to [phone]. Sent to scanning.   (12/13/22 0600)  Body mass index is 16.27 kg/m².      Intake/Output Summary (Last 24 hours) at 12/14/2022 1139  Last data filed at 12/13/2022 1458  Gross per 24 hour   Intake --   Output 750 ml   Net -750 ml       Lines/Drains/Airways       Peripherally Inserted Central Catheter Line  Duration             PICC Double Lumen 12/14/22 0954 left brachial <1 day              Peripheral Intravenous Line  Duration                  External Jugular IV 12/14/22 0640 <1 day                    Physical Exam  Vitals and nursing note reviewed. Exam conducted with a chaperone present.   Constitutional:       Appearance: Normal appearance. She is cachectic. She is ill-appearing.   HENT:      Head: Normocephalic.      Nose: Nose normal. No congestion or rhinorrhea.      Mouth/Throat:      Mouth: Mucous membranes are dry.      Pharynx: Oropharynx is clear. No oropharyngeal exudate or posterior oropharyngeal erythema.   Eyes:      General: No scleral icterus.     Pupils: Pupils are equal, round, and reactive to light.   Cardiovascular:      Rate and Rhythm: Normal rate and regular rhythm.      Pulses: Normal pulses.      Heart sounds: Normal heart sounds. No murmur heard.  Pulmonary:      Effort: Pulmonary effort is normal.      Breath sounds: No stridor. Rhonchi present. No wheezing or rales.   Abdominal:      General: Abdomen is flat. Bowel sounds are normal. There is no distension.      Palpations: Abdomen is soft. There is mass.      Tenderness: There is no guarding.   Musculoskeletal:      Cervical back: Normal range of motion.      Right lower leg: No edema.      Left lower leg: No edema.   Skin:     General: Skin is warm and dry.      Findings: Bruising present.   Neurological:      Mental Status: She is alert. She is disoriented.      Motor: Weakness present.   Psychiatric:      Comments: Unable to fully assess       Significant Labs:  All pertinent lab results from the last 24 hours have been reviewed.      Significant  Imaging:  Imaging results within the past 24 hours have been reviewed.

## 2024-04-17 ENCOUNTER — OFFICE VISIT (OUTPATIENT)
Dept: FAMILY MEDICINE | Facility: CLINIC | Age: 79
End: 2024-04-17
Payer: COMMERCIAL

## 2024-04-17 VITALS
TEMPERATURE: 97.9 F | OXYGEN SATURATION: 97 % | HEART RATE: 95 BPM | DIASTOLIC BLOOD PRESSURE: 72 MMHG | RESPIRATION RATE: 20 BRPM | SYSTOLIC BLOOD PRESSURE: 124 MMHG

## 2024-04-17 DIAGNOSIS — N18.4 CKD (CHRONIC KIDNEY DISEASE) STAGE 4, GFR 15-29 ML/MIN (H): ICD-10-CM

## 2024-04-17 DIAGNOSIS — B35.1 ONYCHOMYCOSIS OF MULTIPLE TOENAILS WITH TYPE 2 DIABETES MELLITUS (H): ICD-10-CM

## 2024-04-17 DIAGNOSIS — E11.21 TYPE 2 DIABETES MELLITUS WITH DIABETIC NEPHROPATHY, WITHOUT LONG-TERM CURRENT USE OF INSULIN (H): Primary | ICD-10-CM

## 2024-04-17 DIAGNOSIS — E78.5 HYPERLIPIDEMIA LDL GOAL <100: ICD-10-CM

## 2024-04-17 DIAGNOSIS — Z23 NEED FOR VACCINATION: ICD-10-CM

## 2024-04-17 DIAGNOSIS — Z78.0 POSTMENOPAUSAL ESTROGEN DEFICIENCY: ICD-10-CM

## 2024-04-17 DIAGNOSIS — E11.69 ONYCHOMYCOSIS OF MULTIPLE TOENAILS WITH TYPE 2 DIABETES MELLITUS (H): ICD-10-CM

## 2024-04-17 DIAGNOSIS — I25.810 CORONARY ARTERY DISEASE INVOLVING AUTOLOGOUS ARTERY CORONARY BYPASS GRAFT WITHOUT ANGINA PECTORIS: ICD-10-CM

## 2024-04-17 LAB — HBA1C MFR BLD: 7.4 % (ref 0–5.6)

## 2024-04-17 PROCEDURE — 80053 COMPREHEN METABOLIC PANEL: CPT | Performed by: FAMILY MEDICINE

## 2024-04-17 PROCEDURE — 84443 ASSAY THYROID STIM HORMONE: CPT | Performed by: FAMILY MEDICINE

## 2024-04-17 PROCEDURE — 82043 UR ALBUMIN QUANTITATIVE: CPT | Performed by: FAMILY MEDICINE

## 2024-04-17 PROCEDURE — 80061 LIPID PANEL: CPT | Performed by: FAMILY MEDICINE

## 2024-04-17 PROCEDURE — 83036 HEMOGLOBIN GLYCOSYLATED A1C: CPT | Performed by: FAMILY MEDICINE

## 2024-04-17 PROCEDURE — 99214 OFFICE O/P EST MOD 30 MIN: CPT | Mod: 25 | Performed by: FAMILY MEDICINE

## 2024-04-17 PROCEDURE — 82570 ASSAY OF URINE CREATININE: CPT | Performed by: FAMILY MEDICINE

## 2024-04-17 PROCEDURE — 91320 SARSCV2 VAC 30MCG TRS-SUC IM: CPT | Performed by: FAMILY MEDICINE

## 2024-04-17 PROCEDURE — 36415 COLL VENOUS BLD VENIPUNCTURE: CPT | Performed by: FAMILY MEDICINE

## 2024-04-17 PROCEDURE — 90480 ADMN SARSCOV2 VAC 1/ONLY CMP: CPT | Performed by: FAMILY MEDICINE

## 2024-04-17 RX ORDER — RESPIRATORY SYNCYTIAL VIRUS VACCINE 120MCG/0.5
0.5 KIT INTRAMUSCULAR ONCE
Qty: 1 EACH | Refills: 0 | Status: CANCELLED | OUTPATIENT
Start: 2024-04-17 | End: 2024-04-17

## 2024-04-17 NOTE — LETTER
April 25, 2024      Zahida Parra  [address] [place] [address]        Dear ,    We are writing to inform you of your test results.    Your recent labs looked good.  Thyroid function test was normal.     Cholesterol panel was normal. HDL (good cholesterol) was a little low but improved compared to a year ago. Continue efforts to eat foods rich in omega 3 fatty acids.     Urine microalbumin test showed evidence of a blood protein (albumin) in your urine, a complication of diabetes known as microalbuminuria.  It's important that we keep your blood pressure and diabetes under tight control to prevent further kidney damage. Will repeat this urine test in a year.     Complete Metabolic Panel (panel that checks liver function, kidney function and electrolytes) was normal. Kidney function remains stable. Glucose was slightly elevated.  A1c was within goal at 7.4. This means that diabetes is well controlled at this time.  Continue your current medications and maintain a healthy lifestyle.    Resulted Orders   TSH WITH FREE T4 REFLEX   Result Value Ref Range    TSH 0.74 0.30 - 4.20 uIU/mL   Lipid panel reflex to direct LDL Fasting   Result Value Ref Range    Cholesterol 125 <200 mg/dL    Triglycerides 77 <150 mg/dL    Direct Measure HDL 47 (L) >=50 mg/dL    LDL Cholesterol Calculated 63 <=100 mg/dL    Non HDL Cholesterol 78 <130 mg/dL    Patient Fasting > 8hrs? Unknown     Narrative    Cholesterol  Desirable:  <200 mg/dL    Triglycerides  Normal:  Less than 150 mg/dL  Borderline High:  150-199 mg/dL  High:  200-499 mg/dL  Very High:  Greater than or equal to 500 mg/dL    Direct Measure HDL  Female:  Greater than or equal to 50 mg/dL   Male:  Greater than or equal to 40 mg/dL    LDL Cholesterol  Desirable:  <100mg/dL  Above Desirable:  100-129 mg/dL   Borderline High:  130-159 mg/dL   High:  160-189 mg/dL   Very High:  >= 190 mg/dL    Non HDL Cholesterol  Desirable:  130 mg/dL  Above Desirable:  130-159  mg/dL  Borderline High:  160-189 mg/dL  High:  190-219 mg/dL  Very High:  Greater than or equal to 220 mg/dL   COMPREHENSIVE METABOLIC PANEL   Result Value Ref Range    Sodium 137 135 - 145 mmol/L      Comment:      Reference intervals for this test were updated on 09/26/2023 to more accurately reflect our healthy population. There may be differences in the flagging of prior results with similar values performed with this method. Interpretation of those prior results can be made in the context of the updated reference intervals.     Potassium 4.8 3.4 - 5.3 mmol/L    Carbon Dioxide (CO2) 25 22 - 29 mmol/L    Anion Gap 10 7 - 15 mmol/L    Urea Nitrogen 17.2 8.0 - 23.0 mg/dL    Creatinine 1.18 (H) 0.51 - 0.95 mg/dL    GFR Estimate 46 (L) >60 mL/min/1.73m2    Calcium 9.5 8.8 - 10.2 mg/dL    Chloride 102 98 - 107 mmol/L    Glucose 142 (H) 70 - 99 mg/dL    Alkaline Phosphatase 74 40 - 150 U/L      Comment:      Reference intervals for this test were updated on 11/14/2023 to more accurately reflect our healthy population. There may be differences in the flagging of prior results with similar values performed with this method. Interpretation of those prior results can be made in the context of the updated reference intervals.    AST 18 0 - 45 U/L      Comment:      Reference intervals for this test were updated on 6/12/2023 to more accurately reflect our healthy population. There may be differences in the flagging of prior results with similar values performed with this method. Interpretation of those prior results can be made in the context of the updated reference intervals.    ALT 16 0 - 50 U/L      Comment:      Reference intervals for this test were updated on 6/12/2023 to more accurately reflect our healthy population. There may be differences in the flagging of prior results with similar values performed with this method. Interpretation of those prior results can be made in the context of the updated reference  intervals.      Protein Total 6.9 6.4 - 8.3 g/dL    Albumin 3.9 3.5 - 5.2 g/dL    Bilirubin Total 0.6 <=1.2 mg/dL   Albumin Random Urine Quantitative with Creat Ratio   Result Value Ref Range    Creatinine Urine mg/dL 62.2 mg/dL      Comment:      The reference ranges have not been established in urine creatinine. The results should be integrated into the clinical context for interpretation.    Albumin Urine mg/L 51.6 mg/L      Comment:      The reference ranges have not been established in urine albumin. The results should be integrated into the clinical context for interpretation.    Albumin Urine mg/g Cr 82.96 (H) 0.00 - 25.00 mg/g Cr      Comment:      Microalbuminuria is defined as an albumin:creatinine ratio of 17 to 299 for males and 25 to 299 for females. A ratio of albumin:creatinine of 300 or higher is indicative of overt proteinuria.  Due to biologic variability, positive results should be confirmed by a second, first-morning random or 24-hour timed urine specimen. If there is discrepancy, a third specimen is recommended. When 2 out of 3 results are in the microalbuminuria range, this is evidence for incipient nephropathy and warrants increased efforts at glucose control, blood pressure control, and institution of therapy with an angiotensin-converting-enzyme (ACE) inhibitor (if the patient can tolerate it).     HEMOGLOBIN A1C   Result Value Ref Range    Hemoglobin A1C 7.4 (H) 0.0 - 5.6 %      Comment:      Normal <5.7%   Prediabetes 5.7-6.4%    Diabetes 6.5% or higher     Note: Adopted from ADA consensus guidelines.       If you have any questions or concerns, please call the clinic at the number listed above.       Sincerely,      Margaret Chandra MD
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
## 2024-04-19 LAB
ALBUMIN SERPL BCG-MCNC: 3.9 G/DL (ref 3.5–5.2)
ALP SERPL-CCNC: 74 U/L (ref 40–150)
ALT SERPL W P-5'-P-CCNC: 16 U/L (ref 0–50)
ANION GAP SERPL CALCULATED.3IONS-SCNC: 10 MMOL/L (ref 7–15)
AST SERPL W P-5'-P-CCNC: 18 U/L (ref 0–45)
BILIRUB SERPL-MCNC: 0.6 MG/DL
BUN SERPL-MCNC: 17.2 MG/DL (ref 8–23)
CALCIUM SERPL-MCNC: 9.5 MG/DL (ref 8.8–10.2)
CHLORIDE SERPL-SCNC: 102 MMOL/L (ref 98–107)
CHOLEST SERPL-MCNC: 125 MG/DL
CREAT SERPL-MCNC: 1.18 MG/DL (ref 0.51–0.95)
CREAT UR-MCNC: 62.2 MG/DL
DEPRECATED HCO3 PLAS-SCNC: 25 MMOL/L (ref 22–29)
EGFRCR SERPLBLD CKD-EPI 2021: 46 ML/MIN/1.73M2
FASTING STATUS PATIENT QL REPORTED: ABNORMAL
GLUCOSE SERPL-MCNC: 142 MG/DL (ref 70–99)
HDLC SERPL-MCNC: 47 MG/DL
LDLC SERPL CALC-MCNC: 63 MG/DL
MICROALBUMIN UR-MCNC: 51.6 MG/L
MICROALBUMIN/CREAT UR: 82.96 MG/G CR (ref 0–25)
NONHDLC SERPL-MCNC: 78 MG/DL
POTASSIUM SERPL-SCNC: 4.8 MMOL/L (ref 3.4–5.3)
PROT SERPL-MCNC: 6.9 G/DL (ref 6.4–8.3)
SODIUM SERPL-SCNC: 137 MMOL/L (ref 135–145)
TRIGL SERPL-MCNC: 77 MG/DL
TSH SERPL DL<=0.005 MIU/L-ACNC: 0.74 UIU/ML (ref 0.3–4.2)

## 2024-04-25 ENCOUNTER — TELEPHONE (OUTPATIENT)
Dept: FAMILY MEDICINE | Facility: CLINIC | Age: 79
End: 2024-04-25
Payer: COMMERCIAL

## 2024-04-25 NOTE — TELEPHONE ENCOUNTER
Forms received from: [facility]   Phone number listed: [phone]   Fax listed: [phone]  Date received: 4/19/24  Form description: Physician order ID # [id number]  Once forms are completed, please return to [facility] via fax.  Is patient requesting to be contacted when forms are completed: na  Phone: na  Form placed:  Dr. Prateek Duffy

## 2024-04-29 ENCOUNTER — MEDICAL CORRESPONDENCE (OUTPATIENT)
Dept: HEALTH INFORMATION MANAGEMENT | Facility: CLINIC | Age: 79
End: 2024-04-29
Payer: COMMERCIAL

## 2024-05-01 ENCOUNTER — DOCUMENTATION ONLY (OUTPATIENT)
Dept: OTHER | Facility: CLINIC | Age: 79
End: 2024-05-01
Payer: COMMERCIAL

## 2024-05-20 ENCOUNTER — OFFICE VISIT (OUTPATIENT)
Dept: PODIATRY | Facility: CLINIC | Age: 79
End: 2024-05-20
Attending: FAMILY MEDICINE
Payer: COMMERCIAL

## 2024-05-20 DIAGNOSIS — E11.69 ONYCHOMYCOSIS OF MULTIPLE TOENAILS WITH TYPE 2 DIABETES MELLITUS (H): ICD-10-CM

## 2024-05-20 DIAGNOSIS — N18.30 STAGE 3 CHRONIC KIDNEY DISEASE, UNSPECIFIED WHETHER STAGE 3A OR 3B CKD (H): ICD-10-CM

## 2024-05-20 DIAGNOSIS — E11.51 DIABETES MELLITUS WITH PERIPHERAL VASCULAR DISEASE (H): Primary | ICD-10-CM

## 2024-05-20 DIAGNOSIS — B35.1 ONYCHOMYCOSIS OF MULTIPLE TOENAILS WITH TYPE 2 DIABETES MELLITUS (H): ICD-10-CM

## 2024-05-20 DIAGNOSIS — M21.969 TYPE 2 DIABETES MELLITUS WITH DIABETIC FOOT DEFORMITY (H): ICD-10-CM

## 2024-05-20 DIAGNOSIS — B35.3 TINEA PEDIS OF BOTH FEET: ICD-10-CM

## 2024-05-20 DIAGNOSIS — E11.69 TYPE 2 DIABETES MELLITUS WITH DIABETIC FOOT DEFORMITY (H): ICD-10-CM

## 2024-05-20 PROCEDURE — 11721 DEBRIDE NAIL 6 OR MORE: CPT | Mod: Q8 | Performed by: PODIATRIST

## 2024-05-20 PROCEDURE — 99204 OFFICE O/P NEW MOD 45 MIN: CPT | Mod: 25 | Performed by: PODIATRIST

## 2024-05-20 RX ORDER — CICLOPIROX OLAMINE 7.7 MG/G
CREAM TOPICAL 2 TIMES DAILY
Qty: 90 G | Refills: 6 | Status: SHIPPED | OUTPATIENT
Start: 2024-05-20

## 2024-05-20 NOTE — NURSING NOTE
Anamaria Parra's chief complaint for this visit includes:  Chief Complaint   Patient presents with    Consult     Bilateral nail trim     PCP: Margaret Chandra    Referring Provider:  Margaret Chandra MD  [address] [facility] [place] [address]    There were no vitals taken for this visit.  Data Unavailable     Do you need any medication refills at today's visit? NO    Allergies   Allergen Reactions    Dilantin [Phenytoin]      rash       Yara Mckinney LPN

## 2024-05-20 NOTE — LETTER
5/20/2024         RE: Anamaria Parra  [address] [place] [address]        Dear Colleague,    Thank you for referring your patient, Anamaria Parra, to the [facility]. Please see a copy of my visit note below.    Past Medical History:   Diagnosis Date     ASHD (arteriosclerotic heart disease)      Carotid stenosis     mild     Diabetes mellitus      ETOHism (H)     h/o sober 05/13/97     Menopause 98     Onychomycoses      Ovarian cyst 6/2014    right     Overweight, obesity and other hyperalimentation      PAD (peripheral artery disease) (H24)      Psoriasis      Rotator cuff tear, right 2/11/2012     Smoker     quit 90     Spasmodic torticollis      Unspecified essential hypertension      Vitamin D deficiencies      Patient Active Problem List   Diagnosis     Onychomycosis due to dermatophyte     Psoriasis     PAD (peripheral artery disease) (H24)     Carotid stenosis     Spasmodic torticollis     CKD (chronic kidney disease) stage 3, GFR 30-59 ml/min (H)     Hyperthyroidism     Hyperlipidemia LDL goal <100     Peripheral neuropathy     Partial tear of rotator cuff     AC (acromioclavicular) joint arthritis - right     Shoulder impingement - right     Osteoarthritis of shoulder - right     Ovarian cyst     Morbid obesity due to excess calories (H)     Chronic pain syndrome     Hypertension goal BP (blood pressure) < 140/90     Coronary artery disease involving autologous artery coronary bypass graft without angina pectoris     Type 2 diabetes mellitus with diabetic nephropathy, without long-term current use of insulin (H)     Gastroesophageal reflux disease without esophagitis     S/P coronary artery stent placement     CKD (chronic kidney disease) stage 4, GFR 15-29 ml/min (H)     Alcohol dependence in remission (H)     Secondary renal hyperparathyroidism (H24)     Past Surgical History:   Procedure Laterality Date     ANGIOGRAM  02    with 2 stents     ANGIOGRAM  04      ANGIOGRAM  2017, 2 stents in RCA    at [facility]      CLOSED RX CLAVICLE FRACTURE       CORONARY ARTERY BYPASS  09     TONSILLECTOMY & ADENOIDECTOMY       [facility] ANESTH,SURGERY OF SHOULDER  07    left shoulder arthroplasty     [facility] HAND/FINGER SURGERY UNLISTED      right thumb deep lac repair     Social History     Socioeconomic History     Marital status:      Spouse name: Not on file     Number of children: Not on file     Years of education: Not on file     Highest education level: Not on file   Occupational History     Not on file   Tobacco Use     Smoking status: Former     Current packs/day: 0.00     Types: Cigarettes     Start date: 1960     Quit date: 1990     Years since quittin.4     Passive exposure: Never     Smokeless tobacco: Never   Vaping Use     Vaping status: Never Used   Substance and Sexual Activity     Alcohol use: No     Alcohol/week: 0.0 standard drinks of alcohol     Comment: history of abuse      Drug use: No     Sexual activity: Not Currently   Other Topics Concern     Parent/sibling w/ CABG, MI or angioplasty before 65F 55M? Not Asked   Social History Narrative     Not on file     Social Determinants of Health     Financial Resource Strain: Low Risk  (2024)    Received from [facility], [facility]    Financial Resource Strain      Difficulty of Paying Living Expenses: 3      Difficulty of Paying Living Expenses: Not on file   Food Insecurity: No Food Insecurity (2024)    [facility], [facility]    Food Insecurity      Worried About Running Out of Food in the Last Year: 1   Transportation Needs: No Transportation Needs (2024)    Received from [facility], [facility]    Transportation Needs      Lack of Transportation (Medical):  1   Physical Activity: Not on file   Stress: Not on file   Social Connections: Socially Integrated (2/13/2024)    Received from [facility], [facility]    Social Connections      Frequency of Communication with Friends and Family: 0   Interpersonal Safety: Low Risk  (10/26/2023)    Interpersonal Safety      Do you feel physically and emotionally safe where you currently live?: Yes      Within the past 12 months, have you been hit, slapped, kicked or otherwise physically hurt by someone?: No      Within the past 12 months, have you been humiliated or emotionally abused in other ways by your partner or ex-partner?: No   Housing Stability: Low Risk  (2/13/2024)    Received from [facility], [facility]    Housing Stability      Unable to Pay for Housing in the Last Year: 1     Family History   Problem Relation Age of Onset     Hypertension Mother      Cerebrovascular Disease Mother      Cardiovascular Father      C.A.D. Brother      Diabetes Brother      Hypertension Brother      Cardiovascular Brother        Lab Results   Component Value Date    A1C 7.4 04/17/2024    A1C 7.8 05/19/2023    A1C 8.2 12/09/2022    A1C 9.0 05/04/2022    A1C 7.1 12/03/2021    A1C 7.4 11/13/2020    A1C 7.4 07/22/2020    A1C 7.5 02/20/2020    A1C 7.4 10/03/2019    A1C 7.4 07/02/2019   Last Comprehensive Metabolic Panel:  Lab Results   Component Value Date     04/17/2024    POTASSIUM 4.8 04/17/2024    CHLORIDE 102 04/17/2024    CO2 25 04/17/2024    ANIONGAP 10 04/17/2024     (H) 04/17/2024    BUN 17.2 04/17/2024    CR 1.18 (H) 04/17/2024    GFRESTIMATED 46 (L) 04/17/2024    MIKKI 9.5 04/17/2024       Lab Results   Component Value Date    AST 18 04/17/2024    AST 14 07/22/2020     Lab Results   Component Value Date    ALT 16 04/17/2024    ALT 27 07/22/2020     Lab Results   Component Value Date     "BILICONJ 0.0 03/01/2012      Lab Results   Component Value Date    BILITOTAL 0.6 04/17/2024    BILITOTAL 0.5 07/22/2020     Lab Results   Component Value Date    ALBUMIN 3.9 04/17/2024    ALBUMIN 3.6 12/09/2022    ALBUMIN 3.8 07/22/2020     Lab Results   Component Value Date    PROTTOTAL 6.9 04/17/2024    PROTTOTAL 7.2 07/22/2020      Lab Results   Component Value Date    ALKPHOS 74 04/17/2024    ALKPHOS 70 07/22/2020     Lab Results   Component Value Date    WBC 6.0 03/04/2024    WBC 6.0 08/13/2018     Lab Results   Component Value Date    RBC 4.04 03/04/2024    RBC 4.28 08/13/2018     Lab Results   Component Value Date    HGB 12.4 03/04/2024    HGB 13.1 08/13/2018     Lab Results   Component Value Date    HCT 39.5 03/04/2024    HCT 40.9 08/13/2018     No components found for: \"MCT\"  Lab Results   Component Value Date    MCV 98 03/04/2024    MCV 96 08/13/2018     Lab Results   Component Value Date    MCH 30.7 03/04/2024    MCH 30.6 08/13/2018     Lab Results   Component Value Date    MCHC 31.4 03/04/2024    MCHC 32.0 08/13/2018     Lab Results   Component Value Date    RDW 13.5 03/04/2024    RDW 13.6 08/13/2018     Lab Results   Component Value Date     03/04/2024     08/13/2018           Subjective findings- 78-year-old presents with daughter from [facility] Chandra MD for Diabetic foot cares and long toenails, I reviewed Dr. Lamar 4/17/2024 note.  She relates she has not been able to see, so she has not been able to trim her toenails for 2 years duration, relates they do not hurt, relates she gets tingling in her feet, relates to no history of ulcers or sores or injuries, relates she uses Aquaphor or Aveeno lotion intermittently, relates she has compression socks she usually wears, relates she does not wear diabetic shoes.    Objective findings- DP and PT are 2 out of 4 bilaterally.  Has decreased hair growth bilaterally.  Has peripheral edema with varicosities bilaterally.  Has diffuse " hyperkeratotic tissue buildup across the MPJs and heels bilaterally.  Has dry scaly skin moccasin pattern  bilaterally.  Has dorsally contracted digits 1 through 5 bilaterally.  Has decreased ankle joint dorsiflexion right more so than left.  Has long incurvated dystrophic thickened nails with brittleness subungual debris dystrophy and discoloration 1 through 5 bilaterally to differing degrees, left Hallux nails putting pressure on the left second toenail, has ecchymosis and hyperkeratotic tissue buildup on the distal left second toe.  There is no erythema, no odor, no calor, no drainage, no pain on palpation, no tendon voids bilaterally.  Sharp dull is intact with 5.07 [place] Keely monofilament bilaterally, deep tendon reflexes are intact bilaterally, proprioception is intact bilaterally.    Assessment and plan- Onychomycosis, Diabetes with peripheral Vascular disease and Kidney disease, Hammertoes and Equinus.  Diagnosis and treatment options discussed with them.  Patient is advised on stretching.  Prescription for Diabetic shoes and inserts given and they are given the phone number address to orthotics and prosthetics to get these.  All the toenails were debrided 1 through 5 bilaterally upon consent, 6+ toenails were debrided.  Prescription for Loprox cream given use discussed with them.  Previous notes reviewed.  Return to clinic and see me in 2 months.                              Moderate level of medical decision making.      Again, thank you for allowing me to participate in the care of your patient.        Sincerely,        Abbe Miller DPM

## 2024-05-20 NOTE — PROGRESS NOTES
Past Medical History:   Diagnosis Date    ASHD (arteriosclerotic heart disease)     Carotid stenosis     mild    Diabetes mellitus     ETOHism (H)     h/o sober 05/13/97    Menopause 98    Onychomycoses     Ovarian cyst 6/2014    right    Overweight, obesity and other hyperalimentation     PAD (peripheral artery disease) (H24)     Psoriasis     Rotator cuff tear, right 2/11/2012    Smoker     quit 90    Spasmodic torticollis     Unspecified essential hypertension     Vitamin D deficiencies      Patient Active Problem List   Diagnosis    Onychomycosis due to dermatophyte    Psoriasis    PAD (peripheral artery disease) (H24)    Carotid stenosis    Spasmodic torticollis    CKD (chronic kidney disease) stage 3, GFR 30-59 ml/min (H)    Hyperthyroidism    Hyperlipidemia LDL goal <100    Peripheral neuropathy    Partial tear of rotator cuff    AC (acromioclavicular) joint arthritis - right    Shoulder impingement - right    Osteoarthritis of shoulder - right    Ovarian cyst    Morbid obesity due to excess calories (H)    Chronic pain syndrome    Hypertension goal BP (blood pressure) < 140/90    Coronary artery disease involving autologous artery coronary bypass graft without angina pectoris    Type 2 diabetes mellitus with diabetic nephropathy, without long-term current use of insulin (H)    Gastroesophageal reflux disease without esophagitis    S/P coronary artery stent placement    CKD (chronic kidney disease) stage 4, GFR 15-29 ml/min (H)    Alcohol dependence in remission (H)    Secondary renal hyperparathyroidism (H24)     Past Surgical History:   Procedure Laterality Date    ANGIOGRAM  02    with 2 stents    ANGIOGRAM  04    ANGIOGRAM  12/2017, 2 stents in RCA    at [facility]     CLOSED RX CLAVICLE FRACTURE  08/07    CORONARY ARTERY BYPASS  01/14/09    TONSILLECTOMY & ADENOIDECTOMY      [facility] ANESTH,SURGERY OF SHOULDER  02/26/07    left shoulder arthroplasty    [facility] HAND/FINGER SURGERY UNLISTED      right thumb deep lac  repair     Social History     Socioeconomic History    Marital status:      Spouse name: Not on file    Number of children: Not on file    Years of education: Not on file    Highest education level: Not on file   Occupational History    Not on file   Tobacco Use    Smoking status: Former     Current packs/day: 0.00     Types: Cigarettes     Start date: 1960     Quit date: 1990     Years since quittin.4     Passive exposure: Never    Smokeless tobacco: Never   Vaping Use    Vaping status: Never Used   Substance and Sexual Activity    Alcohol use: No     Alcohol/week: 0.0 standard drinks of alcohol     Comment: history of abuse     Drug use: No    Sexual activity: Not Currently   Other Topics Concern    Parent/sibling w/ CABG, MI or angioplasty before 65F 55M? Not Asked   Social History Narrative    Not on file     Social Determinants of Health     Financial Resource Strain: Low Risk  (2024)    Received from [facility], [facility]    Financial Resource Strain     Difficulty of Paying Living Expenses: 3     Difficulty of Paying Living Expenses: Not on file   Food Insecurity: No Food Insecurity (2024)    Received from [facility] [facility], [facility]    Food Insecurity     Worried About Running Out of Food in the Last Year: 1   Transportation Needs: No Transportation Needs (2024)    Received from [facility] [facility], [facility]    Transportation Needs     Lack of Transportation (Medical): 1   Physical Activity: Not on file   Stress: Not on file   Social Connections: Socially Integrated (2024)    Received from [facility] [facility], [facility]    Social Connections     Frequency of Communication with Friends and Family: 0   Interpersonal  Safety: Low Risk  (10/26/2023)    Interpersonal Safety     Do you feel physically and emotionally safe where you currently live?: Yes     Within the past 12 months, have you been hit, slapped, kicked or otherwise physically hurt by someone?: No     Within the past 12 months, have you been humiliated or emotionally abused in other ways by your partner or ex-partner?: No   Housing Stability: Low Risk  (2/13/2024)    [facility]    Housing Stability     Unable to Pay for Housing in the Last Year: 1     Family History   Problem Relation Age of Onset    Hypertension Mother     Cerebrovascular Disease Mother     Cardiovascular Father     C.A.D. Brother     Diabetes Brother     Hypertension Brother     Cardiovascular Brother        Lab Results   Component Value Date    A1C 7.4 04/17/2024    A1C 7.8 05/19/2023    A1C 8.2 12/09/2022    A1C 9.0 05/04/2022    A1C 7.1 12/03/2021    A1C 7.4 11/13/2020    A1C 7.4 07/22/2020    A1C 7.5 02/20/2020    A1C 7.4 10/03/2019    A1C 7.4 07/02/2019   Last Comprehensive Metabolic Panel:  Lab Results   Component Value Date     04/17/2024    POTASSIUM 4.8 04/17/2024    CHLORIDE 102 04/17/2024    CO2 25 04/17/2024    ANIONGAP 10 04/17/2024     (H) 04/17/2024    BUN 17.2 04/17/2024    CR 1.18 (H) 04/17/2024    GFRESTIMATED 46 (L) 04/17/2024    MIKKI 9.5 04/17/2024       Lab Results   Component Value Date    AST 18 04/17/2024    AST 14 07/22/2020     Lab Results   Component Value Date    ALT 16 04/17/2024    ALT 27 07/22/2020     Lab Results   Component Value Date    BILICONJ 0.0 03/01/2012      Lab Results   Component Value Date    BILITOTAL 0.6 04/17/2024    BILITOTAL 0.5 07/22/2020     Lab Results   Component Value Date    ALBUMIN 3.9 04/17/2024    ALBUMIN 3.6 12/09/2022    ALBUMIN 3.8 07/22/2020     Lab Results   Component Value Date    PROTTOTAL 6.9 04/17/2024    PROTTOTAL 7.2 07/22/2020  "     Lab Results   Component Value Date    ALKPHOS 74 04/17/2024    ALKPHOS 70 07/22/2020     Lab Results   Component Value Date    WBC 6.0 03/04/2024    WBC 6.0 08/13/2018     Lab Results   Component Value Date    RBC 4.04 03/04/2024    RBC 4.28 08/13/2018     Lab Results   Component Value Date    HGB 12.4 03/04/2024    HGB 13.1 08/13/2018     Lab Results   Component Value Date    HCT 39.5 03/04/2024    HCT 40.9 08/13/2018     No components found for: \"MCT\"  Lab Results   Component Value Date    MCV 98 03/04/2024    MCV 96 08/13/2018     Lab Results   Component Value Date    MCH 30.7 03/04/2024    MCH 30.6 08/13/2018     Lab Results   Component Value Date    MCHC 31.4 03/04/2024    MCHC 32.0 08/13/2018     Lab Results   Component Value Date    RDW 13.5 03/04/2024    RDW 13.6 08/13/2018     Lab Results   Component Value Date     03/04/2024     08/13/2018           Subjective findings- 78-year-old presents with daughter from Margaret Chadnra MD for Diabetic foot cares and long toenails, I reviewed Dr. Lamar 4/17/2024 note.  She relates she has not been able to see, so she has not been able to trim her toenails for 2 years duration, relates they do not hurt, relates she gets tingling in her feet, relates to no history of ulcers or sores or injuries, relates she uses Aquaphor or Aveeno lotion intermittently, relates she has compression socks she usually wears, relates she does not wear diabetic shoes.    Objective findings- DP and PT are 2 out of 4 bilaterally.  Has decreased hair growth bilaterally.  Has peripheral edema with varicosities bilaterally.  Has diffuse hyperkeratotic tissue buildup across the MPJs and heels bilaterally.  Has dry scaly skin moccasin pattern  bilaterally.  Has dorsally contracted digits 1 through 5 bilaterally.  Has decreased ankle joint dorsiflexion right more so than left.  Has long incurvated dystrophic thickened nails with brittleness subungual debris dystrophy and " discoloration 1 through 5 bilaterally to differing degrees, left Hallux nails putting pressure on the left second toenail, has ecchymosis and hyperkeratotic tissue buildup on the distal left second toe.  There is no erythema, no odor, no calor, no drainage, no pain on palpation, no tendon voids bilaterally.  Sharp dull is intact with 5.07 [place] Keely monofilament bilaterally, deep tendon reflexes are intact bilaterally, proprioception is intact bilaterally.    Assessment and plan- Onychomycosis, Diabetes with peripheral Vascular disease and Kidney disease, Hammertoes and Equinus.  Diagnosis and treatment options discussed with them.  Patient is advised on stretching.  Prescription for Diabetic shoes and inserts given and they are given the phone number address to orthotics and prosthetics to get these.  All the toenails were debrided 1 through 5 bilaterally upon consent, 6+ toenails were debrided.  Prescription for Loprox cream given use discussed with them.  Previous notes reviewed.  Return to clinic and see me in 2 months.                              Moderate level of medical decision making.

## 2024-06-05 DIAGNOSIS — I10 HYPERTENSION GOAL BP (BLOOD PRESSURE) < 140/90: ICD-10-CM

## 2024-06-05 DIAGNOSIS — K21.9 GASTROESOPHAGEAL REFLUX DISEASE WITHOUT ESOPHAGITIS: ICD-10-CM

## 2024-06-05 DIAGNOSIS — G89.4 CHRONIC PAIN SYNDROME: ICD-10-CM

## 2024-06-05 DIAGNOSIS — E11.65 TYPE 2 DIABETES MELLITUS WITH HYPERGLYCEMIA, WITHOUT LONG-TERM CURRENT USE OF INSULIN (H): ICD-10-CM

## 2024-06-05 RX ORDER — GLIPIZIDE 10 MG/1
10 TABLET, FILM COATED, EXTENDED RELEASE ORAL DAILY
Qty: 90 TABLET | Refills: 3 | Status: SHIPPED | OUTPATIENT
Start: 2024-06-05

## 2024-06-07 DIAGNOSIS — I10 HYPERTENSION GOAL BP (BLOOD PRESSURE) < 140/90: ICD-10-CM

## 2024-06-07 DIAGNOSIS — K21.9 GASTROESOPHAGEAL REFLUX DISEASE WITHOUT ESOPHAGITIS: ICD-10-CM

## 2024-06-07 RX ORDER — OMEPRAZOLE 40 MG/1
CAPSULE, DELAYED RELEASE ORAL
Qty: 90 CAPSULE | Refills: 3 | OUTPATIENT
Start: 2024-06-07

## 2024-06-07 RX ORDER — METOPROLOL SUCCINATE 50 MG/1
TABLET, EXTENDED RELEASE ORAL
Qty: 135 TABLET | Refills: 1 | OUTPATIENT
Start: 2024-06-07

## 2024-06-07 RX ORDER — METOPROLOL SUCCINATE 50 MG/1
TABLET, EXTENDED RELEASE ORAL
Qty: 135 TABLET | Refills: 1 | Status: SHIPPED | OUTPATIENT
Start: 2024-06-07

## 2024-06-07 RX ORDER — OMEPRAZOLE 40 MG/1
CAPSULE, DELAYED RELEASE ORAL
Qty: 90 CAPSULE | Refills: 3 | Status: SHIPPED | OUTPATIENT
Start: 2024-06-07

## 2024-06-07 NOTE — TELEPHONE ENCOUNTER
Patient checking on status of refill request  Refills sent for metoprolol and omeprazole.  Will forward tramadol request to gerardo Medina RN  [facility]

## 2024-06-08 RX ORDER — TRAMADOL HYDROCHLORIDE 50 MG/1
TABLET ORAL
Qty: 180 TABLET | Refills: 0 | Status: SHIPPED | OUTPATIENT
Start: 2024-06-08 | End: 2024-07-06

## 2024-07-05 DIAGNOSIS — G89.4 CHRONIC PAIN SYNDROME: ICD-10-CM

## 2024-07-06 RX ORDER — TRAMADOL HYDROCHLORIDE 50 MG/1
100 TABLET ORAL EVERY 8 HOURS PRN
Qty: 180 TABLET | Refills: 0 | Status: SHIPPED | OUTPATIENT
Start: 2024-07-06 | End: 2024-08-16

## 2024-07-15 ENCOUNTER — OFFICE VISIT (OUTPATIENT)
Dept: FAMILY MEDICINE | Facility: CLINIC | Age: 79
End: 2024-07-15
Payer: COMMERCIAL

## 2024-07-15 VITALS
DIASTOLIC BLOOD PRESSURE: 60 MMHG | OXYGEN SATURATION: 97 % | RESPIRATION RATE: 15 BRPM | WEIGHT: 182.6 LBS | BODY MASS INDEX: 30.42 KG/M2 | TEMPERATURE: 97.1 F | HEART RATE: 97 BPM | SYSTOLIC BLOOD PRESSURE: 134 MMHG | HEIGHT: 65 IN

## 2024-07-15 DIAGNOSIS — E11.65 TYPE 2 DIABETES MELLITUS WITH HYPERGLYCEMIA, WITHOUT LONG-TERM CURRENT USE OF INSULIN (H): ICD-10-CM

## 2024-07-15 DIAGNOSIS — H33.311 RETINAL TEAR, RIGHT: ICD-10-CM

## 2024-07-15 DIAGNOSIS — Z01.818 PREOP GENERAL PHYSICAL EXAM: Primary | ICD-10-CM

## 2024-07-15 LAB
ANION GAP SERPL CALCULATED.3IONS-SCNC: 8 MMOL/L (ref 7–15)
BUN SERPL-MCNC: 12.5 MG/DL (ref 8–23)
CALCIUM SERPL-MCNC: 9.3 MG/DL (ref 8.8–10.2)
CHLORIDE SERPL-SCNC: 103 MMOL/L (ref 98–107)
CREAT SERPL-MCNC: 1.09 MG/DL (ref 0.51–0.95)
CREAT UR-MCNC: 50.3 MG/DL
EGFRCR SERPLBLD CKD-EPI 2021: 51 ML/MIN/1.73M2
GLUCOSE SERPL-MCNC: 163 MG/DL (ref 70–99)
HBA1C MFR BLD: 7.2 % (ref 0–5.6)
HCO3 SERPL-SCNC: 28 MMOL/L (ref 22–29)
HGB BLD-MCNC: 13.2 G/DL (ref 11.7–15.7)
MICROALBUMIN UR-MCNC: 31 MG/L
MICROALBUMIN/CREAT UR: 61.63 MG/G CR (ref 0–25)
POTASSIUM SERPL-SCNC: 4.7 MMOL/L (ref 3.4–5.3)
SODIUM SERPL-SCNC: 139 MMOL/L (ref 135–145)

## 2024-07-15 PROCEDURE — 99214 OFFICE O/P EST MOD 30 MIN: CPT | Performed by: FAMILY MEDICINE

## 2024-07-15 PROCEDURE — 82043 UR ALBUMIN QUANTITATIVE: CPT | Performed by: FAMILY MEDICINE

## 2024-07-15 PROCEDURE — 80048 BASIC METABOLIC PNL TOTAL CA: CPT | Performed by: FAMILY MEDICINE

## 2024-07-15 PROCEDURE — 85018 HEMOGLOBIN: CPT | Performed by: FAMILY MEDICINE

## 2024-07-15 PROCEDURE — 82570 ASSAY OF URINE CREATININE: CPT | Performed by: FAMILY MEDICINE

## 2024-07-15 PROCEDURE — 83036 HEMOGLOBIN GLYCOSYLATED A1C: CPT | Performed by: FAMILY MEDICINE

## 2024-07-15 PROCEDURE — 36415 COLL VENOUS BLD VENIPUNCTURE: CPT | Performed by: FAMILY MEDICINE

## 2024-07-15 ASSESSMENT — PAIN SCALES - GENERAL: PAINLEVEL: NO PAIN (0)

## 2024-07-15 NOTE — PATIENT INSTRUCTIONS
How to Take Your Medication Before Surgery  Preoperative Medication Instructions   Antiplatelet or Anticoagulation Medication Instructions   - aspirin: Bleeding risk is low for this procedure and daily aspirin may be continued without modification.     Additional Medication Instructions  Take all scheduled medications on the day of surgery EXCEPT for modifications listed below:   - sulfonylurea (e.g. glyburide, glipizide): DO NOT TAKE day of surgery       Patient Education   Preparing for Your Surgery  Getting started  A nurse will call you to review your health history and instructions. They will give you an arrival time based on your scheduled surgery time. Please be ready to share:  Your doctor's clinic name and phone number  Your medical, surgical, and anesthesia history  A list of allergies and sensitivities  A list of medicines, including herbal treatments and over-the-counter drugs  Whether the patient has a legal guardian (ask how to send us the papers in advance)  Please tell us if you're pregnant--or if there's any chance you might be pregnant. Some surgeries may injure a fetus (unborn baby), so they require a pregnancy test. Surgeries that are safe for a fetus don't always need a test, and you can choose whether to have one.   If you have a child who's having surgery, please ask for a copy of Preparing for Your Child's Surgery.    Preparing for surgery  Within 10 to 30 days of surgery: Have a pre-op exam (sometimes called an H&P, or History and Physical). This can be done at a clinic or pre-operative center.  If you're having a , you may not need this exam. Talk to your care team.  At your pre-op exam, talk to your care team about all medicines you take. If you need to stop any medicines before surgery, ask when to start taking them again.  We do this for your safety. Many medicines can make you bleed too much during surgery. Some change how well surgery (anesthesia) drugs work.  Call your  insurance company to let them know you're having surgery. (If you don't have insurance, call [phone].)  Call your clinic if there's any change in your health. This includes signs of a cold or flu (sore throat, runny nose, cough, rash, fever). It also includes a scrape or scratch near the surgery site.  If you have questions on the day of surgery, call your hospital or surgery center.  Eating and drinking guidelines  For your safety: Unless your surgeon tells you otherwise, follow the guidelines below.  Eat and drink as usual until 8 hours before you arrive for surgery. After that, no food or milk.  Drink clear liquids until 2 hours before you arrive. These are liquids you can see through, like water, Gatorade, and Propel Water. They also include plain black coffee and tea (no cream or milk), candy, and breath mints. You can spit out gum when you arrive.  If you drink alcohol: Stop drinking it the night before surgery.  If your care team tells you to take medicine on the morning of surgery, it's okay to take it with a sip of water.  Preventing infection  Shower or bathe the night before and morning of your surgery. Follow the instructions your clinic gave you. (If no instructions, use regular soap.)  Don't shave or clip hair near your surgery site. We'll remove the hair if needed.  Don't smoke or vape the morning of surgery. You may chew nicotine gum up to 2 hours before surgery. A nicotine patch is okay.  Note: Some surgeries require you to completely quit smoking and nicotine. Check with your surgeon.  Your care team will make every effort to keep you safe from infection. We will:  Clean our hands often with soap and water (or an alcohol-based hand rub).  Clean the skin at your surgery site with a special soap that kills germs.  Give you a special gown to keep you warm. (Cold raises the risk of infection.)  Wear special hair covers, masks, gowns and gloves during surgery.  Give antibiotic medicine, if  prescribed. Not all surgeries need antibiotics.  What to bring on the day of surgery  Photo ID and insurance card  Copy of your health care directive, if you have one  Glasses and hearing aids (bring cases)  You can't wear contacts during surgery  Inhaler and eye drops, if you use them (tell us about these when you arrive)  CPAP machine or breathing device, if you use them  A few personal items, if spending the night  If you have . . .  A pacemaker, ICD (cardiac defibrillator) or other implant: Bring the ID card.  An implanted stimulator: Bring the remote control.  A legal guardian: Bring a copy of the certified (court-stamped) guardianship papers.  Please remove any jewelry, including body piercings. Leave jewelry and other valuables at home.  If you're going home the day of surgery  You must have a responsible adult drive you home. They should stay with you overnight as well.  If you don't have someone to stay with you, and you aren't safe to go home alone, we may keep you overnight. Insurance often won't pay for this.  After surgery  If it's hard to control your pain or you need more pain medicine, please call your surgeon's office.  Questions?   If you have any questions for your care team, list them here: _________________________________________________________________________________________________________________________________________________________________________ ____________________________________ ____________________________________ ____________________________________  For informational purposes only. Not to replace the advice of your health care provider. Copyright   2003, 2019 [facility]. All rights reserved. Clinically reviewed by Hanna Courtney MD. SMART[employer] 409463 - REV 12/22.

## 2024-07-15 NOTE — PROGRESS NOTES
Preoperative Evaluation  [facility] [place]  [address]  [place] [address]  Phone: [phone]  Primary Provider: Margaret Chandra MD  Pre-op Performing Provider: Margaret Chandra MD  Jul 15, 2024           7/15/2024   Surgical Information   What procedure is being done? Retinal detachment   Facility or Hospital where procedure/surgery will be performed: [facility]   Who is doing the procedure / surgery? Dr Eduard Rai   Date of surgery / procedure: 8/2/24   Time of surgery / procedure: 1:00 pm   Where do you plan to recover after surgery? at home with family      Fax number for surgical facility: [phone]    Assessment & Plan     Zahida was seen today for pre-op exam.    Diagnoses and all orders for this visit:    Preop general physical exam    Retinal tear, right    Type 2 diabetes mellitus with hyperglycemia, without long-term current use of insulin (H), well controlled. A1c 7.2 today  -     Albumin Random Urine Quantitative with Creat Ratio  -     Basic metabolic panel  (Ca, Cl, CO2, Creat, Gluc, K, Na, BUN)  -     Hemoglobin  -     Hemoglobin A1c  -     Hold Glipizide the morning of surgery.          - No identified additional risk factors other than previously addressed    Preoperative Medication Instructions  Antiplatelet or Anticoagulation Medication Instructions   - Bleeding risk is low for this procedure (e.g. dental, skin, cataract).    Additional Medication Instructions  Take all scheduled medications on the day of surgery EXCEPT for modifications listed below:   - sulfonylurea (e.g. glyburide, glipizide): DO NOT TAKE day of surgery    Recommendation  Approval given to proceed with proposed procedure, without further diagnostic evaluation.    Subjective   Zahida is a 79 year old, presenting for the following:  Pre-Op Exam (Right eye surgery.)          7/15/2024     9:38 AM   Additional Questions   Roomed by Zina/NAMAN ROWE related to  upcoming procedure:     Anamaria is a pleasant 79 year old pleasant female patient of mine here for her Pre-op.   She is currently seeing an Ophthalmologist and is scheduled for repair of retinal detachment of the right eye that she had put off for a while when she was admitted in Short-term Rehab facility after her hospitalization in 2/2024.  Patient states that she understands the risks and benefits of the procedure and wishes to proceed.     Has an underlying history of Type 2 diabetes currently on Glipizide- due for an A1c today.   States that she is otherwise well. No additional concerns.         7/15/2024   Pre-Op Questionnaire   Have you ever had a heart attack or stroke? (!) YES    Have you ever had surgery on your heart or blood vessels, such as a stent placement, a coronary artery bypass, or surgery on an artery in your head, neck, heart, or legs? (!) YES    Do you have chest pain with activity? No   Do you have a history of heart failure? No   Do you currently have a cold, bronchitis or symptoms of other infection? No   Do you have a cough, shortness of breath, or wheezing? No   Do you or anyone in your family have previous history of blood clots? No   Do you or does anyone in your family have a serious bleeding problem such as prolonged bleeding following surgeries or cuts? No   Have you ever had problems with anemia or been told to take iron pills? No   Have you had any abnormal blood loss such as black, tarry or bloody stools, or abnormal vaginal bleeding? No   Have you ever had a blood transfusion? No   Are you willing to have a blood transfusion if it is medically needed before, during, or after your surgery? Yes   Have you or any of your relatives ever had problems with anesthesia? No   Do you have sleep apnea, excessive snoring or daytime drowsiness? No   Do you have any artifical heart valves or other implanted medical devices like a pacemaker, defibrillator, or continuous glucose monitor? No   Do  you have artificial joints? (!) YES   Are you allergic to latex? No      Health Care Directive  Patient has a Health Care Directive on file      Preoperative Review of    reviewed - controlled substances reflected in medication list.      Status of Chronic Conditions:  See problem list for active medical problems.  Problems all longstanding and stable, except as noted/documented.  See ROS for pertinent symptoms related to these conditions.    Patient Active Problem List    Diagnosis Date Noted    CKD (chronic kidney disease) stage 3, GFR 30-59 ml/min (H) 08/02/2010     Priority: High    Secondary renal hyperparathyroidism (H24) 10/26/2023     Priority: Medium    Alcohol dependence in remission (H) 05/04/2022     Priority: Medium    CKD (chronic kidney disease) stage 4, GFR 15-29 ml/min (H) 01/12/2022     Priority: Medium    Gastroesophageal reflux disease without esophagitis 01/04/2018     Priority: Medium    S/P coronary artery stent placement 01/04/2018     Priority: Medium    Type 2 diabetes mellitus with diabetic nephropathy, without long-term current use of insulin (H) 12/20/2016     Priority: Medium    Hypertension goal BP (blood pressure) < 140/90 08/02/2016     Priority: Medium    Coronary artery disease involving autologous artery coronary bypass graft without angina pectoris 08/02/2016     Priority: Medium    Chronic pain syndrome 01/27/2016     Priority: Medium     Patient is followed by Data Unavailable for ongoing prescription of pain medication.  All refills should be approved by this provider, or covering partner.    Medication(s): tramadol 50 mg .   Maximum quantity per month: 180  Clinic visit frequency required: Q 3 months     Controlled substance agreement on file: Yes       Date(s): 2010    Pain Clinic evaluation in the past: No    DIRE Total Score(s):  No flowsheet data found.    Last [facility] website verification:  none; 09/08/2016    [email or web address]        Morbid obesity due to  excess calories (H) 11/06/2015     Priority: Medium    Ovarian cyst 06/01/2014     Priority: Medium     right      Partial tear of rotator cuff 03/08/2013     Priority: Medium     Problem list name updated by automated process. Provider to review      AC (acromioclavicular) joint arthritis - right 03/08/2013     Priority: Medium    Shoulder impingement - right 03/08/2013     Priority: Medium    Osteoarthritis of shoulder - right 03/08/2013     Priority: Medium    Hyperlipidemia LDL goal <100 03/16/2011     Priority: Medium    Peripheral neuropathy 03/16/2011     Priority: Medium    Hyperthyroidism 08/02/2010     Priority: Medium    Onychomycosis due to dermatophyte      Priority: Medium     (Problem list name updated by automated process. Provider to review and confirm.)      Psoriasis      Priority: Medium    PAD (peripheral artery disease) (H24)      Priority: Medium    Carotid stenosis      Priority: Medium     mild      Spasmodic torticollis      Priority: Medium      Past Medical History:   Diagnosis Date    ASHD (arteriosclerotic heart disease)     Carotid stenosis     mild    Diabetes mellitus     ETOHism (H)     h/o sober 05/13/97    Menopause 98    Onychomycoses     Ovarian cyst 6/2014    right    Overweight, obesity and other hyperalimentation     PAD (peripheral artery disease) (H24)     Psoriasis     Rotator cuff tear, right 2/11/2012    Smoker     quit 88    Spasmodic torticollis     Unspecified essential hypertension     Vitamin D deficiencies      Past Surgical History:   Procedure Laterality Date    ANGIOGRAM  02    with 2 stents    ANGIOGRAM  04    ANGIOGRAM  12/2017, 2 stents in RCA    at [facility]     CLOSED RX CLAVICLE FRACTURE  08/07    CORONARY ARTERY BYPASS  01/14/09    TONSILLECTOMY & ADENOIDECTOMY      [facility] ANESTH,SURGERY OF SHOULDER  02/26/07    left shoulder arthroplasty    [facility] HAND/FINGER SURGERY UNLISTED      right thumb deep lac repair     Current Outpatient Medications   Medication Sig  Dispense Refill    aspirin 81 MG tablet Take 81 mg by mouth daily      B-12 OR 1000 MCG DAILY      blood glucose (NO BRAND SPECIFIED) lancets standard Use to test blood sugar 1-2 times daily or as directed. 100 each 3    blood glucose (NO BRAND SPECIFIED) lancets standard Use to test blood sugar 3 times daily or as directed. 100 each 3    blood glucose (ONETOUCH ULTRA) test strip USE TO TEST BLOOD SUGAR TWICE DAILY 100 strip 3    blood glucose monitoring (NO BRAND SPECIFIED) meter device kit Use to test blood sugar 2 times daily or as directed. 1 kit 0    brimonidine (ALPHAGAN-P) 0.15 % ophthalmic solution INSTILL 1 DROP IN LEFT EYE TWICE DAILY      ciclopirox (LOPROX) 0.77 % cream Apply topically 2 times daily To feet and toenails. 90 g 6    dorzolamide (TRUSOPT) 2 % ophthalmic solution       FISH OIL 1000 MG OR CAPS 2 TABLETS DAILY      FOLIC ACID OR 3000 MCG DAILY      glipiZIDE (GLUCOTROL XL) 10 MG 24 hr tablet TAKE 1 TABLET(10 MG) BY MOUTH DAILY 90 tablet 3    latanoprost (XALATAN) 0.005 % ophthalmic solution INSTILL 1 DROP IN LEFT EYE EVERY EVENING      metoprolol succinate ER (TOPROL XL) 50 MG 24 hr tablet TAKE 1 AND 1/2 TABLETS BY MOUTH EVERY  tablet 1    MULTI-VITAMIN OR TABS 1 TABLET DAILY      nitroGLYcerin (NITROSTAT) 0.4 MG sublingual tablet Place 1 tablet (0.4 mg) under the tongue every 5 minutes as needed for chest pain up to 3 tablets per episode. 60 tablet 1    omeprazole (PRILOSEC) 40 MG DR capsule TAKE 1 CAPSULE BY MOUTH EVERY OTHER DAY 90 capsule 3    prednisoLONE acetate (PRED FORTE) 1 % ophthalmic suspension SHAKE LIQUID AND INSTILL 1 DROP IN RIGHT EYE 6 TIMES A DAY      rosuvastatin (CRESTOR) 40 MG tablet TAKE 1 TABLET(40 MG) BY MOUTH DAILY 90 tablet 0    timolol maleate (TIMOPTIC) 0.5 % ophthalmic solution       traMADol (ULTRAM) 50 MG tablet Take 2 tablets (100 mg) by mouth every 8 hours as needed for severe pain MAX OF 6 TABLETS DAILY 180 tablet 0    VITAMIN D 1000 UNIT OR CAPS 1  "CAPSULE DAILY         Allergies   Allergen Reactions    Dilantin [Phenytoin]      rash        Social History     Tobacco Use    Smoking status: Former     Current packs/day: 0.00     Types: Cigarettes     Start date: 1960     Quit date: 1990     Years since quittin.5     Passive exposure: Never    Smokeless tobacco: Never   Substance Use Topics    Alcohol use: No     Alcohol/week: 0.0 standard drinks of alcohol     Comment: history of abuse      Family History   Problem Relation Age of Onset    Hypertension Mother     Cerebrovascular Disease Mother     Cardiovascular Father     C.A.D. Brother     Diabetes Brother     Hypertension Brother     Cardiovascular Brother      History   Drug Use No             Review of Systems  Constitutional, neuro, ENT, endocrine, pulmonary, cardiac, gastrointestinal, genitourinary, musculoskeletal, integument and psychiatric systems are negative, except as otherwise noted.    Objective    /60   Pulse 97   Temp 97.1  F (36.2  C) (Tympanic)   Resp 15   Ht 1.651 m (5' 5\")   Wt 82.8 kg (182 lb 9.6 oz)   SpO2 97%   BMI 30.39 kg/m     Estimated body mass index is 30.39 kg/m  as calculated from the following:    Height as of this encounter: 1.651 m (5' 5\").    Weight as of this encounter: 82.8 kg (182 lb 9.6 oz).  Physical Exam  GENERAL: alert and no distress. Uses a front wheeled walker with a seat.   EYES: Eyes grossly normal to inspection, PERRL and conjunctivae and sclerae normal  HENT: ear canals and TM's normal, nose and mouth without ulcers or lesions  NECK: no adenopathy, no asymmetry, masses, or scars  RESP: lungs clear to auscultation - no rales, rhonchi or wheezes  CV: regular rate and rhythm, normal S1 S2, no S3 or S4, no murmur, click or rub, no peripheral edema  ABDOMEN: soft, nontender, no hepatosplenomegaly, no masses and bowel sounds normal  MS: no gross musculoskeletal defects noted, no edema  SKIN: no suspicious lesions or rashes  NEURO: Normal " strength and tone, mentation intact and speech normal  PSYCH: mentation appears normal, affect normal/bright    Recent Labs   Lab Test 04/17/24  1156 03/08/24  0504 03/04/24  1100 03/01/24  1103   HGB  --   --  12.4 11.2*   PLT  --   --  292 255    140 140 139   POTASSIUM 4.8 4.1 4.1 3.8   CR 1.18* 1.46* 1.36* 1.52*   A1C 7.4*  --   --   --         Diagnostics  Labs pending at this time.  Results will be reviewed when available.   No EKG required for low risk surgery (cataract, skin procedure, breast biopsy, etc).    Revised Cardiac Risk Index (RCRI)  The patient has the following serious cardiovascular risks for perioperative complications:   - No serious cardiac risks = 0 points     RCRI Interpretation: 0 points: Class I (very low risk - 0.4% complication rate)         Signed Electronically by: Margaret Chandra MD  Copy of this evaluation report is provided to requesting physician.

## 2024-07-15 NOTE — LETTER
July 19, 2024      Zahida Parra  [address] [place] [address]        Dear ,    We are writing to inform you of your test results.    Your recent labs looked good.     Hemoglobin is normal. No evidence of anemia at this time.     Urine microalbumin test showed evidence of a blood protein (albumin) in your urine, a complication of diabetes known as microalbuminuria.  It's important that we keep your blood pressure and diabetes under tight control to prevent further kidney damage. Will repeat this urine test in a year.  A1c (diabetes test) is within your goal < 7.5. Continue current diabetes management.     Basic metabolic panel showed normal electrolytes. Kidney function improved compared to 3 months ago. Continue efforts to remain well hydrated daily.    Resulted Orders   Albumin Random Urine Quantitative with Creat Ratio   Result Value Ref Range    Creatinine Urine mg/dL 50.3 mg/dL      Comment:      The reference ranges have not been established in urine creatinine. The results should be integrated into the clinical context for interpretation.    Albumin Urine mg/L 31.0 mg/L      Comment:      The reference ranges have not been established in urine albumin. The results should be integrated into the clinical context for interpretation.    Albumin Urine mg/g Cr 61.63 (H) 0.00 - 25.00 mg/g Cr      Comment:      Microalbuminuria is defined as an albumin:creatinine ratio of 17 to 299 for males and 25 to 299 for females. A ratio of albumin:creatinine of 300 or higher is indicative of overt proteinuria.  Due to biologic variability, positive results should be confirmed by a second, first-morning random or 24-hour timed urine specimen. If there is discrepancy, a third specimen is recommended. When 2 out of 3 results are in the microalbuminuria range, this is evidence for incipient nephropathy and warrants increased efforts at glucose control, blood pressure control, and institution of therapy with an  angiotensin-converting-enzyme (ACE) inhibitor (if the patient can tolerate it).     Basic metabolic panel  (Ca, Cl, CO2, Creat, Gluc, K, Na, BUN)   Result Value Ref Range    Sodium 139 135 - 145 mmol/L    Potassium 4.7 3.4 - 5.3 mmol/L    Chloride 103 98 - 107 mmol/L    Carbon Dioxide (CO2) 28 22 - 29 mmol/L    Anion Gap 8 7 - 15 mmol/L    Urea Nitrogen 12.5 8.0 - 23.0 mg/dL    Creatinine 1.09 (H) 0.51 - 0.95 mg/dL    GFR Estimate 51 (L) >60 mL/min/1.73m2      Comment:      eGFR calculated using 2021 CKD-EPI equation.    Calcium 9.3 8.8 - 10.2 mg/dL    Glucose 163 (H) 70 - 99 mg/dL   Hemoglobin   Result Value Ref Range    Hemoglobin 13.2 11.7 - 15.7 g/dL   Hemoglobin A1c   Result Value Ref Range    Hemoglobin A1C 7.2 (H) 0.0 - 5.6 %      Comment:      Normal <5.7%   Prediabetes 5.7-6.4%    Diabetes 6.5% or higher     Note: Adopted from ADA consensus guidelines.       If you have any questions or concerns, please call the clinic at the number listed above.       Sincerely,      Margaret Chandra MD

## 2024-07-23 ENCOUNTER — OFFICE VISIT (OUTPATIENT)
Dept: PODIATRY | Facility: CLINIC | Age: 79
End: 2024-07-23
Payer: COMMERCIAL

## 2024-07-23 DIAGNOSIS — B35.1 ONYCHOMYCOSIS OF MULTIPLE TOENAILS WITH TYPE 2 DIABETES MELLITUS (H): ICD-10-CM

## 2024-07-23 DIAGNOSIS — N18.30 STAGE 3 CHRONIC KIDNEY DISEASE, UNSPECIFIED WHETHER STAGE 3A OR 3B CKD (H): ICD-10-CM

## 2024-07-23 DIAGNOSIS — E11.628 TYPE 2 DIABETES MELLITUS WITH PRESSURE CALLUS (H): ICD-10-CM

## 2024-07-23 DIAGNOSIS — L84 TYPE 2 DIABETES MELLITUS WITH PRESSURE CALLUS (H): ICD-10-CM

## 2024-07-23 DIAGNOSIS — M21.969 TYPE 2 DIABETES MELLITUS WITH DIABETIC FOOT DEFORMITY (H): ICD-10-CM

## 2024-07-23 DIAGNOSIS — E11.69 ONYCHOMYCOSIS OF MULTIPLE TOENAILS WITH TYPE 2 DIABETES MELLITUS (H): ICD-10-CM

## 2024-07-23 DIAGNOSIS — E11.69 TYPE 2 DIABETES MELLITUS WITH DIABETIC FOOT DEFORMITY (H): ICD-10-CM

## 2024-07-23 DIAGNOSIS — E11.51 DIABETES MELLITUS WITH PERIPHERAL VASCULAR DISEASE (H): Primary | ICD-10-CM

## 2024-07-23 PROCEDURE — 99212 OFFICE O/P EST SF 10 MIN: CPT | Mod: 25 | Performed by: PODIATRIST

## 2024-07-23 PROCEDURE — 11056 PARNG/CUTG B9 HYPRKR LES 2-4: CPT | Mod: Q8 | Performed by: PODIATRIST

## 2024-07-23 NOTE — PROGRESS NOTES
Past Medical History:   Diagnosis Date    ASHD (arteriosclerotic heart disease)     Carotid stenosis     mild    Diabetes mellitus     ETOHism (H)     h/o sober 05/13/97    Menopause 98    Onychomycoses     Ovarian cyst 6/2014    right    Overweight, obesity and other hyperalimentation     PAD (peripheral artery disease) (H24)     Psoriasis     Rotator cuff tear, right 2/11/2012    Smoker     quit 90    Spasmodic torticollis     Unspecified essential hypertension     Vitamin D deficiencies      Patient Active Problem List   Diagnosis    Onychomycosis due to dermatophyte    Psoriasis    PAD (peripheral artery disease) (H24)    Carotid stenosis    Spasmodic torticollis    CKD (chronic kidney disease) stage 3, GFR 30-59 ml/min (H)    Hyperthyroidism    Hyperlipidemia LDL goal <100    Peripheral neuropathy    Partial tear of rotator cuff    AC (acromioclavicular) joint arthritis - right    Shoulder impingement - right    Osteoarthritis of shoulder - right    Ovarian cyst    Morbid obesity due to excess calories (H)    Chronic pain syndrome    Hypertension goal BP (blood pressure) < 140/90    Coronary artery disease involving autologous artery coronary bypass graft without angina pectoris    Type 2 diabetes mellitus with diabetic nephropathy, without long-term current use of insulin (H)    Gastroesophageal reflux disease without esophagitis    S/P coronary artery stent placement    CKD (chronic kidney disease) stage 4, GFR 15-29 ml/min (H)    Alcohol dependence in remission (H)    Secondary renal hyperparathyroidism (H24)     Past Surgical History:   Procedure Laterality Date    ANGIOGRAM  02    with 2 stents    ANGIOGRAM  04    ANGIOGRAM  12/2017, 2 stents in RCA    at [facility]     CLOSED RX CLAVICLE FRACTURE  08/07    CORONARY ARTERY BYPASS  01/14/09    TONSILLECTOMY & ADENOIDECTOMY      [facility] ANESTH,SURGERY OF SHOULDER  02/26/07    left shoulder arthroplasty    [facility] HAND/FINGER SURGERY UNLISTED      right thumb deep lac  repair     Social History     Socioeconomic History    Marital status:      Spouse name: Not on file    Number of children: Not on file    Years of education: Not on file    Highest education level: Not on file   Occupational History    Not on file   Tobacco Use    Smoking status: Former     Current packs/day: 0.00     Types: Cigarettes     Start date: 1960     Quit date: 1990     Years since quittin.5     Passive exposure: Never    Smokeless tobacco: Never   Vaping Use    Vaping status: Never Used   Substance and Sexual Activity    Alcohol use: No     Alcohol/week: 0.0 standard drinks of alcohol     Comment: history of abuse     Drug use: No    Sexual activity: Not Currently   Other Topics Concern    Parent/sibling w/ CABG, MI or angioplasty before 65F 55M? Not Asked   Social History Narrative    Not on file     Social Determinants of Health     Financial Resource Strain: Low Risk  (2024)    Received from [facility] [employer] [facility] [employer] [facility], [facility]    Financial Resource Strain     Difficulty of Paying Living Expenses: 3     Difficulty of Paying Living Expenses: Not on file   Food Insecurity: No Food Insecurity (2024)    Received from [facility] [employer] [facility] [employer] [facility], [facility]    Food Insecurity     Worried About Running Out of Food in the Last Year: 1   Transportation Needs: No Transportation Needs (2024)    Received from [facility] [employer] [facility] [employer] [facility], [facility]    Transportation Needs     Lack of Transportation (Medical): 1   Physical Activity: Not on file   Stress: Not on file   Social Connections: Socially Integrated (2024)    Received from [facility] [employer] [facility] [employer] [facility], [facility]    Social Connections     Frequency of Communication with Friends and Family: 0   Interpersonal  Safety: Low Risk  (10/26/2023)    Interpersonal Safety     Do you feel physically and emotionally safe where you currently live?: Yes     Within the past 12 months, have you been hit, slapped, kicked or otherwise physically hurt by someone?: No     Within the past 12 months, have you been humiliated or emotionally abused in other ways by your partner or ex-partner?: No   Housing Stability: Low Risk  (2/13/2024)    [facility], [facility]    Housing Stability     Unable to Pay for Housing in the Last Year: 1     Family History   Problem Relation Age of Onset    Hypertension Mother     Cerebrovascular Disease Mother     Cardiovascular Father     C.A.D. Brother     Diabetes Brother     Hypertension Brother     Cardiovascular Brother          Lab Results   Component Value Date    A1C 7.2 07/15/2024    A1C 7.4 04/17/2024    A1C 7.8 05/19/2023    A1C 8.2 12/09/2022    A1C 9.0 05/04/2022    A1C 7.4 11/13/2020    A1C 7.4 07/22/2020    A1C 7.5 02/20/2020    A1C 7.4 10/03/2019    A1C 7.4 07/02/2019     Last Comprehensive Metabolic Panel:  Lab Results   Component Value Date     07/15/2024    POTASSIUM 4.7 07/15/2024    CHLORIDE 103 07/15/2024    CO2 28 07/15/2024    ANIONGAP 8 07/15/2024     (H) 07/15/2024    BUN 12.5 07/15/2024    CR 1.09 (H) 07/15/2024    GFRESTIMATED 51 (L) 07/15/2024    MIKKI 9.3 07/15/2024         Subjective findings- 79-year-old returns clinic with daughter for Diabetic foot cares.  Relates to using the Loprox cream with no problems, relates to not getting the diabetic shoes, relates to no ulcers or sores since we seen her last, relates to tingling in her feet, relates to no injuries, relates to wearing compression socks.    Objective findings- DP and PT are 2 out of 4 bilaterally.  Has decreased hair growth bilaterally.  Has peripheral edema with venous stasis bilaterally.  Has hyperkeratotic tissue buildup with  ecchymosis distal second and third toes on the left and distal third toe on the right.  Has decreased dry scaly skin in moccasin pattern bilaterally.  Has dorsally contracted digits 1 through 5 bilaterally.  Has dystrophic incurvated brittle nails with subungual debris dystrophy and discoloration and thickening to differing degrees 1 through 5 bilaterally.  There is no erythema, no odor, no calor, no drainage, no tendon voids, no pain on palpation bilaterally.    Assessment and plan- Onychomycosis, Diabetes with peripheral Vascular disease and Kidney disease, Hammertoes, Tylomas with preulcerative changes distal toes.  Diagnosis and treatment options discussed with them.  All the toenails and calluses were debrided bilaterally upon consent, 6+ toenails were debrided.  The tylomas bilaterally were sharp debrided with a tissue cutter upon consent.  Continue Loprox cream for the onychomycosis and Tinea Pedis.  Advised him to get the diabetic shoes and inserts.  Previous notes reviewed.  Return to clinic and see me in 2 months.                                            Moderate level of medical decision making.

## 2024-07-23 NOTE — LETTER
7/23/2024      Anamaria Parra  [address] [place] [address]      Dear Colleague,    Thank you for referring your patient, Anamaria Parra, to the [facility]. Please see a copy of my visit note below.    Past Medical History:   Diagnosis Date     ASHD (arteriosclerotic heart disease)      Carotid stenosis     mild     Diabetes mellitus      ETOHism (H)     h/o sober 05/13/97     Menopause 98     Onychomycoses      Ovarian cyst 6/2014    right     Overweight, obesity and other hyperalimentation      PAD (peripheral artery disease) (H24)      Psoriasis      Rotator cuff tear, right 2/11/2012     Smoker     quit 90     Spasmodic torticollis      Unspecified essential hypertension      Vitamin D deficiencies      Patient Active Problem List   Diagnosis     Onychomycosis due to dermatophyte     Psoriasis     PAD (peripheral artery disease) (H24)     Carotid stenosis     Spasmodic torticollis     CKD (chronic kidney disease) stage 3, GFR 30-59 ml/min (H)     Hyperthyroidism     Hyperlipidemia LDL goal <100     Peripheral neuropathy     Partial tear of rotator cuff     AC (acromioclavicular) joint arthritis - right     Shoulder impingement - right     Osteoarthritis of shoulder - right     Ovarian cyst     Morbid obesity due to excess calories (H)     Chronic pain syndrome     Hypertension goal BP (blood pressure) < 140/90     Coronary artery disease involving autologous artery coronary bypass graft without angina pectoris     Type 2 diabetes mellitus with diabetic nephropathy, without long-term current use of insulin (H)     Gastroesophageal reflux disease without esophagitis     S/P coronary artery stent placement     CKD (chronic kidney disease) stage 4, GFR 15-29 ml/min (H)     Alcohol dependence in remission (H)     Secondary renal hyperparathyroidism (H24)     Past Surgical History:   Procedure Laterality Date     ANGIOGRAM  02    with 2 stents     ANGIOGRAM  04     ANGIOGRAM   2017, 2 stents in RCA    at [facility]      CLOSED RX CLAVICLE FRACTURE       CORONARY ARTERY BYPASS  09     TONSILLECTOMY & ADENOIDECTOMY       [facility] ANESTH,SURGERY OF SHOULDER  07    left shoulder arthroplasty     [facility] HAND/FINGER SURGERY UNLISTED      right thumb deep lac repair     Social History     Socioeconomic History     Marital status:      Spouse name: Not on file     Number of children: Not on file     Years of education: Not on file     Highest education level: Not on file   Occupational History     Not on file   Tobacco Use     Smoking status: Former     Current packs/day: 0.00     Types: Cigarettes     Start date: 1960     Quit date: 1990     Years since quittin.5     Passive exposure: Never     Smokeless tobacco: Never   Vaping Use     Vaping status: Never Used   Substance and Sexual Activity     Alcohol use: No     Alcohol/week: 0.0 standard drinks of alcohol     Comment: history of abuse      Drug use: No     Sexual activity: Not Currently   Other Topics Concern     Parent/sibling w/ CABG, MI or angioplasty before 65F 55M? Not Asked   Social History Narrative     Not on file     Social Determinants of Health     Financial Resource Strain: Low Risk  (2024)    Received from [employer] [facility], [facility] [employer] [facility]    Financial Resource Strain      Difficulty of Paying Living Expenses: 3      Difficulty of Paying Living Expenses: Not on file   Food Insecurity: No Food Insecurity (2024)    Received from [employer] [facility], [facility] [employer] [facility]    Food Insecurity      Worried About Running Out of Food in the Last Year: 1   Transportation Needs: No Transportation Needs (2024)    Received from [employer] [facility], [facility] [employer] [facility]    Transportation Needs      Lack of Transportation (Medical): 1   Physical  Activity: Not on file   Stress: Not on file   Social Connections: Socially Integrated (2/13/2024)    Received from [employer] [facility], [facility] [employer] [facility]    Social Connections      Frequency of Communication with Friends and Family: 0   Interpersonal Safety: Low Risk  (10/26/2023)    Interpersonal Safety      Do you feel physically and emotionally safe where you currently live?: Yes      Within the past 12 months, have you been hit, slapped, kicked or otherwise physically hurt by someone?: No      Within the past 12 months, have you been humiliated or emotionally abused in other ways by your partner or ex-partner?: No   Housing Stability: Low Risk  (2/13/2024)    Received from [facility], [facility] [facility]    Housing Stability      Unable to Pay for Housing in the Last Year: 1     Family History   Problem Relation Age of Onset     Hypertension Mother      Cerebrovascular Disease Mother      Cardiovascular Father      C.A.D. Brother      Diabetes Brother      Hypertension Brother      Cardiovascular Brother          Lab Results   Component Value Date    A1C 7.2 07/15/2024    A1C 7.4 04/17/2024    A1C 7.8 05/19/2023    A1C 8.2 12/09/2022    A1C 9.0 05/04/2022    A1C 7.4 11/13/2020    A1C 7.4 07/22/2020    A1C 7.5 02/20/2020    A1C 7.4 10/03/2019    A1C 7.4 07/02/2019     Last Comprehensive Metabolic Panel:  Lab Results   Component Value Date     07/15/2024    POTASSIUM 4.7 07/15/2024    CHLORIDE 103 07/15/2024    CO2 28 07/15/2024    ANIONGAP 8 07/15/2024     (H) 07/15/2024    BUN 12.5 07/15/2024    CR 1.09 (H) 07/15/2024    GFRESTIMATED 51 (L) 07/15/2024    MIKKI 9.3 07/15/2024         Subjective findings- 79-year-old returns clinic with daughter for Diabetic foot cares.  Relates to using the Loprox cream with no problems, relates to not getting the diabetic shoes, relates to no ulcers or sores  since we seen her last, relates to tingling in her feet, relates to no injuries, relates to wearing compression socks.    Objective findings- DP and PT are 2 out of 4 bilaterally.  Has decreased hair growth bilaterally.  Has peripheral edema with venous stasis bilaterally.  Has hyperkeratotic tissue buildup with ecchymosis distal second and third toes on the left and distal third toe on the right.  Has decreased dry scaly skin in moccasin pattern bilaterally.  Has dorsally contracted digits 1 through 5 bilaterally.  Has dystrophic incurvated brittle nails with subungual debris dystrophy and discoloration and thickening to differing degrees 1 through 5 bilaterally.  There is no erythema, no odor, no calor, no drainage, no tendon voids, no pain on palpation bilaterally.    Assessment and plan- Onychomycosis, Diabetes with peripheral Vascular disease and Kidney disease, Hammertoes, Tylomas with preulcerative changes distal toes.  Diagnosis and treatment options discussed with them.  All the toenails and calluses were debrided bilaterally upon consent, 6+ toenails were debrided.  The tylomas bilaterally were sharp debrided with a tissue cutter upon consent.  Continue Loprox cream for the onychomycosis and Tinea Pedis.  Advised him to get the diabetic shoes and inserts.  Previous notes reviewed.  Return to clinic and see me in 2 months.                                            Moderate level of medical decision making.      Again, thank you for allowing me to participate in the care of your patient.        Sincerely,        Abbe Miller DPM

## 2024-07-23 NOTE — NURSING NOTE
Anamaria Parra's chief complaint for this visit includes:  Chief Complaint   Patient presents with    Consult     Bilateral foot cares     PCP: Margaret Chandra    Referring Provider:  Margaret Chandra MD  [id number] [facility] [place] [address]    There were no vitals taken for this visit.  Data Unavailable     Do you need any medication refills at today's visit? NO    Allergies   Allergen Reactions    Dilantin [Phenytoin]      rash       Yara Mckinney LPN

## 2024-08-13 DIAGNOSIS — G89.4 CHRONIC PAIN SYNDROME: ICD-10-CM

## 2024-08-15 NOTE — TELEPHONE ENCOUNTER
reviewed.   No recent Rx documented??   Is patient taking Tramadol or picking it up from the pharmacy?   RN- please call to clarify with pharmacy and if able please double check  as well. Thanks.

## 2024-08-16 RX ORDER — TRAMADOL HYDROCHLORIDE 50 MG/1
100 TABLET ORAL EVERY 8 HOURS PRN
Qty: 180 TABLET | Refills: 0 | Status: SHIPPED | OUTPATIENT
Start: 2024-08-16 | End: 2024-09-23

## 2024-08-16 NOTE — TELEPHONE ENCOUNTER
"Patient called to check status of refill request.  She will be out of meds tomorrow and her son is going out of town so she would like this refilled asap for her son to  at the pharmacy for her before he leaves out of town.    Patient has been taking Tramadol  She thinks she last filled this on 7/10 or 7/11    RN does not have access to  but per \"outside medication reconciliation\"  section in epic chart-     Last dispensed on 7/10/24 for 180 tabs (30 day supply)        Rios Medina RN  [facility]    "

## 2024-08-16 NOTE — TELEPHONE ENCOUNTER
Attempted to call patient with number on file to let her know script was sent by covering provider. No answer, left voicemail to call clinic back at [phone].    Micaela Hill, RN on 8/16/2024 at 12:38 PM    Reason for Call:  call back    Detailed comments: Pt has had constipation for 2 1/2 weeks. She has taking some over the counter and it is not working. Plz call Pt to talk about this issues.     Phone Number Patient can be reached at: Cell number on file:    Telephone Information:   Mobile [phone]       Best Time: any    Can we leave a detailed message on this number?  Yes      Call taken on 4/17/2020 at 2:01 PM by Rosalva Muhammad

## 2024-08-20 NOTE — TELEPHONE ENCOUNTER
Called [phone] (home)    Did they answer the phone: No, left a message on voicemail to return call to the [facility] at [phone], and to ask for any available triage nurse.  (RN did not leave specific details on voicemail for confidential reasons)    Courtney BOLDEN RN  Triage Nurse  [facility]

## 2024-08-20 NOTE — TELEPHONE ENCOUNTER
Pt already picked up her medication      Closing encounter.      Jackelyn, RN    Triage Nurse  [facility]

## 2024-09-23 DIAGNOSIS — E78.5 HYPERLIPIDEMIA LDL GOAL <100: ICD-10-CM

## 2024-09-23 DIAGNOSIS — G89.4 CHRONIC PAIN SYNDROME: ICD-10-CM

## 2024-09-23 RX ORDER — ROSUVASTATIN CALCIUM 40 MG/1
40 TABLET, COATED ORAL DAILY
Qty: 90 TABLET | Refills: 1 | Status: SHIPPED | OUTPATIENT
Start: 2024-09-23

## 2024-09-25 DIAGNOSIS — G89.4 CHRONIC PAIN SYNDROME: ICD-10-CM

## 2024-09-25 RX ORDER — TRAMADOL HYDROCHLORIDE 50 MG/1
100 TABLET ORAL EVERY 8 HOURS PRN
Qty: 180 TABLET | Refills: 0 | Status: SHIPPED | OUTPATIENT
Start: 2024-09-25

## 2024-09-25 RX ORDER — TRAMADOL HYDROCHLORIDE 50 MG/1
TABLET ORAL
Qty: 180 TABLET | OUTPATIENT
Start: 2024-09-25

## 2024-10-24 DIAGNOSIS — G89.4 CHRONIC PAIN SYNDROME: ICD-10-CM

## 2024-10-24 RX ORDER — TRAMADOL HYDROCHLORIDE 50 MG/1
100 TABLET ORAL EVERY 8 HOURS PRN
Qty: 180 TABLET | Refills: 0 | Status: SHIPPED | OUTPATIENT
Start: 2024-10-24

## 2024-10-24 NOTE — TELEPHONE ENCOUNTER
Patient requesting refill of traMADol (ULTRAM) 50 MG tablet. Patient says she and the pharmacy have requested this.

## 2024-10-25 DIAGNOSIS — G89.4 CHRONIC PAIN SYNDROME: ICD-10-CM

## 2024-10-27 RX ORDER — TRAMADOL HYDROCHLORIDE 50 MG/1
TABLET ORAL
Qty: 180 TABLET | OUTPATIENT
Start: 2024-10-27

## 2024-11-14 ENCOUNTER — TELEPHONE (OUTPATIENT)
Dept: FAMILY MEDICINE | Facility: CLINIC | Age: 79
End: 2024-11-14
Payer: COMMERCIAL

## 2024-11-14 NOTE — TELEPHONE ENCOUNTER
Patient's son calling    They are concerned that pt has not been able to check her blood glucose due to her failing vision.  She is not able to see the glucometer and therefor hasn't checked her blood sugar in about 8 months. Per her son, pt often feels like she is having low blood sugars so she sits down and takes glucose tabs until she feels better.  Ministerio states that she sometimes eats up to 8 tabs.    Further concerns are that she needs legs and buttocks checked for bed sores.  She has a history of sores due to diabetes, per family she will not let them check her legs at all.      They are hoping PCP would consider ordering a CGM for patient due to failing vision and not being able to check.  Likely would need an exception letter sent to [facility] since pt is not on insulin.    Family says they are unable to make an appointment for her and is asking the clinic to reach out to let her know she is due for health maintenance, which she is.  Pt would then reach out to her son and assist in scheduling.    Will reach out to Pt directly as she likes to be in more control and would be upset that her son had called.    Attempted to call patient to let her know she is due for a visit with her PCP.  Left  for her to call back         RUBY Hayden    Triage Nurse  [facility]

## 2024-11-14 NOTE — TELEPHONE ENCOUNTER
Family is wanting patient to be see sooner than first available for a physical and issues listed below.

## 2024-11-14 NOTE — TELEPHONE ENCOUNTER
Noted.   Unable to complete all this in one 30 min visit slot.    Please help patient make # 2 appointments.   - Diabetes follow up visit- Ok to use a OLINDA slot.   - Annual Wellness visit can be scheduled in early 2025.  Thanks

## 2024-11-14 NOTE — TELEPHONE ENCOUNTER
Called the patient @ [phone]. Left a VM with Dr. Chandra's message verbatim. Advised when calling the scheduling line to ask to speak to a TC in order to schedule the appts. appropriately.   [facility] Registration

## 2024-11-20 ENCOUNTER — OFFICE VISIT (OUTPATIENT)
Dept: FAMILY MEDICINE | Facility: CLINIC | Age: 79
End: 2024-11-20
Payer: COMMERCIAL

## 2024-11-20 VITALS
BODY MASS INDEX: 26.24 KG/M2 | OXYGEN SATURATION: 96 % | SYSTOLIC BLOOD PRESSURE: 136 MMHG | HEIGHT: 68 IN | WEIGHT: 173.1 LBS | DIASTOLIC BLOOD PRESSURE: 62 MMHG | HEART RATE: 82 BPM | TEMPERATURE: 97.4 F | RESPIRATION RATE: 18 BRPM

## 2024-11-20 DIAGNOSIS — E11.65 TYPE 2 DIABETES MELLITUS WITH HYPERGLYCEMIA, WITHOUT LONG-TERM CURRENT USE OF INSULIN (H): Primary | ICD-10-CM

## 2024-11-20 DIAGNOSIS — N18.32 STAGE 3B CHRONIC KIDNEY DISEASE (H): ICD-10-CM

## 2024-11-20 DIAGNOSIS — F10.21 ALCOHOL DEPENDENCE IN REMISSION (H): ICD-10-CM

## 2024-11-20 DIAGNOSIS — N25.81 SECONDARY RENAL HYPERPARATHYROIDISM (H): ICD-10-CM

## 2024-11-20 DIAGNOSIS — F11.20 CONTINUOUS OPIOID DEPENDENCE (H): ICD-10-CM

## 2024-11-20 LAB
ALBUMIN SERPL BCG-MCNC: 4 G/DL (ref 3.5–5.2)
ALP SERPL-CCNC: 68 U/L (ref 40–150)
ALT SERPL W P-5'-P-CCNC: 23 U/L (ref 0–50)
ANION GAP SERPL CALCULATED.3IONS-SCNC: 9 MMOL/L (ref 7–15)
AST SERPL W P-5'-P-CCNC: 26 U/L (ref 0–45)
BILIRUB SERPL-MCNC: 0.6 MG/DL
BUN SERPL-MCNC: 16.3 MG/DL (ref 8–23)
CALCIUM SERPL-MCNC: 9.6 MG/DL (ref 8.8–10.4)
CHLORIDE SERPL-SCNC: 106 MMOL/L (ref 98–107)
CREAT SERPL-MCNC: 1.07 MG/DL (ref 0.51–0.95)
CREAT UR-MCNC: 146 MG/DL
EGFRCR SERPLBLD CKD-EPI 2021: 52 ML/MIN/1.73M2
EST. AVERAGE GLUCOSE BLD GHB EST-MCNC: 137 MG/DL
GLUCOSE SERPL-MCNC: 142 MG/DL (ref 70–99)
HBA1C MFR BLD: 6.4 % (ref 0–5.6)
HCO3 SERPL-SCNC: 27 MMOL/L (ref 22–29)
HOLD SPECIMEN: NORMAL
MICROALBUMIN UR-MCNC: 97.6 MG/L
MICROALBUMIN/CREAT UR: 66.85 MG/G CR (ref 0–25)
POTASSIUM SERPL-SCNC: 4.3 MMOL/L (ref 3.4–5.3)
PROT SERPL-MCNC: 7.2 G/DL (ref 6.4–8.3)
SODIUM SERPL-SCNC: 142 MMOL/L (ref 135–145)
VIT D+METAB SERPL-MCNC: 51 NG/ML (ref 20–50)

## 2024-11-20 PROCEDURE — 80053 COMPREHEN METABOLIC PANEL: CPT | Performed by: FAMILY MEDICINE

## 2024-11-20 PROCEDURE — 36415 COLL VENOUS BLD VENIPUNCTURE: CPT | Performed by: FAMILY MEDICINE

## 2024-11-20 PROCEDURE — 82043 UR ALBUMIN QUANTITATIVE: CPT | Performed by: FAMILY MEDICINE

## 2024-11-20 PROCEDURE — 83036 HEMOGLOBIN GLYCOSYLATED A1C: CPT | Performed by: FAMILY MEDICINE

## 2024-11-20 PROCEDURE — 82306 VITAMIN D 25 HYDROXY: CPT | Performed by: FAMILY MEDICINE

## 2024-11-20 PROCEDURE — 82570 ASSAY OF URINE CREATININE: CPT | Performed by: FAMILY MEDICINE

## 2024-11-20 PROCEDURE — 99214 OFFICE O/P EST MOD 30 MIN: CPT | Performed by: FAMILY MEDICINE

## 2024-11-20 RX ORDER — GLIPIZIDE 5 MG/1
5 TABLET, FILM COATED, EXTENDED RELEASE ORAL DAILY
Qty: 90 TABLET | Refills: 3 | Status: SHIPPED | OUTPATIENT
Start: 2024-11-20

## 2024-11-20 NOTE — PROGRESS NOTES
"  Assessment & Plan   Zahida was seen today for diabetes and wound check.    Diagnoses and all orders for this visit:    Type 2 diabetes mellitus with hyperglycemia, without long-term current use of insulin (H), controlled with an A1c of 6.4  -     Hemoglobin A1c; Standing  -     Albumin Random Urine Quantitative with Creat Ratio  -     Comprehensive metabolic panel (BMP + Alb, Alk Phos, ALT, AST, Total. Bili, TP)  -     Hemoglobin A1c  -     Decrease dose: glipiZIDE (GLUCOTROL XL) 5 MG 24 hr tablet; Take 1 tablet (5 mg) by mouth daily. Recheck in 3 months.   -     Patient is interested in a CGM (continuous glucose monitor). She will check Insurance on coverage and options and let me know.    Stage 3b chronic kidney disease (H), improving  -     Albumin Random Urine Quantitative with Creat Ratio  -     Comprehensive metabolic panel (BMP + Alb, Alk Phos, ALT, AST, Total. Bili, TP)    Secondary renal hyperparathyroidism (H)  -     Parathyroid Hormone Intact; Future  -     Vitamin D Deficiency; Future  -      Last seen by Nephrology in 2022    Continuous opioid dependence (H)- on Tramadol       -    The [place] Prescription Monitoring Program has been reviewed and there are no concerns about diversionary activity for controlled substances at this time       -     On Tramadol. No refills needed at this time.     Alcohol dependence in remission (H)       -  Has been sober for many years. Last drink was in the 1990s      BMI  Estimated body mass index is 25.99 kg/m  as calculated from the following:    Height as of this encounter: 1.738 m (5' 8.43\").    Weight as of this encounter: 78.5 kg (173 lb 1.6 oz).   Weight management plan: Discussed healthy diet and exercise guidelines      Return in about 3 months (around 2/20/2025) for Annual Wellness Visit, Diabetes Follow Up with a HgbA1C prior to visit.      Subjective   Zahida is a 79 year old, presenting for the following health issues:    Daughter in law is present. "     Patient moved into [facility] about 5 months ago- enjoys its there and is thriving.       Diabetes and Wound Check        11/20/2024     8:46 AM   Additional Questions   Roomed by Lakisha   Accompanied by daughter in law     Via the Health Maintenance questionnaire, the patient has reported the following services have been completed -[place] Exam: [facility] 2024-[facility]-12, this information has been sent to the abstraction team.  History of Present Illness       Diabetes:   She presents for follow up of diabetes.  She is checking home blood glucose a few times a month.   She checks blood glucose before meals.  Blood glucose is never over 200 and sometimes under 70. She is aware of hypoglycemia symptoms including shakiness, dizziness, weakness, lethargy, blurred vision and other.   She is concerned about frequent infections and low blood sugar, several less than 70 in the past few weeks.   She is having numbness in feet.  The patient has had a diabetic eye exam in the last 12 months. Eye exam performed on novEmber 12 2024. Location of last eye exam [facility].        Reason for visit:  Need to have open wound looked at on leg and butt from [facility] hospital stay    She eats 0-1 servings of fruits and vegetables daily.She consumes 1 sweetened beverage(s) daily.She exercises with enough effort to increase her heart rate 10 to 19 minutes per day.  She exercises with enough effort to increase her heart rate 3 or less days per week.   She is taking medications regularly.     Diabetes-  Patient is currently on Glipizide 10 mg/day. Tolerating well. Reports occasional hypoglycemic episodes.   Would like a CGM. States that she is struggling with her vision and unable to use her Glucometer, test strips and lancets.   Following with ophthalmology for management of glaucoma and a retinal detachment in the right eye.  Seeing a Podiatry for foot care. Last visit was on 5/20/2024.   Due for diabetes labs.       RENAL  "INSUFFICIENCY - Patient has a longstanding history of moderate-severe chronic renal insufficiency and secondary renal hyperparathyroidism.  Last Cr -  Component      Latest Ref Rng 7/15/2024  9:56 AM   Creatinine      0.51 - 0.95 mg/dL 1.09 (H)    GFR Estimate      >60 mL/min/1.73m2 51 (L)       Last seen by Nephrology in 2022.    Opioid dependence-  Currently on tramadol for chronic joint pain management.  Tolerating well, no side effects reported.  No refills needed at this time.  [place]:  reviewed- no concerns    Has a history of alcohol dependence-currently in remission.  Has been sober for many years.  Last alcoholic drink was in the 1990s.    Review of Systems  Constitutional, HEENT, cardiovascular, pulmonary, gi and gu systems are negative, except as otherwise noted.      Objective    /62   Pulse 82   Temp 97.4  F (36.3  C) (Temporal)   Resp 18   Ht 1.738 m (5' 8.43\")   Wt 78.5 kg (173 lb 1.6 oz)   LMP  (LMP Unknown)   SpO2 96%   BMI 25.99 kg/m    Body mass index is 25.99 kg/m .  Physical Exam   GENERAL: alert and no distress  RESP: lungs clear to auscultation - no rales, rhonchi or wheezes  CV: regular rate and rhythm, normal S1 S2, no S3 or S4, no murmur, click or rub, no peripheral edema  SKIN: stasis dermatitis changes on bilateral lower extremities. No skin breakdown noted on the lower extremities or buttocks/sacral region.  TOENAILS: Onychomycosis.   DIABETIC FOOT EXAM- noted Podiatry notes on 5/20/2024    DATA  Reviewed and discussed with patient prior to discharge.  Results for orders placed or performed in visit on 11/20/24   Albumin Random Urine Quantitative with Creat Ratio     Status: Abnormal   Result Value Ref Range    Creatinine Urine mg/dL 146.0 mg/dL    Albumin Urine mg/L 97.6 mg/L    Albumin Urine mg/g Cr 66.85 (H) 0.00 - 25.00 mg/g Cr   Comprehensive metabolic panel (BMP + Alb, Alk Phos, ALT, AST, Total. Bili, TP)     Status: Abnormal "   Result Value Ref Range    Sodium 142 135 - 145 mmol/L    Potassium 4.3 3.4 - 5.3 mmol/L    Carbon Dioxide (CO2) 27 22 - 29 mmol/L    Anion Gap 9 7 - 15 mmol/L    Urea Nitrogen 16.3 8.0 - 23.0 mg/dL    Creatinine 1.07 (H) 0.51 - 0.95 mg/dL    GFR Estimate 52 (L) >60 mL/min/1.73m2    Calcium 9.6 8.8 - 10.4 mg/dL    Chloride 106 98 - 107 mmol/L    Glucose 142 (H) 70 - 99 mg/dL    Alkaline Phosphatase 68 40 - 150 U/L    AST 26 0 - 45 U/L    ALT 23 0 - 50 U/L    Protein Total 7.2 6.4 - 8.3 g/dL    Albumin 4.0 3.5 - 5.2 g/dL    Bilirubin Total 0.6 <=1.2 mg/dL   Extra Purple Top EDTA (LAB USE ONLY)     Status: None   Result Value Ref Range    Hold Specimen [facility]    Hemoglobin A1c     Status: Abnormal   Result Value Ref Range    Estimated Average Glucose 137 (H) <117 mg/dL    Hemoglobin A1C 6.4 (H) 0.0 - 5.6 %             Signed Electronically by: Margaret Chandra MD

## 2024-11-21 LAB — PTH-INTACT SERPL-MCNC: 43 PG/ML (ref 15–65)

## 2024-12-27 DIAGNOSIS — G89.4 CHRONIC PAIN SYNDROME: ICD-10-CM

## 2024-12-27 DIAGNOSIS — I10 HYPERTENSION GOAL BP (BLOOD PRESSURE) < 140/90: ICD-10-CM

## 2024-12-30 RX ORDER — TRAMADOL HYDROCHLORIDE 50 MG/1
100 TABLET ORAL EVERY 8 HOURS PRN
Qty: 90 TABLET | Refills: 0 | Status: SHIPPED | OUTPATIENT
Start: 2024-12-30

## 2024-12-30 RX ORDER — METOPROLOL SUCCINATE 50 MG/1
TABLET, EXTENDED RELEASE ORAL
Qty: 135 TABLET | Refills: 1 | Status: SHIPPED | OUTPATIENT
Start: 2024-12-30

## 2024-12-30 NOTE — TELEPHONE ENCOUNTER
Requested Prescriptions   Pending Prescriptions Disp Refills    metoprolol succinate ER (TOPROL XL) 50 MG 24 hr tablet [Pharmacy Med Name: METOPROLOL ER SUCCINATE 50MG TABS] 135 tablet 1     Sig: TAKE 1 AND 1/2 TABLETS BY MOUTH EVERY DAY       Beta-Blockers Protocol Passed - 12/30/2024 11:34 AM        Passed - Most recent blood pressure under 140/90 in past 12 months     BP Readings from Last 3 Encounters:   11/20/24 136/62   07/15/24 134/60   04/17/24 124/72       No data recorded            Passed - Patient is age 6 or older        Passed - Medication is active on med list        Passed - Medication indicated for associated diagnosis     Medication is associated with one or more of the following diagnoses:     Hypertension (HTN)   Atrial fibrillation/flutter   Angina   ASCVD   Migraine   Heart Failure   Tremor   Anxiety   Ocular hypertension   Glaucoma   PTSD   Obstructive hypertrophic cardiomyopathy   History of myocarditis   Palpitations   POTS (postural orthostatic tachycardia syndrome)   SVT (supraventricular tachycardia)   Hyperthyroidism   Tachycardia   Acute non-st segment elevation myocardial infarction   Subsequent non-st segment elevation myocardial infarction   Ischemic myocardial dysfunction          Passed - Recent (12 mo) or future (90 days) visit within the authorizing provider's specialty     The patient must have completed an in-person or virtual visit within the past 12 months or has a future visit scheduled within the next 90 days with the authorizing provider s specialty.  Urgent care and e-visits do not qualify as an office visit for this protocol.            traMADol (ULTRAM) 50 MG tablet [Pharmacy Med Name: TRAMADOL 50MG TABLETS] 180 tablet      Sig: TAKE 2 TABLETS(100 MG) BY MOUTH EVERY 8 HOURS AS NEEDED FOR SEVERE PAIN. MAX OF 6 TABLETS DAILY       There is no refill protocol information for this order

## 2024-12-30 NOTE — TELEPHONE ENCOUNTER
Attempted to call patient at home number, no answer, left message on voicemail; patient was instructed to return call to [facility] at [phone].    Hilda CHAHAL RN  [facility] Triage

## 2024-12-30 NOTE — TELEPHONE ENCOUNTER
Patient called stated she requested these refills on Friday and has not heard back. She is aware she has to allow for up to 3 business days for a reply but her son picks her meds up for her and is going out of town tonight so she wont have anyone to  her meds for a while. I told her metoprolol passes nurse protocol so I can send this in but the tramadol has to be approved by a provider.       Patient hopes to get this done today by 3-4 pm so her son can pick the meds up before he leaves town.     Thanks,  RUBY Blair  [facility]

## 2025-01-02 NOTE — TELEPHONE ENCOUNTER
Patient called back- reviewed message and let her know this is common to only get a partial fill till PCP is seen again.    Pt states will have plenty of medication till seen 1/15      Jackelyn RN    Triage Nurse  [facility]

## 2025-01-02 NOTE — TELEPHONE ENCOUNTER
Called [phone] (home)    Did they answer the phone: No, left a message on voicemail to return call to the [facility] at [phone], and to ask for any available triage nurse.  (RN did not leave specific details on voicemail for confidential reasons)    Courtney BOLDEN RN  Triage Nurse  [facility]

## 2025-01-15 ENCOUNTER — OFFICE VISIT (OUTPATIENT)
Dept: FAMILY MEDICINE | Facility: CLINIC | Age: 80
End: 2025-01-15
Payer: COMMERCIAL

## 2025-01-15 VITALS
HEART RATE: 94 BPM | WEIGHT: 166 LBS | SYSTOLIC BLOOD PRESSURE: 138 MMHG | RESPIRATION RATE: 16 BRPM | TEMPERATURE: 98.3 F | HEIGHT: 68 IN | OXYGEN SATURATION: 94 % | DIASTOLIC BLOOD PRESSURE: 64 MMHG | BODY MASS INDEX: 25.16 KG/M2

## 2025-01-15 DIAGNOSIS — Z00.00 ENCOUNTER FOR MEDICARE ANNUAL WELLNESS EXAM: Primary | ICD-10-CM

## 2025-01-15 DIAGNOSIS — Z79.891 LONG TERM (CURRENT) USE OF OPIATE ANALGESIC: ICD-10-CM

## 2025-01-15 DIAGNOSIS — I10 HYPERTENSION GOAL BP (BLOOD PRESSURE) < 140/90: ICD-10-CM

## 2025-01-15 DIAGNOSIS — F10.21 ALCOHOL DEPENDENCE IN REMISSION (H): ICD-10-CM

## 2025-01-15 DIAGNOSIS — E11.65 TYPE 2 DIABETES MELLITUS WITH HYPERGLYCEMIA, WITHOUT LONG-TERM CURRENT USE OF INSULIN (H): ICD-10-CM

## 2025-01-15 DIAGNOSIS — Z23 NEED FOR VACCINATION: ICD-10-CM

## 2025-01-15 DIAGNOSIS — B35.1 ONYCHOMYCOSIS OF MULTIPLE TOENAILS WITH TYPE 2 DIABETES MELLITUS (H): ICD-10-CM

## 2025-01-15 DIAGNOSIS — Z79.899 CONTROLLED SUBSTANCE AGREEMENT SIGNED: ICD-10-CM

## 2025-01-15 DIAGNOSIS — G89.4 CHRONIC PAIN SYNDROME: ICD-10-CM

## 2025-01-15 DIAGNOSIS — E78.5 HYPERLIPIDEMIA LDL GOAL <100: ICD-10-CM

## 2025-01-15 DIAGNOSIS — E11.69 ONYCHOMYCOSIS OF MULTIPLE TOENAILS WITH TYPE 2 DIABETES MELLITUS (H): ICD-10-CM

## 2025-01-15 DIAGNOSIS — N18.31 STAGE 3A CHRONIC KIDNEY DISEASE (H): ICD-10-CM

## 2025-01-15 DIAGNOSIS — F11.20 CONTINUOUS OPIOID DEPENDENCE (H): ICD-10-CM

## 2025-01-15 PROBLEM — N18.4 CKD (CHRONIC KIDNEY DISEASE) STAGE 4, GFR 15-29 ML/MIN (H): Status: RESOLVED | Noted: 2022-01-12 | Resolved: 2025-01-15

## 2025-01-15 LAB
AMPHETAMINES UR QL SCN: NORMAL
BARBITURATES UR QL SCN: NORMAL
BENZODIAZ UR QL SCN: NORMAL
BZE UR QL SCN: NORMAL
CANNABINOIDS UR QL SCN: NORMAL
FENTANYL UR QL: NORMAL
OPIATES UR QL SCN: NORMAL
PCP QUAL URINE (ROCHE): NORMAL

## 2025-01-15 PROCEDURE — 36415 COLL VENOUS BLD VENIPUNCTURE: CPT | Performed by: FAMILY MEDICINE

## 2025-01-15 PROCEDURE — 80307 DRUG TEST PRSMV CHEM ANLYZR: CPT | Performed by: FAMILY MEDICINE

## 2025-01-15 RX ORDER — TRAMADOL HYDROCHLORIDE 50 MG/1
100 TABLET ORAL EVERY 8 HOURS PRN
Qty: 90 TABLET | Refills: 0 | Status: SHIPPED | OUTPATIENT
Start: 2025-01-15

## 2025-01-15 RX ORDER — METOPROLOL SUCCINATE 50 MG/1
TABLET, EXTENDED RELEASE ORAL
Qty: 135 TABLET | Refills: 3 | Status: SHIPPED | OUTPATIENT
Start: 2025-01-15

## 2025-01-15 SDOH — HEALTH STABILITY: PHYSICAL HEALTH: ON AVERAGE, HOW MANY DAYS PER WEEK DO YOU ENGAGE IN MODERATE TO STRENUOUS EXERCISE (LIKE A BRISK WALK)?: 0 DAYS

## 2025-01-15 SDOH — HEALTH STABILITY: PHYSICAL HEALTH: ON AVERAGE, HOW MANY MINUTES DO YOU ENGAGE IN EXERCISE AT THIS LEVEL?: 0 MIN

## 2025-01-15 ASSESSMENT — PAIN SCALES - PAIN ENJOYMENT GENERAL ACTIVITY SCALE (PEG)
PEG_TOTALSCORE: 7
PEG_TOTALSCORE: 7
INTERFERED_ENJOYMENT_LIFE: 7
INTERFERED_GENERAL_ACTIVITY: 7
AVG_PAIN_PASTWEEK: 7
INTERFERED_GENERAL_ACTIVITY: 7
INTERFERED_ENJOYMENT_LIFE: 7
AVG_PAIN_PASTWEEK: 7

## 2025-01-15 ASSESSMENT — ANXIETY QUESTIONNAIRES
7. FEELING AFRAID AS IF SOMETHING AWFUL MIGHT HAPPEN: NOT AT ALL
3. WORRYING TOO MUCH ABOUT DIFFERENT THINGS: NOT AT ALL
IF YOU CHECKED OFF ANY PROBLEMS ON THIS QUESTIONNAIRE, HOW DIFFICULT HAVE THESE PROBLEMS MADE IT FOR YOU TO DO YOUR WORK, TAKE CARE OF THINGS AT HOME, OR GET ALONG WITH OTHER PEOPLE: NOT DIFFICULT AT ALL
4. TROUBLE RELAXING: NOT AT ALL
8. IF YOU CHECKED OFF ANY PROBLEMS, HOW DIFFICULT HAVE THESE MADE IT FOR YOU TO DO YOUR WORK, TAKE CARE OF THINGS AT HOME, OR GET ALONG WITH OTHER PEOPLE?: NOT DIFFICULT AT ALL
GAD7 TOTAL SCORE: 0
6. BECOMING EASILY ANNOYED OR IRRITABLE: NOT AT ALL
2. NOT BEING ABLE TO STOP OR CONTROL WORRYING: NOT AT ALL
5. BEING SO RESTLESS THAT IT IS HARD TO SIT STILL: NOT AT ALL
GAD7 TOTAL SCORE: 0
GAD7 TOTAL SCORE: 0
1. FEELING NERVOUS, ANXIOUS, OR ON EDGE: NOT AT ALL
7. FEELING AFRAID AS IF SOMETHING AWFUL MIGHT HAPPEN: NOT AT ALL

## 2025-01-15 ASSESSMENT — PATIENT HEALTH QUESTIONNAIRE - PHQ9
10. IF YOU CHECKED OFF ANY PROBLEMS, HOW DIFFICULT HAVE THESE PROBLEMS MADE IT FOR YOU TO DO YOUR WORK, TAKE CARE OF THINGS AT HOME, OR GET ALONG WITH OTHER PEOPLE: NOT DIFFICULT AT ALL
SUM OF ALL RESPONSES TO PHQ QUESTIONS 1-9: 3
SUM OF ALL RESPONSES TO PHQ QUESTIONS 1-9: 3

## 2025-01-15 ASSESSMENT — SOCIAL DETERMINANTS OF HEALTH (SDOH): HOW OFTEN DO YOU GET TOGETHER WITH FRIENDS OR RELATIVES?: MORE THAN THREE TIMES A WEEK

## 2025-01-15 NOTE — PATIENT INSTRUCTIONS
Patient Education   Preventive Care Advice   This is general advice given by our system to help you stay healthy. However, your care team may have specific advice just for you. Please talk to your care team about your preventive care needs.  Nutrition  Eat 5 or more servings of fruits and vegetables each day.  Try wheat bread, brown rice and whole grain pasta (instead of white bread, rice, and pasta).  Get enough calcium and vitamin D. Check the label on foods and aim for 100% of the RDA (recommended daily allowance).  Lifestyle  Exercise at least 150 minutes each week  (30 minutes a day, 5 days a week).  Do muscle strengthening activities 2 days a week. These help control your weight and prevent disease.  No smoking.  Wear sunscreen to prevent skin cancer.  Have a dental exam and cleaning every 6 months.  Yearly exams  See your health care team every year to talk about:  Any changes in your health.  Any medicines your care team has prescribed.  Preventive care, family planning, and ways to prevent chronic diseases.  Shots (vaccines)   HPV shots (up to age 26), if you've never had them before.  Hepatitis B shots (up to age 59), if you've never had them before.  COVID-19 shot: Get this shot when it's due.  Flu shot: Get a flu shot every year.  Tetanus shot: Get a tetanus shot every 10 years.  Pneumococcal, hepatitis A, and RSV shots: Ask your care team if you need these based on your risk.  Shingles shot (for age 50 and up)  General health tests  Diabetes screening:  Starting at age 35, Get screened for diabetes at least every 3 years.  If you are younger than age 35, ask your care team if you should be screened for diabetes.  Cholesterol test: At age 39, start having a cholesterol test every 5 years, or more often if advised.  Bone density scan (DEXA): At age 50, ask your care team if you should have this scan for osteoporosis (brittle bones).  Hepatitis C: Get tested at least once in your life.  STIs (sexually  transmitted infections)  Before age 24: Ask your care team if you should be screened for STIs.  After age 24: Get screened for STIs if you're at risk. You are at risk for STIs (including HIV) if:  You are sexually active with more than one person.  You don't use condoms every time.  You or a partner was diagnosed with a sexually transmitted infection.  If you are at risk for HIV, ask about PrEP medicine to prevent HIV.  Get tested for HIV at least once in your life, whether you are at risk for HIV or not.  Cancer screening tests  Cervical cancer screening: If you have a cervix, begin getting regular cervical cancer screening tests starting at age 21.  Breast cancer scan (mammogram): If you've ever had breasts, begin having regular mammograms starting at age 40. This is a scan to check for breast cancer.  Colon cancer screening: It is important to start screening for colon cancer at age 45.  Have a colonoscopy test every 10 years (or more often if you're at risk) Or, ask your provider about stool tests like a FIT test every year or Cologuard test every 3 years.  To learn more about your testing options, visit:   .  For help making a decision, visit:   https://bit.ly/mt74159.  Prostate cancer screening test: If you have a prostate, ask your care team if a prostate cancer screening test (PSA) at age 55 is right for you.  Lung cancer screening: If you are a current or former smoker ages 50 to 80, ask your care team if ongoing lung cancer screenings are right for you.  For informational purposes only. Not to replace the advice of your health care provider. Copyright   2023 [facility]. All rights reserved. Clinically reviewed by the [facility]. [employer] [id number] - REV 01/24.  Learning About Activities of Daily Living  What are activities of daily living?     Activities of daily living (ADLs) are the basic self-care tasks you do every day. These include eating, bathing, dressing,  and moving around.  As you age, and if you have health problems, you may find that it's harder to do some of these tasks. If so, your doctor can suggest ideas that may help.  To measure what kind of help you may need, your doctor will ask how well you are able to do ADLs. Let your doctor know if there are any tasks that you are having trouble doing. This is an important first step to getting help. And when you have the help you need, you can stay as independent as possible.  How will a doctor assess your ADLs?  Asking about ADLs is part of a routine health checkup your doctor will likely do as you age. Your health check might be done in a doctor's office, in your home, or at a hospital. The goal is to find out if you are having any problems that could make it hard to care for yourself or that make it unsafe for you to be on your own.  To measure your ADLs, your doctor will ask how hard it is for you to do routine tasks. Your doctor may also want to know if you have changed the way you do a task because of a health problem. Your doctor may watch how you:  Walk back and forth.  Keep your balance while you stand or walk.  Move from sitting to standing or from a bed to a chair.  Button or unbutton a shirt or sweater.  Remove and put on your shoes.  It's common to feel a little worried or anxious if you find you can't do all the things you used to be able to do. Talking with your doctor about ADLs is a way to make sure you're as safe as possible and able to care for yourself as well as you can. You may want to bring a caregiver, friend, or family member to your checkup. They can help you talk to your doctor.  Follow-up care is a key part of your treatment and safety. Be sure to make and go to all appointments, and call your doctor if you are having problems. It's also a good idea to know your test results and keep a list of the medicines you take.  Current as of: October 24, 2023  Content Version: 14.3    2024 [facility]  [employer].   Care instructions adapted under license by your healthcare professional. If you have questions about a medical condition or this instruction, always ask your healthcare professional. [facility] [employer] disclaims any warranty or liability for your use of this information.    Preventing Falls: Care Instructions  Injuries and health problems such as trouble walking or poor eyesight can increase your risk of falling. So can some medicines. But there are things you can do to help prevent falls. You can exercise to get stronger. You can also arrange your home to make it safer.    Talk to your doctor about the medicines you take. Ask if any of them increase the risk of falls and whether they can be changed or stopped.   Try to exercise regularly. It can help improve your strength and balance. This can help lower your risk of falling.         Practice fall safety and prevention.   Wear low-heeled shoes that fit well and give your feet good support. Talk to your doctor if you have foot problems that make this hard.  Carry a cellphone or wear a medical alert device that you can use to call for help.  Use stepladders instead of chairs to reach high objects. Don't climb if you're at risk for falls. Ask for help, if needed.  Wear the correct eyeglasses, if you need them.        Make your home safer.   Remove rugs, cords, clutter, and furniture from walkways.  Keep your house well lit. Use night-lights in hallways and bathrooms.  Install and use sturdy handrails on stairways.  Wear nonskid footwear, even inside. Don't walk barefoot or in socks without shoes.        Be safe outside.   Use handrails, curb cuts, and ramps whenever possible.  Keep your hands free by using a shoulder bag or backpack.  Try to walk in well-lit areas. Watch out for uneven ground, changes in pavement, and debris.  Be careful in the winter. Walk on the grass or gravel when sidewalks are slippery. Use de-icer on steps and walkways.  "Add non-slip devices to shoes.    Put grab bars and nonskid mats in your shower or tub and near the toilet. Try to use a shower chair or bath bench when bathing.   Get into a tub or shower by putting in your weaker leg first. Get out with your strong side first. Have a phone or medical alert device in the bathroom with you.   Where can you learn more?  Go to https://www.Stockbet.com.BONESUPPORT/patiented  Enter G117 in the search box to learn more about \"Preventing Falls: Care Instructions.\"  Current as of: July 31, 2024  Content Version: 14.3    2024 [employer].   Care instructions adapted under license by your healthcare professional. If you have questions about a medical condition or this instruction, always ask your healthcare professional. [employer] disclaims any warranty or liability for your use of this information.    Hearing Loss: Care Instructions  Overview     Hearing loss is a sudden or slow decrease in how well you hear. It can range from slight to profound. Permanent hearing loss can occur with aging. It also can happen when you are exposed long-term to loud noise. Examples include listening to loud music, riding motorcycles, or being around other loud machines.  Hearing loss can affect your work and home life. It can make you feel lonely or depressed. You may feel that you have lost your independence. But hearing aids and other devices can help you hear better and feel connected to others.  Follow-up care is a key part of your treatment and safety. Be sure to make and go to all appointments, and call your doctor if you are having problems. It's also a good idea to know your test results and keep a list of the medicines you take.  How can you care for yourself at home?  Avoid loud noises whenever possible. This helps keep your hearing from getting worse.  Always wear hearing protection around loud noises.  Wear a hearing aid as directed.  A professional can help you pick a hearing aid " "that will work best for you.  You can also get hearing aids over the counter for mild to moderate hearing loss.  Have hearing tests as your doctor suggests. They can show whether your hearing has changed. Your hearing aid may need to be adjusted.  Use other devices as needed. These may include:  Telephone amplifiers and hearing aids that can connect to a television, stereo, radio, or microphone.  Devices that use lights or vibrations. These alert you to the doorbell, a ringing telephone, or a baby monitor.  Television closed-captioning. This shows the words at the bottom of the screen. Most new TVs can do this.  TTY (text telephone). This lets you type messages back and forth on the telephone instead of talking or listening. These devices are also called TDD. When messages are typed on the keyboard, they are sent over the phone line to a receiving TTY. The message is shown on a monitor.  Use text messaging, social media, and email if it is hard for you to communicate by telephone.  Try to learn a listening technique called speechreading. It is not lipreading. You pay attention to people's gestures, expressions, posture, and tone of voice. These clues can help you understand what a person is saying. Face the person you are talking to, and have them face you. Make sure the lighting is good. You need to see the other person's face clearly.  Think about counseling if you need help to adjust to your hearing loss.  When should you call for help?  Watch closely for changes in your health, and be sure to contact your doctor if:    You think your hearing is getting worse.     You have new symptoms, such as dizziness or nausea.   Where can you learn more?  Go to https://www.[employer].net/patiented  Enter R798 in the search box to learn more about \"Hearing Loss: Care Instructions.\"  Current as of: September 27, 2023  Content Version: 14.3    2024 [employer].   Care instructions adapted under license by your " healthcare professional. If you have questions about a medical condition or this instruction, always ask your healthcare professional. [employer] disclaims any warranty or liability for your use of this information.    Bladder Training: Care Instructions  Your Care Instructions     Bladder training is used to treat urge incontinence and stress incontinence. Urge incontinence means that the need to urinate comes on so fast that you can't get to a toilet in time. Stress incontinence means that you leak urine because of pressure on your bladder. For example, it may happen when you laugh, cough, or lift something heavy.  Bladder training can increase how long you can wait before you have to urinate. It can also help your bladder hold more urine. And it can give you better control over the urge to urinate.  It is important to remember that bladder training takes a few weeks to a few months to make a difference. You may not see results right away, but don't give up.  Follow-up care is a key part of your treatment and safety. Be sure to make and go to all appointments, and call your doctor if you are having problems. It's also a good idea to know your test results and keep a list of the medicines you take.  How can you care for yourself at home?  Work with your doctor to come up with a bladder training program that is right for you. You may use one or more of the following methods.  Delayed urination  In the beginning, try to keep from urinating for 5 minutes after you first feel the need to go.  While you wait, take deep, slow breaths to relax. Kegel exercises can also help you delay the need to go to the bathroom.  After some practice, when you can easily wait 5 minutes to urinate, try to wait 10 minutes before you urinate.  Slowly increase the waiting period until you are able to control when you have to urinate.  Scheduled urination  Empty your bladder when you first wake up in the morning.  Schedule times  "throughout the day when you will urinate.  Start by going to the bathroom every hour, even if you don't need to go.  Slowly increase the time between trips to the bathroom.  When you have found a schedule that works well for you, keep doing it.  If you wake up during the night and have to urinate, do it. Apply your schedule to waking hours only.  Kegel exercises  These tighten and strengthen pelvic muscles, which can help you control the flow of urine. (If doing these exercises causes pain, stop doing them and talk with your doctor.) To do Kegel exercises:  Squeeze your muscles as if you were trying not to pass gas. Or squeeze your muscles as if you were stopping the flow of urine. Your belly, legs, and buttocks shouldn't move.  Hold the squeeze for 3 seconds, then relax for 5 to 10 seconds.  Start with 3 seconds, then add 1 second each week until you are able to squeeze for 10 seconds.  Repeat the exercise 10 times a session. Do 3 to 8 sessions a day.  When should you call for help?  Watch closely for changes in your health, and be sure to contact your doctor if:    Your incontinence is getting worse.     You do not get better as expected.   Where can you learn more?  Go to https://www.[employer].net/patiented  Enter V684 in the search box to learn more about \"Bladder Training: Care Instructions.\"  Current as of: April 30, 2024  Content Version: 14.3    2024 [employer].   Care instructions adapted under license by your healthcare professional. If you have questions about a medical condition or this instruction, always ask your healthcare professional. [employer] disclaims any warranty or liability for your use of this information.    Chronic Pain: Care Instructions  Your Care Instructions     Chronic pain is pain that lasts a long time (months or even years) and may or may not have a clear cause. It is different from acute pain, which usually does have a clear cause--like an injury or " illness--and gets better over time. Chronic pain:  Lasts over time but may vary from day to day.  Does not go away despite efforts to end it.  May disrupt your sleep and lead to fatigue.  May cause depression or anxiety.  May make your muscles tense, causing more pain.  Can disrupt your work, hobbies, home life, and relationships with friends and family.  Chronic pain is a very real condition. It is not just in your head. Treatment can help and usually includes several methods used together, such as medicines, physical therapy, exercise, and other treatments. Learning how to relax and changing negative thought patterns can also help you cope.  Chronic pain is complex. Taking an active role in your treatment will help you better manage your pain. Tell your doctor if you have trouble dealing with your pain. You may have to try several things before you find what works best for you.  Follow-up care is a key part of your treatment and safety. Be sure to make and go to all appointments, and call your doctor if you are having problems. It's also a good idea to know your test results and keep a list of the medicines you take.  How can you care for yourself at home?  Pace yourself. Break up large jobs into smaller tasks. Save harder tasks for days when you have less pain, or go back and forth between hard tasks and easier ones. Take rest breaks.  Relax, and reduce stress. Relaxation techniques such as deep breathing or meditation can help.  Keep moving. Gentle, daily exercise can help reduce pain over the long run. Try low- or no-impact exercises such as walking, swimming, and stationary biking. Do stretches to stay flexible.  Try heat, cold packs, and massage.  Get enough sleep. Chronic pain can make you tired and drain your energy. Talk with your doctor if you have trouble sleeping because of pain.  Think positive. Your thoughts can affect your pain level. Do things that you enjoy to distract yourself when you have pain  instead of focusing on the pain. See a movie, read a book, listen to music, or spend time with a friend.  If you think you are depressed, talk to your doctor about treatment.  Keep a daily pain diary. Record how your moods, thoughts, sleep patterns, activities, and medicine affect your pain. You may find that your pain is worse during or after certain activities or when you are feeling a certain emotion. Having a record of your pain can help you and your doctor find the best ways to treat your pain.  Take pain medicines exactly as directed.  If the doctor gave you a prescription medicine for pain, take it as prescribed.  If you are not taking a prescription pain medicine, ask your doctor if you can take an over-the-counter medicine.  Reducing constipation caused by pain medicine  Talk to your doctor about a laxative. If a laxative doesn't work, your doctor may suggest a prescription medicine.  Include fruits, vegetables, beans, and whole grains in your diet each day. These foods are high in fiber.  If your doctor recommends it, get more exercise. Walking is a good choice. Bit by bit, increase the amount you walk every day. Try for at least 30 minutes on most days of the week.  Schedule time each day for a bowel movement. A daily routine may help. Take your time and do not strain when having a bowel movement.  When should you call for help?   Call your doctor now or seek immediate medical care if:    Your pain gets worse or is out of control.     You feel down or blue, or you do not enjoy things like you once did. You may be depressed, which is common in people with chronic pain. Depression can be treated.     You have vomiting or cramps for more than 2 hours.   Watch closely for changes in your health, and be sure to contact your doctor if:    You cannot sleep because of pain.     You are very worried or anxious about your pain.     You have trouble taking your pain medicine.     You have any concerns about your  "pain medicine.     You have trouble with bowel movements, such as:  No bowel movement in 3 days.  Blood in the anal area, in your stool, or on the toilet paper.  Diarrhea for more than 24 hours.   Where can you learn more?  Go to https://www.[employer].net/patiented  Enter N004 in the search box to learn more about \"Chronic Pain: Care Instructions.\"  Current as of: July 31, 2024  Content Version: 14.3    2024 [employer].   Care instructions adapted under license by your healthcare professional. If you have questions about a medical condition or this instruction, always ask your healthcare professional. [employer] disclaims any warranty or liability for your use of this information.       "

## 2025-01-15 NOTE — LETTER
January 16, 2025      Zahida Parra  [id number] [place] [address]          Dear ,    We are writing to inform you of your test results.    Your recent labs looked good.     Thyroid function test was normal.   Urine drug screen was negative- do you take the Tramadol every single day?   Cholesterol panel was normal except that HDL (good cholesterol) was slightly low- I recommend you continue current dose of Rosuvastatin along with eating foods rich in omega 3 fatty acids.     Please contact me if you have any additional questions or concerns.     Sincerely,       Margaret Chandra MD   [facility]     Resulted Orders   TSH with free T4 reflex   Result Value Ref Range    TSH 0.39 0.30 - 4.20 uIU/mL   Lipid panel reflex to direct LDL Fasting   Result Value Ref Range    Cholesterol 108 <200 mg/dL    Triglycerides 50 <150 mg/dL    Direct Measure HDL 47 (L) >=50 mg/dL    LDL Cholesterol Calculated 51 <100 mg/dL    Non HDL Cholesterol 61 <130 mg/dL    Patient Fasting > 8hrs? Yes     Narrative    Cholesterol  Desirable: < 200 mg/dL  Borderline High: 200 - 239 mg/dL  High: >= 240 mg/dL    Triglycerides  Normal: < 150 mg/dL  Borderline High: 150 - 199 mg/dL  High: 200-499 mg/dL  Very High: >= 500 mg/dL    Direct Measure HDL  Female: >= 50 mg/dL   Male: >= 40 mg/dL    LDL Cholesterol  Desirable: < 100 mg/dL  Above Desirable: 100 - 129 mg/dL   Borderline High: 130 - 159 mg/dL   High:  160 - 189 mg/dL   Very High: >= 190 mg/dL    Non HDL Cholesterol  Desirable: < 130 mg/dL  Above Desirable: 130 - 159 mg/dL  Borderline High: 160 - 189 mg/dL  High: 190 - 219 mg/dL  Very High: >= 220 mg/dL   Urine Drug Screen Panel   Result Value Ref Range    Amphetamines Urine Screen Negative Screen Negative      Comment:      Cutoff for a negative amphetamine is less than 500 ng/mL.    Barbituates Urine Screen Negative Screen Negative      Comment:      Cutoff for a negative barbiturate is less than 200  ng/mL.    Benzodiazepine Urine Screen Negative Screen Negative      Comment:      Cutoff for a negative benzodiazepine is less than 100 ng/mL.    Cannabinoids Urine Screen Negative Screen Negative      Comment:      Cutoff for a negative cannabinoid is less than 50 ng/mL.    Cocaine Urine Screen Negative Screen Negative      Comment:      Cutoff for a negative cocaine is less than 300 ng/mL.    Fentanyl Qual Urine Screen Negative Screen Negative      Comment:      Cutoff for negative fentanyl is less than 5 ng/mL.    Opiates Urine Screen Negative Screen Negative      Comment:      Cutoff for a negative opiate is less than 300 ng/mL.    PCP Urine Screen Negative Screen Negative      Comment:      Cutoff for a negative PCP is less than 25 ng/mL.       If you have any questions or concerns, please call the clinic at the number listed above

## 2025-01-15 NOTE — LETTER

## 2025-01-15 NOTE — PROGRESS NOTES
Preventive Care Visit  [facility] GUILLERMO Chandra MD, Family Medicine  Julio 15, 2025      Assessment & Plan     Zahida was seen today for medicare visit.    Diagnoses and all orders for this visit:    Encounter for Medicare annual wellness exam  -     REVIEW OF HEALTH MAINTENANCE PROTOCOL ORDERS  -     PRIMARY CARE FOLLOW-UP SCHEDULING; Future  -     TSH with free T4 reflex    Chronic pain syndrome  -     Refill: traMADol (ULTRAM) 50 MG tablet; Take 2 tablets (100 mg) by mouth every 8 hours as needed for severe pain. MAX OF 6 TABLETS DAILY  -    [place] reviewed:  reviewed- no concerns      Long term (current) use of opiate analgesic on Continuous opioid dependence (H)  -     Urine Drug Screen; Future  -     Urine Drug Screen    Controlled substance agreement signed       -    Controlled substance agreement reviewed with patient and signed.  Copy given to patient.       Type 2 diabetes mellitus with hyperglycemia, without long-term current use of insulin (H), controlled.  Recent A1c 6.4         -     Continue current medications-Glipizide 5 mg daily.  Consider decreasing dose further at the next office visit    Hyperlipidemia LDL goal <100, on Crestor 40 mg  -     TSH with free T4 reflex  -     Lipid panel reflex to direct LDL Fasting  -     Continue current management.      Hypertension goal BP (blood pressure) < 140/90, controlled  -     Refill: Metoprolol succinate ER (TOPROL XL) 50 MG 24 hr tablet; TAKE 1 AND 1/2 TABLETS BY MOUTH EVERY DAY    Stage 3a chronic kidney disease (H), much improved.  Stable        -     Problem list updated.  Patient encouraged to keep well-hydrated daily    Onychomycosis of multiple toenails with type 2 diabetes mellitus (H)        -     Podiatry referral completed at the last office visit.      Alcohol dependence in remission (H)         -     Has been sober for many years. Last drink was in the 1990s    Need for vaccination  -      Tdap, tetanus-diptheria-acell pertussis, (BOOSTRIX) 5-2.5-18.5 LF-MCG/0.5 JYOTI injection; Inject 0.5 mLs into the muscle once for 1 dose.  -     RSV vaccine, bivalent, ABRYSVO, injection; Inject 0.5 mLs into the muscle once for 1 dose. Pharmacist administered            Patient has been advised of split billing requirements and indicates understanding: Yes        Counseling  Appropriate preventive services were addressed with this patient via screening, questionnaire, or discussion as appropriate for fall prevention, nutrition, physical activity, Tobacco-use cessation, social engagement, weight loss and cognition.  Checklist reviewing preventive services available has been given to the patient.  Reviewed patient's diet, addressing concerns and/or questions.   The patient was instructed to see the dentist every 6 months.   Updated plan of care.  Patient reported difficulty with activities of daily living were addressed today.Addressed any concerns about safety while driving.  The patient was provided with written information regarding signs of hearing loss.   Information on urinary incontinence and treatment options given to patient.   I have reviewed Opioid Use Disorder and Substance Use Disorder risk factors and made any needed referrals.     The longitudinal plan of care for the diagnosis(es)/condition(s) as documented were addressed during this visit. Due to the added complexity in care, I will continue to support Zahida in the subsequent management and with ongoing continuity of care.      Return in about 3 months (around 4/15/2025) for Diabetes Follow Up with a HgbA1C prior to visit.      Aldo Teague is a 79 year old, presenting for the following:  Medicare Visit        1/15/2025    11:48 AM   Additional Questions   Roomed by Lakisha   Accompanied by Daughter         SOLEDAD  Zahida is a pleasant 79 year old female patient of mine here for her Annual Physical and chronic disease management.     Patient is here  with her daughter-in-law.  Currently residing at an assisted living facility.      Chronic joint Pain.   On Tramadol as needed.   Received 1/2 her usual Rx at the beginning of the year. Due for refill.   Due to update Controlled Substance Agreement and complete UDS.   The [place] Prescription Monitoring Program has been reviewed and there are no concerns about diversionary activity for controlled substances at this time.  History of Alcohol abuse in  remission - has been sober for many years. Last drink was in the 1990s      DIABETES - Patient has a longstanding history of DiabetesType Type II . Patient is being treated with oral agents and denies significant side effects. Control has been good. Complicating factors include but are not limited to: hypertension, hyperlipidemia, and chronic kidney disease.   Recent A1c-   Component      Latest Ref Rng 11/20/2024  9:10 AM   Hemoglobin A1C      0.0 - 5.6 % 6.4 (H)       Onychomycosis of multiple toe nails- seeing a Podiatrist at the Assisted Living Facility.     HYPERLIPIDEMIA - Patient has a long history of significant Hyperlipidemia requiring medication for treatment with recent good control. Patient reports no problems or side effects with the medication.   Last lipid panel-  Recent Labs   Lab Test 04/17/24  1156 12/09/22  1519   CHOL 125 109   HDL 47* 45*   LDL 63 39   TRIG 77 127         HYPERTENSION - Patient has longstanding history of HTN , currently denies any symptoms referable to elevated blood pressure. Specifically denies chest pain, palpitations, dyspnea, orthopnea, PND or peripheral edema. Blood pressure readings have been in normal range. Current medication regimen is as listed below. Patient denies any side effects of medication.   BP Readings from Last 3 Encounters:   01/15/25 138/64   11/20/24 136/62   07/15/24 134/60         RENAL INSUFFICIENCY - Patient has a longstanding history of moderate-severe chronic renal insufficiency. Last Cr - .    Component      Latest Ref Rng 11/20/2024  8:44 AM   Creatinine      0.51 - 0.95 mg/dL 1.07 (H)    GFR Estimate      >60 mL/min/1.73m2 52 (L)         HEALTH CARE MAINTENANCE: Due for labs - patient is not fasting today. Vaccines to be obtained at the Pharmacy.     Health Care Directive  Patient has a Health Care Directive on file  Advance care planning document is on file but is outdated.  Patient encouraged to update.      1/15/2025   General Health   How would you rate your overall physical health? (!) FAIR   Feel stress (tense, anxious, or unable to sleep) Not at all         1/15/2025   Nutrition   Diet: Low salt    Diabetic       Multiple values from one day are sorted in reverse-chronological order         1/15/2025   Exercise   Days per week of moderate/strenous exercise 0 days   Average minutes spent exercising at this level 0 min   (!) EXERCISE CONCERN      1/15/2025   Social Factors   Frequency of gathering with friends or relatives More than three times a week   Worry food won't last until get money to buy more No   Food not last or not have enough money for food? No   Do you have housing? (Housing is defined as stable permanent housing and does not include staying ouside in a car, in a tent, in an abandoned building, in an overnight shelter, or couch-surfing.) Yes   Are you worried about losing your housing? No   Lack of transportation? No   Unable to get utilities (heat,electricity)? No         1/15/2025   Fall Risk   Fallen 2 or more times in the past year? No     No    Trouble with walking or balance? Yes     Yes        Proxy-reported    Multiple values from one day are sorted in reverse-chronological order         1/15/2025   Activities of Daily Living- Home Safety   Needs help with the following daily activites Transportation    Preparing meals    Housework    Medication administration   Safety concerns in the home None of the above       Multiple values from one day are sorted in  reverse-chronological order         1/15/2025   Dental   Dentist two times every year? (!) NO         1/15/2025   Hearing Screening   Hearing concerns? (!) I NEED TO ASK PEOPLE TO SPEAK UP OR REPEAT THEMSELVES.         1/15/2025   Driving Risk Screening   Patient/family members have concerns about driving (!) YES          1/15/2025   General Alertness/Fatigue Screening   Have you been more tired than usual lately? No         1/15/2025   Urinary Incontinence Screening   Bothered by leaking urine in past 6 months Yes         1/15/2025   TB Screening   Were you born outside of the [place]? No       Today's PHQ-9 Score:       1/15/2025    11:31 AM   PHQ-9 SCORE   PHQ-9 Total Score MyChart 3 (Minimal depression)   PHQ-9 Total Score 3        Patient-reported         1/15/2025   Substance Use   Alcohol more than 3/day or more than 7/wk No   Do you have a current opioid prescription? (!) YES   How severe/bad is pain from 1 to 10? 7/10   Do you use any other substances recreationally? No     Social History     Tobacco Use    Smoking status: Former     Current packs/day: 0.00     Types: Cigarettes     Start date: 1960     Quit date: 1990     Years since quittin.0     Passive exposure: Never    Smokeless tobacco: Never   Vaping Use    Vaping status: Never Used   Substance Use Topics    Alcohol use: No     Alcohol/week: 0.0 standard drinks of alcohol     Comment: history of abuse     Drug use: No       Mammogram Screening - After age 74- determine frequency with patient based on health status, life expectancy and patient goals        Reviewed and updated as needed this visit by Provider                    Past Medical History:   Diagnosis Date    ASHD (arteriosclerotic heart disease)     Carotid stenosis     mild    Diabetes mellitus     ETOHism (H)     h/o sober 97    Menopause 98    Onychomycoses     Ovarian cyst 2014    right    Overweight, obesity and other hyperalimentation     PAD (peripheral artery  disease)     Psoriasis     Rotator cuff tear, right 2/11/2012    Smoker     quit 88    Spasmodic torticollis     Unspecified essential hypertension     Vitamin D deficiencies      Past Surgical History:   Procedure Laterality Date    ANGIOGRAM  02    with 2 stents    ANGIOGRAM  04    ANGIOGRAM  12/2017, 2 stents in RCA    at [facility]     CLOSED RX CLAVICLE FRACTURE  08/07    CORONARY ARTERY BYPASS  01/14/09    TONSILLECTOMY & ADENOIDECTOMY      [facility] ANESTH,SURGERY OF SHOULDER  02/26/07    left shoulder arthroplasty    [facility] HAND/FINGER SURGERY UNLISTED      right thumb deep lac repair     Lab work is in process  Labs reviewed in EPIC  BP Readings from Last 3 Encounters:   01/15/25 138/64   11/20/24 136/62   07/15/24 134/60    Wt Readings from Last 3 Encounters:   01/15/25 75.3 kg (166 lb)   11/20/24 78.5 kg (173 lb 1.6 oz)   07/15/24 82.8 kg (182 lb 9.6 oz)                  Patient Active Problem List   Diagnosis    Onychomycosis due to dermatophyte    Psoriasis    PAD (peripheral artery disease)    Carotid stenosis    Spasmodic torticollis    CKD (chronic kidney disease) stage 3, GFR 30-59 ml/min (H)    Hyperthyroidism    Hyperlipidemia LDL goal <100    Peripheral neuropathy    Partial tear of rotator cuff    AC (acromioclavicular) joint arthritis - right    Shoulder impingement - right    Osteoarthritis of shoulder - right    Ovarian cyst    Chronic pain syndrome    Hypertension goal BP (blood pressure) < 140/90    Coronary artery disease involving autologous artery coronary bypass graft without angina pectoris    Type 2 diabetes mellitus with diabetic nephropathy, without long-term current use of insulin (H)    Gastroesophageal reflux disease without esophagitis    S/P coronary artery stent placement    Alcohol dependence in remission (H)    Secondary renal hyperparathyroidism    Continuous opioid dependence (H)     Past Surgical History:   Procedure Laterality Date    ANGIOGRAM  02    with 2 stents    ANGIOGRAM  04     ANGIOGRAM  2017, 2 stents in RCA    at [facility]     CLOSED RX CLAVICLE FRACTURE      CORONARY ARTERY BYPASS  09    TONSILLECTOMY & ADENOIDECTOMY      [email or web address] ANESTH,SURGERY OF SHOULDER  07    left shoulder arthroplasty    [facility] [[email or web address] HAND/FINGER SURGERY UNLISTED      right thumb deep lac repair       Social History     Tobacco Use    Smoking status: Former     Current packs/day: 0.00     Types: Cigarettes     Start date: 1960     Quit date: 1990     Years since quittin.0     Passive exposure: Never    Smokeless tobacco: Never   Substance Use Topics    Alcohol use: No     Alcohol/week: 0.0 standard drinks of alcohol     Comment: history of abuse      Family History   Problem Relation Age of Onset    Hypertension Mother     Cerebrovascular Disease Mother     Cardiovascular Father     C.A.D. Brother     Diabetes Brother     Hypertension Brother     Cardiovascular Brother          Current Outpatient Medications   Medication Sig Dispense Refill    aspirin 81 MG tablet Take 81 mg by mouth daily      B-12 OR 1000 MCG DAILY      blood glucose (NO BRAND SPECIFIED) lancets standard Use to test blood sugar 1-2 times daily or as directed. 100 each 3    blood glucose (NO BRAND SPECIFIED) lancets standard Use to test blood sugar 3 times daily or as directed. 100 each 3    blood glucose (ONETOUCH ULTRA) test strip USE TO TEST BLOOD SUGAR TWICE DAILY 100 strip 3    blood glucose monitoring (NO BRAND SPECIFIED) meter device kit Use to test blood sugar 2 times daily or as directed. 1 kit 0    brimonidine (ALPHAGAN-P) 0.15 % ophthalmic solution INSTILL 1 DROP IN LEFT EYE TWICE DAILY      ciclopirox (LOPROX) 0.77 % cream Apply topically 2 times daily To feet and toenails. 90 g 6    dorzolamide (TRUSOPT) 2 % ophthalmic solution       FISH OIL 1000 MG OR CAPS 2 TABLETS DAILY      FOLIC ACID OR 3000 MCG DAILY      glipiZIDE (GLUCOTROL XL) 5 MG 24 hr tablet Take 1 tablet (5 mg) by mouth daily. 90 tablet 3    latanoprost  (XALATAN) 0.005 % ophthalmic solution INSTILL 1 DROP IN LEFT EYE EVERY EVENING      metoprolol succinate ER (TOPROL XL) 50 MG 24 hr tablet TAKE 1 AND 1/2 TABLETS BY MOUTH EVERY  tablet 3    MULTI-VITAMIN OR TABS 1 TABLET DAILY      nitroGLYcerin (NITROSTAT) 0.4 MG sublingual tablet Place 1 tablet (0.4 mg) under the tongue every 5 minutes as needed for chest pain up to 3 tablets per episode. 60 tablet 1    omeprazole (PRILOSEC) 40 MG DR capsule TAKE 1 CAPSULE BY MOUTH EVERY OTHER DAY 90 capsule 3    rosuvastatin (CRESTOR) 40 MG tablet TAKE 1 TABLET(40 MG) BY MOUTH DAILY 90 tablet 1    RSV vaccine, bivalent, ABRYSVO, injection Inject 0.5 mLs into the muscle once for 1 dose. Pharmacist administered 0.5 mL 0    Tdap, tetanus-diptheria-acell pertussis, (BOOSTRIX) 5-2.5-18.5 LF-MCG/0.5 JYOTI injection Inject 0.5 mLs into the muscle once for 1 dose. 0.5 mL 0    timolol maleate (TIMOPTIC) 0.5 % ophthalmic solution       traMADol (ULTRAM) 50 MG tablet Take 2 tablets (100 mg) by mouth every 8 hours as needed for severe pain. MAX OF 6 TABLETS DAILY 90 tablet 0    VITAMIN D 1000 UNIT OR CAPS 1 CAPSULE DAILY       Allergies   Allergen Reactions    Dilantin [Phenytoin]      rash     Current providers sharing in care for this patient include:  Patient Care Team:  Margaret Chandra MD as PCP - General (Family Practice)  Margaret Chandra MD as Assigned PCP  Esther Johnson MD as MD (Nephrology)  Margaret Chandra MD as Assigned Pain Medication Provider  Abbe Miller DPM as Assigned Musculoskeletal Provider    The following health maintenance items are reviewed in Epic and correct as of today:  Health Maintenance   Topic Date Due    DEXA  Never done    HF ACTION PLAN  Never done    HEPATITIS A IMMUNIZATION (1 of 2 - Risk 2-dose series) Never done    RSV VACCINE (1 - 1-dose 75+ series) Never done    DTAP/TDAP/TD IMMUNIZATION (4 - Td or Tdap) 07/26/2021    CONTROLLED SUBSTANCE AGREEMENT FOR CHRONIC PAIN  "MANAGEMENT  12/07/2022    URINE DRUG SCREEN  05/19/2024    Medicare Annual MTM Pharmacist Visit (once per calendar year)  Never done    HEMOGLOBIN  01/15/2025    EYE EXAM  02/12/2025    A1C  02/20/2025    BMP  02/20/2025    MICROALBUMIN  02/20/2025    CBC  03/04/2025    LIPID  04/17/2025    TSH W/FREE T4 REFLEX  04/17/2025    DIABETIC FOOT EXAM  04/17/2025    ALT  11/20/2025    CMP  11/20/2025    MEDICARE ANNUAL WELLNESS VISIT  01/15/2026    HINA ASSESSMENT  01/15/2026    ANNUAL REVIEW OF HM ORDERS  01/15/2026    FALL RISK ASSESSMENT  01/15/2026    PHQ-9  01/15/2026    ADVANCE CARE PLANNING  05/01/2029    PARATHYROID  Completed    PHOSPHORUS  Completed    PHQ-2 (once per calendar year)  Completed    INFLUENZA VACCINE  Completed    Pneumococcal Vaccine: 50+ Years  Completed    ALK PHOS  Completed    ZOSTER IMMUNIZATION  Completed    COVID-19 Vaccine  Completed    HPV IMMUNIZATION  Aged Out    MENINGITIS IMMUNIZATION  Aged Out    RSV MONOCLONAL ANTIBODY  Aged Out    URINALYSIS  Discontinued    COLORECTAL CANCER SCREENING  Discontinued         Review of Systems  Constitutional, HEENT, cardiovascular, pulmonary, gi and gu systems are negative, except as otherwise noted.     Objective    Exam  /64   Pulse 94   Temp 98.3  F (36.8  C) (Temporal)   Resp 16   Ht 1.727 m (5' 8\")   Wt 75.3 kg (166 lb)   LMP  (LMP Unknown)   SpO2 94%   BMI 25.24 kg/m     Estimated body mass index is 25.24 kg/m  as calculated from the following:    Height as of this encounter: 1.727 m (5' 8\").    Weight as of this encounter: 75.3 kg (166 lb).    Physical Exam  GENERAL: alert and no distress.  Exam is limited as patient was examined in chair. Uses a front wheeled walker with a seat.   EYES: Eyes grossly normal to inspection, PERRL and conjunctivae and sclerae normal  HENT: ear canals and TM's normal, nose and mouth without ulcers or lesions  NECK: no adenopathy, no asymmetry, masses, or scars  RESP: lungs clear to auscultation - no " rales, rhonchi or wheezes  CV: regular rate and rhythm, normal S1 S2, no S3 or S4, no murmur, click or rub, no peripheral edema  MS: no gross musculoskeletal defects noted, no edema  SKIN: no suspicious lesions or rashes  NEURO: Normal strength and tone, mentation intact and speech normal  PSYCH: mentation appears normal, affect normal/bright        1/15/2025   Mini Cog   Mini-Cog Not Completed (choose reason) Patient declines   3 Item Recall 3 objects recalled       Not complete due to visual/hearing impairment- visual impairment.      DATA  Recent labs reviewed.   Labs drawn and in process         Signed Electronically by: Margaret Chandra MD    Answers submitted by the patient for this visit:  Patient Health Questionnaire (Submitted on 1/15/2025)  If you checked off any problems, how difficult have these problems made it for you to do your work, take care of things at home, or get along with other people?: Not difficult at all  PHQ9 TOTAL SCORE: 3  Patient Health Questionnaire (G7) (Submitted on 1/15/2025)  HINA 7 TOTAL SCORE: 0

## 2025-01-16 LAB
CHOLEST SERPL-MCNC: 108 MG/DL
FASTING STATUS PATIENT QL REPORTED: YES
HDLC SERPL-MCNC: 47 MG/DL
LDLC SERPL CALC-MCNC: 51 MG/DL
NONHDLC SERPL-MCNC: 61 MG/DL
TRIGL SERPL-MCNC: 50 MG/DL
TSH SERPL DL<=0.005 MIU/L-ACNC: 0.39 UIU/ML (ref 0.3–4.2)

## 2025-01-27 ENCOUNTER — TRANSFERRED RECORDS (OUTPATIENT)
Dept: HEALTH INFORMATION MANAGEMENT | Facility: CLINIC | Age: 80
End: 2025-01-27
Payer: COMMERCIAL

## 2025-01-29 ENCOUNTER — TELEPHONE (OUTPATIENT)
Dept: FAMILY MEDICINE | Facility: CLINIC | Age: 80
End: 2025-01-29
Payer: COMMERCIAL

## 2025-01-29 DIAGNOSIS — G89.4 CHRONIC PAIN SYNDROME: ICD-10-CM

## 2025-01-29 RX ORDER — TRAMADOL HYDROCHLORIDE 50 MG/1
50 TABLET ORAL EVERY 8 HOURS PRN
Qty: 180 TABLET | Refills: 0 | Status: SHIPPED | OUTPATIENT
Start: 2025-01-29

## 2025-01-29 NOTE — TELEPHONE ENCOUNTER
Received call from patient. She is calling to request a refill of her Tramadol. Patient explains that her last fill was a partial one, since PCP was out of clinic around the holidays. Per patient, she was advised to call at the end of the month to request refill.     Routing to PCP as HP, as patient is needing prescription as soon as possible prior to her son leaving out of town before the weekend. Patient's son picks up patient's medications.    SCARLET VillatoroN RN  [facility]

## 2025-02-12 ENCOUNTER — TELEPHONE (OUTPATIENT)
Dept: PHARMACY | Facility: OTHER | Age: 80
End: 2025-02-12
Payer: COMMERCIAL

## 2025-02-12 NOTE — TELEPHONE ENCOUNTER
[facility] Recruitment: [facility] insurance     Referral outreach attempt #1 on February 12, 2025      Outcome: left voicemail- Call back number [phone]    Rolanda Guadarrama [facility]  -[facility]  [phone]

## 2025-03-07 ENCOUNTER — TELEPHONE (OUTPATIENT)
Dept: FAMILY MEDICINE | Facility: CLINIC | Age: 80
End: 2025-03-07
Payer: COMMERCIAL

## 2025-03-07 DIAGNOSIS — G89.4 CHRONIC PAIN SYNDROME: ICD-10-CM

## 2025-03-07 NOTE — TELEPHONE ENCOUNTER
Amairani, Pharmacist from [facility] in [place] calling about pts Tramadol Rx. Pt had called pharmacist this morning and was informed.     Pt uses two tablets every 8 hours. The last two prescriptions that were sent over had the incorrect directions on them and pt is out of medication, but insurance is saying that they will not cover a refill until 3/21.     Amairani is hoping to help pt with any suggestion on getting pt a refill. RN stated that she will send to covering providers as PCP is out of office today.     Last visit on 1/15, notes from PCP about chronic pain from that visit:      Provider: Would we be able to send a Rx for 100 mg tablets of the tramadol to pharmacy to see if that is covered by insurance, with pt taking 50 mg every 2 hours?    Or do you have any other suggestions?    Carol Ann Street RN      Ins

## 2025-03-10 RX ORDER — TRAMADOL HYDROCHLORIDE 50 MG/1
50 TABLET ORAL EVERY 6 HOURS PRN
Qty: 180 TABLET | Refills: 0 | Status: SHIPPED | OUTPATIENT
Start: 2025-03-10

## 2025-03-10 NOTE — TELEPHONE ENCOUNTER
Patient calling again- let her know PCP has not seen message yet    She reports that her last narcotic scripts was written incorrectly.  Was supposed to be on 2 tabs every 8 hrs and not 1 every 6 hours.  She was taking them how she always takes them and didn't realize there was a change in her script.    Because of this, her insurance is not filling her prescription.      Jackelyn, RN    Triage Nurse  [facility]

## 2025-03-10 NOTE — CONFIDENTIAL NOTE
Noted.  Called patient.   Reviewed Tramadol dosing with patient- take 1 tab (50 mg) q 6 hrs prn. Avoid taking 100 mg at once. Patient verbalized understanding.   Rx sent.

## 2025-03-19 DIAGNOSIS — E78.5 HYPERLIPIDEMIA LDL GOAL <100: ICD-10-CM

## 2025-03-19 RX ORDER — ROSUVASTATIN CALCIUM 40 MG/1
40 TABLET, COATED ORAL DAILY
Qty: 90 TABLET | Refills: 0 | Status: SHIPPED | OUTPATIENT
Start: 2025-03-19

## 2025-04-21 DIAGNOSIS — G89.4 CHRONIC PAIN SYNDROME: ICD-10-CM

## 2025-04-23 RX ORDER — TRAMADOL HYDROCHLORIDE 50 MG/1
50 TABLET ORAL EVERY 6 HOURS PRN
Qty: 180 TABLET | Refills: 0 | Status: SHIPPED | OUTPATIENT
Start: 2025-04-23

## 2025-06-01 DIAGNOSIS — G89.4 CHRONIC PAIN SYNDROME: ICD-10-CM

## 2025-06-01 DIAGNOSIS — E78.5 HYPERLIPIDEMIA LDL GOAL <100: ICD-10-CM

## 2025-06-05 RX ORDER — ROSUVASTATIN CALCIUM 40 MG/1
40 TABLET, COATED ORAL DAILY
Qty: 90 TABLET | Refills: 0 | Status: SHIPPED | OUTPATIENT
Start: 2025-06-05

## 2025-06-05 RX ORDER — TRAMADOL HYDROCHLORIDE 50 MG/1
50 TABLET ORAL EVERY 6 HOURS PRN
Qty: 180 TABLET | Refills: 0 | Status: SHIPPED | OUTPATIENT
Start: 2025-06-05

## 2025-07-07 ENCOUNTER — TRANSCRIBE ORDERS (OUTPATIENT)
Dept: OTHER | Age: 80
End: 2025-07-07

## 2025-07-07 DIAGNOSIS — H91.93 PROBLEMS WITH HEARING, BILATERAL: Primary | ICD-10-CM

## 2025-07-14 DIAGNOSIS — G89.4 CHRONIC PAIN SYNDROME: ICD-10-CM

## 2025-07-14 RX ORDER — TRAMADOL HYDROCHLORIDE 50 MG/1
50 TABLET ORAL EVERY 6 HOURS PRN
Qty: 180 TABLET | Refills: 0 | Status: SHIPPED | OUTPATIENT
Start: 2025-07-14

## 2025-07-14 NOTE — TELEPHONE ENCOUNTER
Routing TE to PCP    Assisted in scheduling pt for appointment per comment from provider on 7/11 refill request encounter        Soonest available appointment scheduled with pt, she is unable to do virtual visits. 7/29/2025 4:00 PM (Arrive by 3:40 PM) with PCP.     Pt still needing refill for Tramadol will be out in the next couple of days.    Able to provide a partial refill to get pt to her appointment? Medication/pharmacy attached    Andree Deluca RN

## 2025-07-28 ENCOUNTER — TRANSFERRED RECORDS (OUTPATIENT)
Dept: HEALTH INFORMATION MANAGEMENT | Facility: CLINIC | Age: 80
End: 2025-07-28
Payer: COMMERCIAL

## 2025-07-28 LAB — RETINOPATHY: NORMAL

## 2025-07-29 ENCOUNTER — OFFICE VISIT (OUTPATIENT)
Dept: FAMILY MEDICINE | Facility: CLINIC | Age: 80
End: 2025-07-29
Payer: COMMERCIAL

## 2025-07-29 VITALS
OXYGEN SATURATION: 96 % | DIASTOLIC BLOOD PRESSURE: 68 MMHG | BODY MASS INDEX: 26.13 KG/M2 | SYSTOLIC BLOOD PRESSURE: 118 MMHG | HEART RATE: 92 BPM | HEIGHT: 68 IN | WEIGHT: 172.4 LBS | RESPIRATION RATE: 18 BRPM | TEMPERATURE: 98.3 F

## 2025-07-29 DIAGNOSIS — Z78.0 ASYMPTOMATIC POSTMENOPAUSAL STATUS: ICD-10-CM

## 2025-07-29 DIAGNOSIS — Z23 NEED FOR VACCINATION: ICD-10-CM

## 2025-07-29 DIAGNOSIS — E11.21 TYPE 2 DIABETES MELLITUS WITH DIABETIC NEPHROPATHY, WITHOUT LONG-TERM CURRENT USE OF INSULIN (H): Primary | ICD-10-CM

## 2025-07-29 DIAGNOSIS — N18.31 STAGE 3A CHRONIC KIDNEY DISEASE (H): ICD-10-CM

## 2025-07-29 LAB
ANION GAP SERPL CALCULATED.3IONS-SCNC: 9 MMOL/L (ref 7–15)
BUN SERPL-MCNC: 20.6 MG/DL (ref 8–23)
CALCIUM SERPL-MCNC: 9.6 MG/DL (ref 8.8–10.4)
CHLORIDE SERPL-SCNC: 104 MMOL/L (ref 98–107)
CREAT SERPL-MCNC: 1.15 MG/DL (ref 0.51–0.95)
EGFRCR SERPLBLD CKD-EPI 2021: 47 ML/MIN/1.73M2
ERYTHROCYTE [DISTWIDTH] IN BLOOD BY AUTOMATED COUNT: 14.1 % (ref 10–15)
EST. AVERAGE GLUCOSE BLD GHB EST-MCNC: 140 MG/DL
GLUCOSE SERPL-MCNC: 97 MG/DL (ref 70–99)
HBA1C MFR BLD: 6.5 % (ref 0–5.6)
HCO3 SERPL-SCNC: 28 MMOL/L (ref 22–29)
HCT VFR BLD AUTO: 41.3 % (ref 35–47)
HGB BLD-MCNC: 12.9 G/DL (ref 11.7–15.7)
MCH RBC QN AUTO: 29.8 PG (ref 26.5–33)
MCHC RBC AUTO-ENTMCNC: 31.2 G/DL (ref 31.5–36.5)
MCV RBC AUTO: 95 FL (ref 78–100)
PLATELET # BLD AUTO: 150 10E3/UL (ref 150–450)
POTASSIUM SERPL-SCNC: 4.7 MMOL/L (ref 3.4–5.3)
RBC # BLD AUTO: 4.33 10E6/UL (ref 3.8–5.2)
SODIUM SERPL-SCNC: 141 MMOL/L (ref 135–145)
WBC # BLD AUTO: 6 10E3/UL (ref 4–11)

## 2025-07-29 PROCEDURE — 36415 COLL VENOUS BLD VENIPUNCTURE: CPT | Performed by: FAMILY MEDICINE

## 2025-07-29 PROCEDURE — 3078F DIAST BP <80 MM HG: CPT | Performed by: FAMILY MEDICINE

## 2025-07-29 PROCEDURE — 99214 OFFICE O/P EST MOD 30 MIN: CPT | Performed by: FAMILY MEDICINE

## 2025-07-29 PROCEDURE — 80048 BASIC METABOLIC PNL TOTAL CA: CPT | Performed by: FAMILY MEDICINE

## 2025-07-29 PROCEDURE — G2211 COMPLEX E/M VISIT ADD ON: HCPCS | Performed by: FAMILY MEDICINE

## 2025-07-29 PROCEDURE — 85027 COMPLETE CBC AUTOMATED: CPT | Performed by: FAMILY MEDICINE

## 2025-07-29 PROCEDURE — 3074F SYST BP LT 130 MM HG: CPT | Performed by: FAMILY MEDICINE

## 2025-07-29 PROCEDURE — 83036 HEMOGLOBIN GLYCOSYLATED A1C: CPT | Performed by: FAMILY MEDICINE

## 2025-07-29 PROCEDURE — 3044F HG A1C LEVEL LT 7.0%: CPT | Performed by: FAMILY MEDICINE

## 2025-07-29 RX ORDER — ATROPINE SULFATE 10 MG/ML
1 SOLUTION/ DROPS OPHTHALMIC DAILY
COMMUNITY
Start: 2025-07-28

## 2025-07-29 NOTE — PROGRESS NOTES
"  Assessment & Plan     Zahida was seen today for recheck medication.    Diagnoses and all orders for this visit:    Type 2 diabetes mellitus with diabetic nephropathy, without long-term current use of insulin (H)  -     HEMOGLOBIN A1C  -     Decrease Glipizide if glycemic control continues to improve. Labs in process  -     Foot Exam- Declines. States that she recently had it completed at the Assisted Living Facility. Plans to get us records.     Stage 3a chronic kidney disease (H)  -     CBC with Platelets  -     BASIC METABOLIC PANEL    Asymptomatic postmenopausal status       -   Declines DEXA.      Need for vaccination  -     REVIEW OF HEALTH MAINTENANCE PROTOCOL ORDERS  -     Tdap, tetanus-diptheria-acell pertussis, (BOOSTRIX) 5-2.5-18.5 LF-MCG/0.5 JYOTI injection; Inject 0.5 mLs into the muscle once for 1 dose.  -     hepatitis A vaccine (VAQTA) 50 UNIT/ML injection; Inject 1 mL (50 Units) into the muscle once for 1 dose.      BMI  Estimated body mass index is 26.21 kg/m  as calculated from the following:    Height as of this encounter: 1.727 m (5' 8\").    Weight as of this encounter: 78.2 kg (172 lb 6.4 oz).       The longitudinal plan of care for the diagnosis(es)/condition(s) as documented were addressed during this visit. Due to the added complexity in care, I will continue to support Zahida in the subsequent management and with ongoing continuity of care.      Return in about 3 months (around 10/29/2025) for Diabetes Follow Up with a HgbA1C prior to visit.      Subjective   Zahida is a 80 year old, presenting for the following health issues:  Recheck Medication      7/29/2025     3:53 PM   Additional Questions   Roomed by Lakisha   Accompanied by daughter     History of Present Illness       Reason for visit:  Medication refills    She eats 2-3 servings of fruits and vegetables daily.She consumes 0 sweetened beverage(s) daily.She exercises with enough effort to increase her heart rate 9 or less minutes per " day.  She exercises with enough effort to increase her heart rate 3 or less days per week.   She is taking medications regularly.        Patient is here with her daughter in law. Currently residing in an Assisted Living Facility- feels safe there    DIABETES - Patient has a longstanding history of DiabetesType Type II . Patient is being treated with oral agents and denies significant side effects. Control has been good. Complicating factors include but are not limited to: hypertension, hyperlipidemia, and chronic kidney disease.   Due for an A1c today.   States that she had a Foot Exam completed recently.         RENAL INSUFFICIENCY - Patient has a longstanding history of moderate-severe chronic renal insufficiency.   Last Cr -.   Component      Latest Ref Rng 11/20/2024  8:44 AM   Creatinine      0.51 - 0.95 mg/dL 1.07 (H)    GFR Estimate      >60 mL/min/1.73m2 52 (L)         HEALTH CARE MAINTENANCE: Declines DEXA scanning.     Patient Active Problem List   Diagnosis    Onychomycosis due to dermatophyte    Psoriasis    PAD (peripheral artery disease)    Carotid stenosis    Spasmodic torticollis    CKD (chronic kidney disease) stage 3, GFR 30-59 ml/min (H)    Hyperthyroidism    Hyperlipidemia LDL goal <100    Peripheral neuropathy    Partial tear of rotator cuff    AC (acromioclavicular) joint arthritis - right    Shoulder impingement - right    Osteoarthritis of shoulder - right    Ovarian cyst    Chronic pain syndrome    Hypertension goal BP (blood pressure) < 140/90    Coronary artery disease involving autologous artery coronary bypass graft without angina pectoris    Type 2 diabetes mellitus with diabetic nephropathy, without long-term current use of insulin (H)    Gastroesophageal reflux disease without esophagitis    S/P coronary artery stent placement    Alcohol dependence in remission (H)    Secondary renal hyperparathyroidism    Continuous opioid dependence (H)     Past Surgical History:   Procedure  Laterality Date    ANGIOGRAM  02    with 2 stents    ANGIOGRAM  04    ANGIOGRAM  2017, 2 stents in RCA    at [facility]     CLOSED RX CLAVICLE FRACTURE      CORONARY ARTERY BYPASS  09    TONSILLECTOMY & ADENOIDECTOMY      [facility] ANESTH,SURGERY OF SHOULDER  07    left shoulder arthroplasty    [facility] HAND/FINGER SURGERY UNLISTED      right thumb deep lac repair       Social History     Tobacco Use    Smoking status: Former     Current packs/day: 0.00     Types: Cigarettes     Start date: 1960     Quit date: 1990     Years since quittin.6     Passive exposure: Never    Smokeless tobacco: Never   Substance Use Topics    Alcohol use: No     Alcohol/week: 0.0 standard drinks of alcohol     Comment: history of abuse      Family History   Problem Relation Age of Onset    Hypertension Mother     Cerebrovascular Disease Mother     Cardiovascular Father     C.A.D. Brother     Diabetes Brother     Hypertension Brother     Cardiovascular Brother          Current Outpatient Medications   Medication Sig Dispense Refill    aspirin 81 MG tablet Take 81 mg by mouth daily      atropine 1 % ophthalmic solution Place 1 drop into both eyes daily.      B-12 OR 1000 MCG DAILY      blood glucose (NO BRAND SPECIFIED) lancets standard Use to test blood sugar 1-2 times daily or as directed. 100 each 3    blood glucose (NO BRAND SPECIFIED) lancets standard Use to test blood sugar 3 times daily or as directed. 100 each 3    blood glucose (ONETOUCH ULTRA) test strip USE TO TEST BLOOD SUGAR TWICE DAILY 100 strip 3    blood glucose monitoring (NO BRAND SPECIFIED) meter device kit Use to test blood sugar 2 times daily or as directed. 1 kit 0    brimonidine (ALPHAGAN-P) 0.15 % ophthalmic solution INSTILL 1 DROP IN LEFT EYE TWICE DAILY      ciclopirox (LOPROX) 0.77 % cream Apply topically 2 times daily To feet and toenails. 90 g 6    dorzolamide (TRUSOPT) 2 % ophthalmic solution       FISH OIL 1000 MG OR CAPS 2 TABLETS DAILY       FOLIC ACID OR 3000 MCG DAILY      glipiZIDE (GLUCOTROL XL) 5 MG 24 hr tablet Take 1 tablet (5 mg) by mouth daily. 90 tablet 3    latanoprost (XALATAN) 0.005 % ophthalmic solution INSTILL 1 DROP IN LEFT EYE EVERY EVENING      metoprolol succinate ER (TOPROL XL) 50 MG 24 hr tablet TAKE 1 AND 1/2 TABLETS BY MOUTH EVERY  tablet 3    MULTI-VITAMIN OR TABS 1 TABLET DAILY      nitroGLYcerin (NITROSTAT) 0.4 MG sublingual tablet Place 1 tablet (0.4 mg) under the tongue every 5 minutes as needed for chest pain up to 3 tablets per episode. 60 tablet 1    omeprazole (PRILOSEC) 40 MG DR capsule TAKE 1 CAPSULE BY MOUTH EVERY OTHER DAY 90 capsule 3    rosuvastatin (CRESTOR) 40 MG tablet TAKE 1 TABLET(40 MG) BY MOUTH DAILY 90 tablet 0    timolol maleate (TIMOPTIC) 0.5 % ophthalmic solution       traMADol (ULTRAM) 50 MG tablet Take 1 tablet (50 mg) by mouth every 6 hours as needed for severe pain. 180 tablet 0    VITAMIN D 1000 UNIT OR CAPS 1 CAPSULE DAILY       Allergies   Allergen Reactions    Dilantin [Phenytoin]      rash     Recent Labs   Lab Test 07/29/25  1627 01/15/25  1253 11/20/24  0910 11/20/24  0844 07/15/24  0956 04/17/24  1156 05/19/23  1413 12/09/22  1519 12/03/21  1045 11/13/20  0845 07/22/20  1056   A1C 6.5*  --  6.4*  --  7.2* 7.4*   < > 8.2*   < > 7.4* 7.4*   LDL  --  51  --   --   --  63  --  39   < >  --  56   HDL  --  47*  --   --   --  47*  --  45*   < >  --  43*   TRIG  --  50  --   --   --  77  --  127   < >  --  137   ALT  --   --   --  23  --  16  --  34   < >  --  27   CR 1.15*  --   --  1.07* 1.09* 1.18*   < > 1.76*   < > 1.51* 1.75*   GFRESTIMATED 47*  --   --  52* 51* 46*   < > 29*   < > 33* 28*   GFRESTBLACK  --   --   --   --   --   --   --   --   --  38* 32*   POTASSIUM 4.7  --   --  4.3 4.7 4.8   < > 4.2   < >  --  4.1   TSH  --  0.39  --   --   --  0.74  --  0.81   < >  --  0.51    < > = values in this interval not displayed.      BP Readings from Last 3 Encounters:   07/29/25 118/68  "  01/15/25 138/64   11/20/24 136/62    Wt Readings from Last 3 Encounters:   07/29/25 78.2 kg (172 lb 6.4 oz)   01/15/25 75.3 kg (166 lb)   11/20/24 78.5 kg (173 lb 1.6 oz)                      Review of Systems  Constitutional, HEENT, cardiovascular, pulmonary, gi and gu systems are negative, except as otherwise noted.      Objective    /68   Pulse 92   Temp 98.3  F (36.8  C) (Temporal)   Resp 18   Ht 1.727 m (5' 8\")   Wt 78.2 kg (172 lb 6.4 oz)   LMP  (LMP Unknown)   SpO2 96%   BMI 26.21 kg/m    Body mass index is 26.21 kg/m .  Physical Exam   GENERAL: alert and no distress  EYES: Eyes grossly normal to inspection.  No discharge or erythema, or obvious scleral/conjunctival abnormalities.  RESP: No audible wheeze, cough, or visible cyanosis.    SKIN: Visible skin clear. No significant rash, abnormal pigmentation or lesions.  NEURO: Cranial nerves grossly intact.  Mentation and speech appropriate for age.  PSYCH: Appropriate affect, tone, and pace of words  GAIT: uses a front wheeled walker with a seat for ambulation..  FOOT EXAM: Declines- states that she recently had it completed at the Assisted Living Facility.       DATA  Labs drawn and in process        Signed Electronically by: Margaret Chandra MD    "

## 2025-08-25 DIAGNOSIS — E78.5 HYPERLIPIDEMIA LDL GOAL <100: ICD-10-CM

## 2025-08-25 DIAGNOSIS — G89.4 CHRONIC PAIN SYNDROME: ICD-10-CM

## 2025-08-25 RX ORDER — ROSUVASTATIN CALCIUM 40 MG/1
40 TABLET, COATED ORAL DAILY
Qty: 90 TABLET | Refills: 0 | Status: SHIPPED | OUTPATIENT
Start: 2025-08-25

## 2025-08-26 RX ORDER — TRAMADOL HYDROCHLORIDE 50 MG/1
50 TABLET ORAL EVERY 6 HOURS PRN
Qty: 180 TABLET | Refills: 0 | Status: SHIPPED | OUTPATIENT
Start: 2025-08-26